# Patient Record
Sex: FEMALE | Race: OTHER | Employment: UNEMPLOYED | URBAN - METROPOLITAN AREA
[De-identification: names, ages, dates, MRNs, and addresses within clinical notes are randomized per-mention and may not be internally consistent; named-entity substitution may affect disease eponyms.]

---

## 2022-01-05 ENCOUNTER — APPOINTMENT (OUTPATIENT)
Dept: CT IMAGING | Age: 88
DRG: 683 | End: 2022-01-05
Payer: MEDICARE

## 2022-01-05 ENCOUNTER — APPOINTMENT (OUTPATIENT)
Dept: GENERAL RADIOLOGY | Age: 88
DRG: 683 | End: 2022-01-05
Payer: MEDICARE

## 2022-01-05 ENCOUNTER — HOSPITAL ENCOUNTER (INPATIENT)
Age: 88
LOS: 1 days | Discharge: HOME HEALTH | DRG: 683 | End: 2022-01-07
Attending: EMERGENCY MEDICINE | Admitting: INTERNAL MEDICINE
Payer: MEDICARE

## 2022-01-05 DIAGNOSIS — N39.0 URINARY TRACT INFECTION WITHOUT HEMATURIA, SITE UNSPECIFIED: ICD-10-CM

## 2022-01-05 DIAGNOSIS — S09.90XA INJURY OF HEAD, INITIAL ENCOUNTER: Primary | ICD-10-CM

## 2022-01-05 DIAGNOSIS — N17.9 AKI (ACUTE KIDNEY INJURY) (HCC): ICD-10-CM

## 2022-01-05 LAB
-: ABNORMAL
ABSOLUTE EOS #: 0.14 K/UL (ref 0–0.44)
ABSOLUTE IMMATURE GRANULOCYTE: <0.03 K/UL (ref 0–0.3)
ABSOLUTE LYMPH #: 1.22 K/UL (ref 1.1–3.7)
ABSOLUTE MONO #: 0.85 K/UL (ref 0.1–1.2)
ALBUMIN SERPL-MCNC: 3.6 G/DL (ref 3.5–5.2)
ALBUMIN/GLOBULIN RATIO: 1.5 (ref 1–2.5)
ALP BLD-CCNC: 88 U/L (ref 35–104)
ALT SERPL-CCNC: 14 U/L (ref 5–33)
AMORPHOUS: ABNORMAL
ANION GAP SERPL CALCULATED.3IONS-SCNC: 12 MMOL/L (ref 9–17)
AST SERPL-CCNC: 13 U/L
BACTERIA: ABNORMAL
BASOPHILS # BLD: 1 % (ref 0–2)
BASOPHILS ABSOLUTE: 0.05 K/UL (ref 0–0.2)
BILIRUB SERPL-MCNC: 0.55 MG/DL (ref 0.3–1.2)
BILIRUBIN URINE: NEGATIVE
BUN BLDV-MCNC: 29 MG/DL (ref 8–23)
BUN/CREAT BLD: ABNORMAL (ref 9–20)
CALCIUM SERPL-MCNC: 9.3 MG/DL (ref 8.6–10.4)
CASTS UA: ABNORMAL /LPF (ref 0–8)
CHLORIDE BLD-SCNC: 101 MMOL/L (ref 98–107)
CO2: 25 MMOL/L (ref 20–31)
COLOR: YELLOW
COMMENT UA: ABNORMAL
CREAT SERPL-MCNC: 1.36 MG/DL (ref 0.5–0.9)
CRYSTALS, UA: ABNORMAL /HPF
DIFFERENTIAL TYPE: ABNORMAL
EOSINOPHILS RELATIVE PERCENT: 2 % (ref 1–4)
EPITHELIAL CELLS UA: ABNORMAL /HPF (ref 0–5)
GFR AFRICAN AMERICAN: 44 ML/MIN
GFR NON-AFRICAN AMERICAN: 37 ML/MIN
GFR SERPL CREATININE-BSD FRML MDRD: ABNORMAL ML/MIN/{1.73_M2}
GFR SERPL CREATININE-BSD FRML MDRD: ABNORMAL ML/MIN/{1.73_M2}
GLUCOSE BLD-MCNC: 116 MG/DL (ref 70–99)
GLUCOSE URINE: NEGATIVE
HCT VFR BLD CALC: 37.9 % (ref 36.3–47.1)
HEMOGLOBIN: 12.5 G/DL (ref 11.9–15.1)
IMMATURE GRANULOCYTES: 0 %
KETONES, URINE: NEGATIVE
LEUKOCYTE ESTERASE, URINE: ABNORMAL
LYMPHOCYTES # BLD: 14 % (ref 24–43)
MCH RBC QN AUTO: 30.3 PG (ref 25.2–33.5)
MCHC RBC AUTO-ENTMCNC: 33 G/DL (ref 28.4–34.8)
MCV RBC AUTO: 92 FL (ref 82.6–102.9)
MONOCYTES # BLD: 10 % (ref 3–12)
MUCUS: ABNORMAL
NITRITE, URINE: POSITIVE
NRBC AUTOMATED: 0 PER 100 WBC
OTHER OBSERVATIONS UA: ABNORMAL
PDW BLD-RTO: 12.6 % (ref 11.8–14.4)
PH UA: 5.5 (ref 5–8)
PLATELET # BLD: 183 K/UL (ref 138–453)
PLATELET ESTIMATE: ABNORMAL
PMV BLD AUTO: 8.9 FL (ref 8.1–13.5)
POTASSIUM SERPL-SCNC: 3.9 MMOL/L (ref 3.7–5.3)
PROTEIN UA: NEGATIVE
RBC # BLD: 4.12 M/UL (ref 3.95–5.11)
RBC # BLD: ABNORMAL 10*6/UL
RBC UA: ABNORMAL /HPF (ref 0–4)
RENAL EPITHELIAL, UA: ABNORMAL /HPF
SEG NEUTROPHILS: 73 % (ref 36–65)
SEGMENTED NEUTROPHILS ABSOLUTE COUNT: 6.54 K/UL (ref 1.5–8.1)
SODIUM BLD-SCNC: 138 MMOL/L (ref 135–144)
SPECIFIC GRAVITY UA: 1.01 (ref 1–1.03)
TOTAL PROTEIN: 6 G/DL (ref 6.4–8.3)
TRICHOMONAS: ABNORMAL
TROPONIN INTERP: NORMAL
TROPONIN T: NORMAL NG/ML
TROPONIN, HIGH SENSITIVITY: 14 NG/L (ref 0–14)
TURBIDITY: CLEAR
URINE HGB: NEGATIVE
UROBILINOGEN, URINE: NORMAL
WBC # BLD: 8.8 K/UL (ref 3.5–11.3)
WBC # BLD: ABNORMAL 10*3/UL
WBC UA: ABNORMAL /HPF (ref 0–5)
YEAST: ABNORMAL

## 2022-01-05 PROCEDURE — 70450 CT HEAD/BRAIN W/O DYE: CPT

## 2022-01-05 PROCEDURE — 73502 X-RAY EXAM HIP UNI 2-3 VIEWS: CPT

## 2022-01-05 PROCEDURE — 99284 EMERGENCY DEPT VISIT MOD MDM: CPT

## 2022-01-05 PROCEDURE — 84484 ASSAY OF TROPONIN QUANT: CPT

## 2022-01-05 PROCEDURE — 93005 ELECTROCARDIOGRAM TRACING: CPT | Performed by: STUDENT IN AN ORGANIZED HEALTH CARE EDUCATION/TRAINING PROGRAM

## 2022-01-05 PROCEDURE — 73700 CT LOWER EXTREMITY W/O DYE: CPT

## 2022-01-05 PROCEDURE — 81001 URINALYSIS AUTO W/SCOPE: CPT

## 2022-01-05 PROCEDURE — 71045 X-RAY EXAM CHEST 1 VIEW: CPT

## 2022-01-05 PROCEDURE — 72125 CT NECK SPINE W/O DYE: CPT

## 2022-01-05 PROCEDURE — 85025 COMPLETE CBC W/AUTO DIFF WBC: CPT

## 2022-01-05 PROCEDURE — 2580000003 HC RX 258: Performed by: STUDENT IN AN ORGANIZED HEALTH CARE EDUCATION/TRAINING PROGRAM

## 2022-01-05 PROCEDURE — 87086 URINE CULTURE/COLONY COUNT: CPT

## 2022-01-05 PROCEDURE — 2580000003 HC RX 258: Performed by: EMERGENCY MEDICINE

## 2022-01-05 PROCEDURE — 80053 COMPREHEN METABOLIC PANEL: CPT

## 2022-01-05 PROCEDURE — 87186 SC STD MICRODIL/AGAR DIL: CPT

## 2022-01-05 PROCEDURE — 87077 CULTURE AEROBIC IDENTIFY: CPT

## 2022-01-05 RX ORDER — SODIUM CHLORIDE, SODIUM LACTATE, POTASSIUM CHLORIDE, AND CALCIUM CHLORIDE .6; .31; .03; .02 G/100ML; G/100ML; G/100ML; G/100ML
1000 INJECTION, SOLUTION INTRAVENOUS ONCE
Status: COMPLETED | OUTPATIENT
Start: 2022-01-05 | End: 2022-01-06

## 2022-01-05 RX ORDER — 0.9 % SODIUM CHLORIDE 0.9 %
500 INTRAVENOUS SOLUTION INTRAVENOUS ONCE
Status: COMPLETED | OUTPATIENT
Start: 2022-01-05 | End: 2022-01-06

## 2022-01-05 RX ADMIN — SODIUM CHLORIDE 500 ML: 9 INJECTION, SOLUTION INTRAVENOUS at 22:21

## 2022-01-05 RX ADMIN — SODIUM CHLORIDE, POTASSIUM CHLORIDE, SODIUM LACTATE AND CALCIUM CHLORIDE 1000 ML: 600; 310; 30; 20 INJECTION, SOLUTION INTRAVENOUS at 23:30

## 2022-01-05 ASSESSMENT — ENCOUNTER SYMPTOMS
VOMITING: 0
DIARRHEA: 0
NAUSEA: 0
BACK PAIN: 0
ABDOMINAL PAIN: 0
RHINORRHEA: 0
SHORTNESS OF BREATH: 0
COUGH: 0
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
## 2022-01-06 PROBLEM — N39.0 UTI (URINARY TRACT INFECTION): Status: ACTIVE | Noted: 2022-01-06

## 2022-01-06 PROBLEM — S01.91XA LACERATION OF HEAD: Status: ACTIVE | Noted: 2022-01-06

## 2022-01-06 PROBLEM — F03.90 DEMENTIA (HCC): Status: ACTIVE | Noted: 2022-01-06

## 2022-01-06 PROBLEM — I11.0 HYPERTENSIVE HEART DISEASE WITH CHRONIC COMBINED SYSTOLIC AND DIASTOLIC CONGESTIVE HEART FAILURE (HCC): Status: ACTIVE | Noted: 2022-01-06

## 2022-01-06 PROBLEM — N17.9 AKI (ACUTE KIDNEY INJURY) (HCC): Status: ACTIVE | Noted: 2022-01-06

## 2022-01-06 PROBLEM — I50.42 HYPERTENSIVE HEART DISEASE WITH CHRONIC COMBINED SYSTOLIC AND DIASTOLIC CONGESTIVE HEART FAILURE (HCC): Status: ACTIVE | Noted: 2022-01-06

## 2022-01-06 LAB
LITHIUM DATE LAST DOSE: ABNORMAL
LITHIUM DOSE AMOUNT: ABNORMAL
LITHIUM DOSE TIME: ABNORMAL
LITHIUM LEVEL: <0.1 MMOL/L (ref 0.6–1.2)
LV EF: 65 %
LVEF MODALITY: NORMAL

## 2022-01-06 PROCEDURE — 6360000002 HC RX W HCPCS

## 2022-01-06 PROCEDURE — 2580000003 HC RX 258

## 2022-01-06 PROCEDURE — 93356 MYOCRD STRAIN IMG SPCKL TRCK: CPT

## 2022-01-06 PROCEDURE — 99233 SBSQ HOSP IP/OBS HIGH 50: CPT | Performed by: INTERNAL MEDICINE

## 2022-01-06 PROCEDURE — 6370000000 HC RX 637 (ALT 250 FOR IP)

## 2022-01-06 PROCEDURE — 97162 PT EVAL MOD COMPLEX 30 MIN: CPT

## 2022-01-06 PROCEDURE — 97166 OT EVAL MOD COMPLEX 45 MIN: CPT

## 2022-01-06 PROCEDURE — 97535 SELF CARE MNGMENT TRAINING: CPT

## 2022-01-06 PROCEDURE — 93005 ELECTROCARDIOGRAM TRACING: CPT | Performed by: INTERNAL MEDICINE

## 2022-01-06 PROCEDURE — 80178 ASSAY OF LITHIUM: CPT

## 2022-01-06 PROCEDURE — 36415 COLL VENOUS BLD VENIPUNCTURE: CPT

## 2022-01-06 PROCEDURE — 93306 TTE W/DOPPLER COMPLETE: CPT

## 2022-01-06 PROCEDURE — 1200000000 HC SEMI PRIVATE

## 2022-01-06 PROCEDURE — 97116 GAIT TRAINING THERAPY: CPT

## 2022-01-06 PROCEDURE — 6370000000 HC RX 637 (ALT 250 FOR IP): Performed by: INTERNAL MEDICINE

## 2022-01-06 RX ORDER — ACETAMINOPHEN 650 MG/1
650 SUPPOSITORY RECTAL EVERY 6 HOURS PRN
Status: DISCONTINUED | OUTPATIENT
Start: 2022-01-06 | End: 2022-01-07 | Stop reason: HOSPADM

## 2022-01-06 RX ORDER — ONDANSETRON 2 MG/ML
4 INJECTION INTRAMUSCULAR; INTRAVENOUS EVERY 6 HOURS PRN
Status: DISCONTINUED | OUTPATIENT
Start: 2022-01-06 | End: 2022-01-07 | Stop reason: HOSPADM

## 2022-01-06 RX ORDER — NITROGLYCERIN 0.4 MG/1
0.4 TABLET SUBLINGUAL EVERY 5 MIN PRN
Status: DISCONTINUED | OUTPATIENT
Start: 2022-01-06 | End: 2022-01-07 | Stop reason: HOSPADM

## 2022-01-06 RX ORDER — OLANZAPINE AND FLUOXETINE 6; 25 MG/1; MG/1
1 CAPSULE ORAL NIGHTLY
Status: DISCONTINUED | OUTPATIENT
Start: 2022-01-06 | End: 2022-01-07 | Stop reason: HOSPADM

## 2022-01-06 RX ORDER — DONEPEZIL HYDROCHLORIDE 10 MG/1
10 TABLET, FILM COATED ORAL NIGHTLY
Status: DISCONTINUED | OUTPATIENT
Start: 2022-01-06 | End: 2022-01-07 | Stop reason: HOSPADM

## 2022-01-06 RX ORDER — POTASSIUM CHLORIDE 20 MEQ/1
40 TABLET, EXTENDED RELEASE ORAL PRN
Status: DISCONTINUED | OUTPATIENT
Start: 2022-01-06 | End: 2022-01-07 | Stop reason: HOSPADM

## 2022-01-06 RX ORDER — POLYETHYLENE GLYCOL 3350 17 G/17G
17 POWDER, FOR SOLUTION ORAL DAILY PRN
Status: DISCONTINUED | OUTPATIENT
Start: 2022-01-06 | End: 2022-01-07 | Stop reason: HOSPADM

## 2022-01-06 RX ORDER — SODIUM CHLORIDE 9 MG/ML
INJECTION, SOLUTION INTRAVENOUS CONTINUOUS
Status: DISCONTINUED | OUTPATIENT
Start: 2022-01-06 | End: 2022-01-07 | Stop reason: HOSPADM

## 2022-01-06 RX ORDER — LEVOTHYROXINE SODIUM 0.05 MG/1
50 TABLET ORAL DAILY
Status: DISCONTINUED | OUTPATIENT
Start: 2022-01-06 | End: 2022-01-07 | Stop reason: HOSPADM

## 2022-01-06 RX ORDER — SODIUM CHLORIDE 9 MG/ML
25 INJECTION, SOLUTION INTRAVENOUS PRN
Status: DISCONTINUED | OUTPATIENT
Start: 2022-01-06 | End: 2022-01-07 | Stop reason: HOSPADM

## 2022-01-06 RX ORDER — PREGABALIN 25 MG/1
25 CAPSULE ORAL 3 TIMES DAILY
Status: DISCONTINUED | OUTPATIENT
Start: 2022-01-06 | End: 2022-01-07 | Stop reason: HOSPADM

## 2022-01-06 RX ORDER — ACETAMINOPHEN 325 MG/1
650 TABLET ORAL EVERY 6 HOURS PRN
Status: DISCONTINUED | OUTPATIENT
Start: 2022-01-06 | End: 2022-01-07 | Stop reason: HOSPADM

## 2022-01-06 RX ORDER — FLUOXETINE HYDROCHLORIDE 20 MG/1
20 CAPSULE ORAL DAILY
Status: DISCONTINUED | OUTPATIENT
Start: 2022-01-06 | End: 2022-01-07 | Stop reason: HOSPADM

## 2022-01-06 RX ORDER — POTASSIUM CHLORIDE 7.45 MG/ML
10 INJECTION INTRAVENOUS PRN
Status: DISCONTINUED | OUTPATIENT
Start: 2022-01-06 | End: 2022-01-07 | Stop reason: HOSPADM

## 2022-01-06 RX ORDER — SODIUM CHLORIDE 0.9 % (FLUSH) 0.9 %
5-40 SYRINGE (ML) INJECTION EVERY 12 HOURS SCHEDULED
Status: DISCONTINUED | OUTPATIENT
Start: 2022-01-06 | End: 2022-01-07 | Stop reason: HOSPADM

## 2022-01-06 RX ORDER — ONDANSETRON 4 MG/1
4 TABLET, ORALLY DISINTEGRATING ORAL EVERY 8 HOURS PRN
Status: DISCONTINUED | OUTPATIENT
Start: 2022-01-06 | End: 2022-01-07 | Stop reason: HOSPADM

## 2022-01-06 RX ORDER — HEPARIN SODIUM 5000 [USP'U]/ML
5000 INJECTION, SOLUTION INTRAVENOUS; SUBCUTANEOUS EVERY 8 HOURS SCHEDULED
Status: DISCONTINUED | OUTPATIENT
Start: 2022-01-06 | End: 2022-01-07 | Stop reason: HOSPADM

## 2022-01-06 RX ORDER — GABAPENTIN 100 MG/1
100 CAPSULE ORAL 2 TIMES DAILY
Status: DISCONTINUED | OUTPATIENT
Start: 2022-01-06 | End: 2022-01-07 | Stop reason: HOSPADM

## 2022-01-06 RX ORDER — SODIUM CHLORIDE 0.9 % (FLUSH) 0.9 %
5-40 SYRINGE (ML) INJECTION PRN
Status: DISCONTINUED | OUTPATIENT
Start: 2022-01-06 | End: 2022-01-07 | Stop reason: HOSPADM

## 2022-01-06 RX ORDER — FAMOTIDINE 20 MG/1
20 TABLET, FILM COATED ORAL 2 TIMES DAILY
Status: DISCONTINUED | OUTPATIENT
Start: 2022-01-06 | End: 2022-01-07 | Stop reason: HOSPADM

## 2022-01-06 RX ORDER — MAGNESIUM SULFATE IN WATER 40 MG/ML
2000 INJECTION, SOLUTION INTRAVENOUS PRN
Status: DISCONTINUED | OUTPATIENT
Start: 2022-01-06 | End: 2022-01-07 | Stop reason: HOSPADM

## 2022-01-06 RX ADMIN — PREGABALIN 25 MG: 25 CAPSULE ORAL at 22:27

## 2022-01-06 RX ADMIN — HEPARIN SODIUM 5000 UNITS: 5000 INJECTION INTRAVENOUS; SUBCUTANEOUS at 07:34

## 2022-01-06 RX ADMIN — DONEPEZIL HYDROCHLORIDE 10 MG: 10 TABLET, FILM COATED ORAL at 22:24

## 2022-01-06 RX ADMIN — FLUOXETINE HYDROCHLORIDE 20 MG: 20 CAPSULE ORAL at 08:21

## 2022-01-06 RX ADMIN — GABAPENTIN 100 MG: 100 CAPSULE ORAL at 08:21

## 2022-01-06 RX ADMIN — PREGABALIN 25 MG: 25 CAPSULE ORAL at 08:22

## 2022-01-06 RX ADMIN — FAMOTIDINE 20 MG: 20 TABLET, FILM COATED ORAL at 22:25

## 2022-01-06 RX ADMIN — HEPARIN SODIUM 5000 UNITS: 5000 INJECTION INTRAVENOUS; SUBCUTANEOUS at 16:05

## 2022-01-06 RX ADMIN — GABAPENTIN 100 MG: 100 CAPSULE ORAL at 22:25

## 2022-01-06 RX ADMIN — FAMOTIDINE 20 MG: 20 TABLET, FILM COATED ORAL at 13:26

## 2022-01-06 RX ADMIN — CEFTRIAXONE SODIUM 1000 MG: 1 INJECTION, POWDER, FOR SOLUTION INTRAMUSCULAR; INTRAVENOUS at 02:42

## 2022-01-06 RX ADMIN — SODIUM CHLORIDE: 9 INJECTION, SOLUTION INTRAVENOUS at 02:53

## 2022-01-06 RX ADMIN — LEVOTHYROXINE SODIUM 50 MCG: 50 TABLET ORAL at 08:21

## 2022-01-06 ASSESSMENT — PAIN DESCRIPTION - DESCRIPTORS: DESCRIPTORS: ACHING

## 2022-01-06 ASSESSMENT — PAIN SCALES - GENERAL
PAINLEVEL_OUTOF10: 0
PAINLEVEL_OUTOF10: 5
PAINLEVEL_OUTOF10: 0
PAINLEVEL_OUTOF10: 5

## 2022-01-06 ASSESSMENT — PAIN DESCRIPTION - PAIN TYPE: TYPE: ACUTE PAIN

## 2022-01-06 ASSESSMENT — PAIN DESCRIPTION - FREQUENCY: FREQUENCY: CONTINUOUS

## 2022-01-06 ASSESSMENT — PAIN DESCRIPTION - LOCATION: LOCATION: ABDOMEN

## 2022-01-06 NOTE — H&P
[address]     Department of Internal Medicine - Staff Internal Medicine Teaching Service          ADMISSION NOTE/HISTORY AND PHYSICAL EXAMINATION   Date: 1/5/2022  Patient Name: Jules Jones  Date of admission: 1/5/2022  8:12 PM  YOB: 1930  PCP: Marshall Edmonds MD  History Obtained From: Son, electronic medical record    CHIEF COMPLAINT     Chief complaint: Fall    HISTORY OF PRESENTING ILLNESS     The patient is a pleasant 81 y.o. female with PMH of dementia, depression, anxiety, hypothyroidism presents with a chief complaint of a nonmechanical fall 3 days ago. History obtained from son. Patient does not understand [place]. She fell back, hit her head and had laceration of occipital scalp. Not evaluated at that time, unclear about loss of consciousness, no seizures like activity. Report intermittent headaches and pelvic pain. Family is concerned about facial droop, altered mental status, decreased responsiveness and unsteady gait. Dr. David Hendrix is /. denies any nausea, vomiting abdominal pain, chest, shortness of breath, urgency, diarrhea. On arrival to ED patient was AAO x4, GCS 15. EOMI. CN II to XII intact. No focal motor or sensory neurologic deficit. Examination of the scalp revealed healing laceration of the right occipital region. No crepitus or discharge noted. Tenderness to palpation over the hypogastric area. No distention on abdomin noted. On my evaluation patient is hemodynamically stable, no focal neurologic deficit. Unable to understand [place].  not present at bedside. Examination of the scalp shows about 2 cm linear healing laceration. No active chest sounds. Abdomen is soft, nontender. Bilateral pedal edema with tenderness noted. Pertinent labs : sodium 138, potassium 3.9, BUN 29, creatinine 1.36, , troponin XIV, WBC 8.8, Hb 12.5, platelet 872.   UA shows positive for nitrate and trace leukocyte esterase, WBC 10-20 with many bacteria. Urine culture sent. CXR mild pulmonary vascular congestion, no overt pulmonary edema. Review of Systems   Unable to obtain due to communication issues. PAST MEDICAL HISTORY     Past Medical History:   Diagnosis Date    Anxiety     Depression     Hypertensive emergency 11/3/2015    Thyroid disease        PAST SURGICAL HISTORY     No past surgical history on file. ALLERGIES     Valium [diazepam]    MEDICATIONS PRIOR TO ADMISSION     Prior to Admission medications    Medication Sig Start Date End Date Taking?  Authorizing Provider   fesoterodine 4 MG TB24 Take 1 tablet by mouth daily    Historical Provider, MD   Brexpiprazole 0.5 MG TABS Take 1 tablet by mouth daily    Historical Provider, MD   ranitidine (ZANTAC) 150 MG tablet Take 150 mg by mouth 2 times daily    Historical Provider, MD   fexofenadine (ALLEGRA) 180 MG tablet Take 180 mg by mouth daily    Historical Provider, MD   pregabalin (LYRICA) 25 MG capsule May increase weekly by 1 pill until comfortable or using 2 pills tid 1/21/16 3/21/16  Malick Calvo MD   donepezil (ARICEPT) 10 MG tablet Take 10 mg by mouth nightly    Historical Provider, MD   gabapentin (NEURONTIN) 100 MG capsule Take 100 mg by mouth 2 times daily    Historical Provider, MD   lithium (LITHOBID) 300 MG CR tablet 1/4 of a tablet daily    Historical Provider, MD   Cholecalciferol (VITAMIN D3) 1000 UNITS CAPS Take 2 capsules by mouth daily    Historical Provider, MD   traMADol (ULTRAM) 50 MG tablet Take 50 mg by mouth 3 times daily as needed for Pain    Historical Provider, MD   nitroGLYCERIN (NITROSTAT) 0.3 MG SL tablet Place 1 tablet under the tongue every 5 minutes as needed for Chest pain 11/4/15   Juan J Klein MD   FLUoxetine (PROZAC) 20 MG capsule Take 20 mg by mouth daily    Historical Provider, MD   levothyroxine (SYNTHROID) 50 MCG tablet Take 50 mcg by mouth Daily    Historical Provider, MD   ALPRAZolam Danney Checo) 0.5 MG tablet Take 0.5 mg by mouth nightly as needed for Sleep (0.5 to 1 mg at night for sleep prn)     Historical Provider, MD   ALPRAZolam (XANAX) 0.5 MG tablet Take 0.5 mg by mouth 2 times daily Take once in the morning and again in the afternoon    Historical Provider, MD   OLANZapine-FLUoxetine (SYMBYAX) 6-25 MG CAPS capsule Take 1 capsule by mouth nightly    Historical Provider, MD       SOCIAL HISTORY     Tobacco: former smoker, denies current smoking  Alcohol: denies alcohol abuse  Illicits: denies  Occupation:     FAMILY HISTORY     No family history on file. PHYSICAL EXAM     Vitals: /62   Pulse 74   Temp 97.5 °F (36.4 °C) (Oral)   Resp 20   SpO2 98%   Tmax: Temp (24hrs), Av.5 °F (36.4 °C), Min:97.5 °F (36.4 °C), Max:97.5 °F (36.4 °C)    Last Body weight:   Wt Readings from Last 3 Encounters:   16 147 lb 11.2 oz (67 kg)     Body Mass Index : There is no height or weight on file to calculate BMI. Physical Exam  Constitutional:       General: She is not in acute distress. Appearance: She is not ill-appearing, toxic-appearing or diaphoretic. Cardiovascular:      Heart sounds: No murmur heard. No friction rub. No gallop. Pulmonary:      Effort: No respiratory distress. Breath sounds: No stridor. No wheezing, rhonchi or rales. Chest:      Chest wall: No tenderness. Abdominal:      General: There is no distension. Palpations: There is no mass. Tenderness: There is no abdominal tenderness. There is no guarding or rebound. Hernia: No hernia is present. Musculoskeletal:      Right lower leg: Edema present. Left lower leg: Edema present. Skin:     Coloration: Skin is not jaundiced or pale. Findings: No bruising or erythema. Neurological:      Cranial Nerves: No cranial nerve deficit. Sensory: No sensory deficit. Motor: No weakness.       Coordination: Coordination normal.      Gait: Gait normal.       INVESTIGATIONS Laboratory Testing:     Recent Results (from the past 24 hour(s))   CBC Auto Differential    Collection Time: 01/05/22  9:15 PM   Result Value Ref Range    WBC 8.8 3.5 - 11.3 k/uL    RBC 4.12 3.95 - 5.11 m/uL    Hemoglobin 12.5 11.9 - 15.1 g/dL    Hematocrit 37.9 36.3 - 47.1 %    MCV 92.0 82.6 - 102.9 fL    MCH 30.3 25.2 - 33.5 pg    MCHC 33.0 28.4 - 34.8 g/dL    RDW 12.6 11.8 - 14.4 %    Platelets 137 949 - 085 k/uL    MPV 8.9 8.1 - 13.5 fL    NRBC Automated 0.0 0.0 per 100 WBC    Differential Type NOT REPORTED     Seg Neutrophils 73 (H) 36 - 65 %    Lymphocytes 14 (L) 24 - 43 %    Monocytes 10 3 - 12 %    Eosinophils % 2 1 - 4 %    Basophils 1 0 - 2 %    Immature Granulocytes 0 0 %    Segs Absolute 6.54 1.50 - 8.10 k/uL    Absolute Lymph # 1.22 1.10 - 3.70 k/uL    Absolute Mono # 0.85 0.10 - 1.20 k/uL    Absolute Eos # 0.14 0.00 - 0.44 k/uL    Basophils Absolute 0.05 0.00 - 0.20 k/uL    Absolute Immature Granulocyte <0.03 0.00 - 0.30 k/uL    WBC Morphology NOT REPORTED     RBC Morphology NOT REPORTED     Platelet Estimate NOT REPORTED    Comprehensive Metabolic Panel w/ Reflex to MG    Collection Time: 01/05/22  9:15 PM   Result Value Ref Range    Glucose 116 (H) 70 - 99 mg/dL    BUN 29 (H) 8 - 23 mg/dL    CREATININE 1.36 (H) 0.50 - 0.90 mg/dL    Bun/Cre Ratio NOT REPORTED 9 - 20    Calcium 9.3 8.6 - 10.4 mg/dL    Sodium 138 135 - 144 mmol/L    Potassium 3.9 3.7 - 5.3 mmol/L    Chloride 101 98 - 107 mmol/L    CO2 25 20 - 31 mmol/L    Anion Gap 12 9 - 17 mmol/L    Alkaline Phosphatase 88 35 - 104 U/L    ALT 14 5 - 33 U/L    AST 13 <32 U/L    Total Bilirubin 0.55 0.3 - 1.2 mg/dL    Total Protein 6.0 (L) 6.4 - 8.3 g/dL    Albumin 3.6 3.5 - 5.2 g/dL    Albumin/Globulin Ratio 1.5 1.0 - 2.5    GFR Non- 37 (L) >60 mL/min    GFR  44 (L) >60 mL/min    GFR Comment          GFR Staging NOT REPORTED    Troponin    Collection Time: 01/05/22  9:15 PM   Result Value Ref Range    Troponin, High Sensitivity 14 0 - 14 ng/L    Troponin T NOT REPORTED <0.03 ng/mL    Troponin Interp NOT REPORTED    Urinalysis Reflex to Culture    Collection Time: 01/05/22 11:03 PM    Specimen: Urine, clean catch   Result Value Ref Range    Color, UA Yellow Yellow    Turbidity UA Clear Clear    Glucose, Ur NEGATIVE NEGATIVE    Bilirubin Urine NEGATIVE NEGATIVE    Ketones, Urine NEGATIVE NEGATIVE    Specific Gravity, UA 1.014 1.005 - 1.030    Urine Hgb NEGATIVE NEGATIVE    pH, UA 5.5 5.0 - 8.0    Protein, UA NEGATIVE NEGATIVE    Urobilinogen, Urine Normal Normal    Nitrite, Urine POSITIVE (A) NEGATIVE    Leukocyte Esterase, Urine TRACE (A) NEGATIVE    Urinalysis Comments NOT REPORTED    Microscopic Urinalysis    Collection Time: 01/05/22 11:03 PM   Result Value Ref Range    -          WBC, UA 10 TO 20 0 - 5 /HPF    RBC, UA None 0 - 4 /HPF    Casts UA  0 - 8 /LPF     2 TO 5 HYALINE Reference range defined for non-centrifuged specimen. Crystals, UA NOT REPORTED None /HPF    Epithelial Cells UA 2 TO 5 0 - 5 /HPF    Renal Epithelial, UA NOT REPORTED 0 /HPF    Bacteria, UA MANY (A) None    Mucus, UA NOT REPORTED None    Trichomonas, UA NOT REPORTED None    Amorphous, UA NOT REPORTED None    Other Observations UA NOT REPORTED NOT REQ. Yeast, UA NOT REPORTED None       Imaging:   CT Head WO Contrast    Result Date: 1/5/2022  No acute intracranial abnormality. Chronic microvascular disease. RECOMMENDATIONS: Unavailable     CT Cervical Spine WO Contrast    Result Date: 1/5/2022  Multilevel degenerate change. No acute fracture traumatic malalignment. RECOMMENDATIONS: Unavailable     XR CHEST PORTABLE    Result Date: 1/5/2022  Mild pulmonary vascular congestion without overt pulmonary edema. Minimal bibasilar patchy/hazy airspace opacities felt most likely on the basis of atelectasis although pneumonia or aspiration pneumonitis could have a similar appearance in the appropriate clinical setting.  Increased rightward bowing of the trachea could relate to aortic pathology or lymphadenopathy/mass. Suggest further evaluation with CT chest with IV contrast versus CTA chest depending on clinical concern. Stable cardiomegaly. Question if there is a hiatal hernia present. CT HIP RIGHT WO CONTRAST    Result Date: 1/5/2022  No acute fractures are identified. The appearance of the right hip on the radiograph is secondary to a ring osteophyte. A subcapital femoral neck fracture is not present. Overall, no acute abnormality identified. Small bubble of gas within urinary bladder, which is likely secondary to instrumentation. A gas-forming infection does remain in the differential, and therefore correlate with any clinical evidence of urinary tract infection. Extensive diverticulosis of the visualized large bowel without CT evidence of diverticulitis. XR HIP 2-3 VW W PELVIS RIGHT    Result Date: 1/5/2022  No fracture or dislocation. ASSESSMENT & PLAN     ASSESSMENT / PLAN:     IMPRESSION  This is a 81 y.o. female who presented with with nonmechanical fall. Trauma work-up negative. Found to have UTI and EDUARDO. 1. Non mechanical fall causing laceration of occipital scalp. EKG shows NSR. History of dementia. Trauma work-up negative. 2. Urinary tract infection. Started on Rocephin. S/p RL 1 L, . Follow on culture and sensitivity testing. 3. Acute kidney injury likely due to dehydration. S/p RL 1 L, . Continue on maintenance fluids, NS 50/hr. follow BUN/creatinine  4. Hypothyroidism. Continue home dose of Synthroid 50 daily. 5. Anxiety/depression. Continue home dose of Prozac 20. Symbyax 6-25    DVT ppx: On heparin 5000 q8    PT/OT/SW: Following  Discharge Planning: CM to assist with    Apple Mendoza MD  Internal Medicine Resident, PGY-1  [address] [place], [place]  1/5/2022, 11:45 PM

## 2022-01-06 NOTE — ED TRIAGE NOTES
Pt presents to ED with family and caregivers who report she has been acting different since a fall on 1/1/22    NAD, resp even and non labored. speech clear and appropriate. Pt hx of dementia. pt ambulatory with slow unsteady gait. side rails up x 2 call light within reach. Laceration to right occipital area of back of head.   Dr Ayala Done at bedside to assess

## 2022-01-06 NOTE — CARE COORDINATION
SBIRT deferred due to pt's mental status and language barrier.           Alcohol Screening and Brief Intervention          Deferred [x]    Completed on: 1/6/2022   PREETI Matos

## 2022-01-06 NOTE — ED PROVIDER NOTES
Faculty Sign-Out Attestation  Handoff taken on the following patient from prior Attending Physician: Elysia Beatty    I was available and discussed any additional care issues that arose and coordinated the management plans with the resident(s) caring for the patient during my duty period. Any areas of disagreement with residents documentation of care or procedures are noted on the chart. I was personally present for the key portions of any/all procedures during my duty period. I have documented in the chart those procedures where I was not present during the key portions.     S/p fall, inter med admit,     Aniceto Redd,   Attending Physician     Aniceto Redd,   01/06/22 0032    Admitted per Zo [id number], DO  01/06/22 0581

## 2022-01-06 NOTE — CARE COORDINATION
Case Management Initial Discharge Plan  Donell Ko,             Met with:patient and son Benito Samano questions alsto sister Fe Holbrook whom pt lives with  to discuss discharge plans. Son relates he spoke to Dr Joe Bhakta pt will be here at least another day - Pt has walker but would like dme for wheelchair . ALso wants [id number] for [address] verified: address, contacts, phone number, , insurance Yes  Insurance Provider: medicare A and B    Emergency Contact/Next of Kin name & number: Ponce Petersen- [phone]  Who are involved in patient's support system? Pts daughter and son     PCP: Sammi Reece MD  Date of last visit:       Discharge Planning    Living Arrangements:    lives with daughter Fe Holbrook - pt has walker      Home has 1stories   0 stairs to climb to get into front door,  0stairs to climb to reach second floor  Location of bedroom/bathroom in home main    Patient able to perform ADL's:Assisted    Current Services (outpatient & in home) no  DME equipment: has walker doesn't usually use  woant wheelchair   DME provider:     Is patient receiving oral anticoagulation therapy? No    If indicated:   Physician managing anticoagulation treatment:   Where does patient obtain lab work for ATC treatment? Potential Assistance Needed:       Patient agreeable to home care: Yes- wants [id number] [facility] -referral sent   [facility] of choice provided:  no    Prior SNF/Rehab Placement and Facility: no  Agreeable to SNF/Rehab: No  [facility] of choice provided: no     Evaluation: no    Expected Discharge date:   a day or two     Patient expects to be discharged to: If home: is the family and/or caregiver wiling & able to provide support at home? yes  Who will be providing this support?  Ashlee*    Follow Up Appointment: Best Day/ Time:      Transportation provider: family  Transportation arrangements needed for discharge: No    Readmission Risk              Risk of Unplanned Readmission:  17             Does patient have a readmission risk score greater than 14?: No  If yes, follow-up appointment must be made within 7 days of discharge.      Goals of Care:       Educated pt on transitional options, provided freedom of choice and are agreeable with plan      Discharge Plan: home in a day or two would like a wheelchair and delivered to house referal to home care [id number] [facility] has pcp and ride           Electronically signed by Gayathri Easley RN on 1/6/22 at 12:33 PM EST

## 2022-01-06 NOTE — PROGRESS NOTES
[facility]  Internal Medicine Teaching Residency Program  Inpatient Daily Progress Note  ______________________________________________________________________________    Patient: Rosanne Almazan  YOB: 1930   XC    Acct: [de-identified]     Room: [facility]0350  Admit date: 2022  Today's date: 22  Number of days in the hospital: 0    SUBJECTIVE   Admitting Diagnosis: UTI (urinary tract infection)  CC: None mechanical fall, scalp laceration  Pt examined at bedside. Chart & results reviewed. No acute issues overnight  Vitals are stable  GCS 15  Labs: Sodium 138, potassium 3.9,   BUN 29, creatinine 1.36, ,   WBC 8.8, Hb 12.5, platelets 897  UA many bacteria, WBC 10-20    ROS:  Constitutional:  negative for chills, fevers, sweats  Respiratory:  negative for cough, dyspnea on exertion, hemoptysis, shortness of breath, wheezing  Cardiovascular:  negative for chest pain, chest pressure/discomfort, lower extremity edema, palpitations  Gastrointestinal:  negative for abdominal pain, constipation, diarrhea, nausea, vomiting  Neurological:  negative for dizziness, headache  BRIEF HISTORY     The patient is a pleasant 81 y.o. female with PMH of dementia, depression, anxiety, hypothyroidism presents with a chief complaint of a nonmechanical fall 3 days ago. History obtained from son. Patient does not understand [place]. She fell back, hit her head and had laceration of occipital scalp. Not evaluated at that time, unclear about loss of consciousness, no seizures like activity. Report intermittent headaches and pelvic pain. Family is concerned about facial droop, altered mental status, decreased responsiveness and unsteady gait. Dr. Deepika Sauceda is /. denies any nausea, vomiting abdominal pain, chest, shortness of breath, urgency, diarrhea.     On arrival to ED patient was AAO x4, GCS 15. EOMI. CN II to XII intact.   No focal motor or sensory neurologic deficit. Examination of the scalp revealed healing laceration of the right occipital region. No crepitus or discharge noted. Tenderness to palpation over the hypogastric area. No distention on abdomin noted. OBJECTIVE     Vital Signs:  BP (!) 155/63   Pulse 64   Temp 97 °F (36.1 °C) (Oral)   Resp 18   SpO2 97%     Temp (24hrs), Av.3 °F (36.3 °C), Min:97 °F (36.1 °C), Max:97.5 °F (36.4 °C)    No intake/output data recorded. Physical Exam:  Constitutional:alert, oriented, cooperative and in no apparent distress. Head:normocephalic and atraumatic. EENT:  PERRLA. No conjunctival injections. Septum was midline, mucosa was without erythema, exudates or cobblestoning. No thrush was noted. Neck: Supple without thyromegaly. No elevated JVP. Trachea was midline. Respiratory: Chest was symmetrical without dullness to percussion. Breath sounds bilaterally were clear to auscultation. There were no wheezes, rhonchi or rales. There is no intercostal retraction or use of accessory muscles. No egophony noted. Cardiovascular: Regular without murmur, clicks, gallops or rubs. Abdomen: Slightly rounded and soft without organomegaly. No rebound, rigidity or guarding was appreciated. .  Musculoskeletal: Normal curvature of the spine. No gross muscle weakness. Extremities: Bilateral lower extremity edema, no ulcerations, tenderness, varicosities or erythema. Muscle size, tone and strength are normal.  No involuntary movements are noted. Skin:  Warm and dry. Good color, turgor and pigmentation. No lesions or scars.   No cyanosis or clubbing  Neurological/Psychiatric: The patient's general behavior, level of consciousness, thought content and emotional status is normal.        Medications:  Scheduled Medications:    donepezil  10 mg Oral Nightly    pregabalin  25 mg Oral TID    OLANZapine-FLUoxetine  1 capsule Oral Nightly    levothyroxine  50 mcg Oral Daily    gabapentin  100 mg Oral BID    FLUoxetine  20 mg Oral Daily    sodium chloride flush  5-40 mL IntraVENous 2 times per day    heparin (porcine)  5,000 Units SubCUTAneous 3 times per day    cefTRIAXone (ROCEPHIN) IV  1,000 mg IntraVENous Q24H    famotidine  20 mg Oral BID     Continuous Infusions:    sodium chloride      sodium chloride 50 mL/hr at 01/06/22 0253     PRN MedicationsnitroGLYCERIN, 0.4 mg, Q5 Min PRN  sodium chloride flush, 5-40 mL, PRN  sodium chloride, 25 mL, PRN  ondansetron, 4 mg, Q8H PRN   Or  ondansetron, 4 mg, Q6H PRN  polyethylene glycol, 17 g, Daily PRN  acetaminophen, 650 mg, Q6H PRN   Or  acetaminophen, 650 mg, Q6H PRN  potassium chloride, 40 mEq, PRN   Or  potassium alternative oral replacement, 40 mEq, PRN   Or  potassium chloride, 10 mEq, PRN  magnesium sulfate, 2,000 mg, PRN        Diagnostic Labs:  CBC:   Recent Labs     01/05/22 2115   WBC 8.8   RBC 4.12   HGB 12.5   HCT 37.9   MCV 92.0   RDW 12.6        BMP:   Recent Labs     01/05/22 2115      K 3.9      CO2 25   BUN 29*   CREATININE 1.36*     BNP: No results for input(s): BNP in the last 72 hours. PT/INR: No results for input(s): PROTIME, INR in the last 72 hours. APTT: No results for input(s): APTT in the last 72 hours. CARDIAC ENZYMES: No results for input(s): CKMB, CKMBINDEX, TROPONINI in the last 72 hours. Invalid input(s): CKTOTAL;3  FASTING LIPID PANEL:  Lab Results   Component Value Date    CHOL 152 11/04/2015    HDL 57 11/04/2015    TRIG 69 11/04/2015     LIVER PROFILE:   Recent Labs     01/05/22 2115   AST 13   ALT 14   BILITOT 0.55   ALKPHOS 88      MICROBIOLOGY: No results found for: CULTURE    Imaging:    CT Head WO Contrast    Result Date: 1/5/2022  No acute intracranial abnormality. Chronic microvascular disease. RECOMMENDATIONS: Unavailable     CT Cervical Spine WO Contrast    Result Date: 1/5/2022  Multilevel degenerate change. No acute fracture traumatic malalignment. RECOMMENDATIONS: Unavailable     XR CHEST PORTABLE    Result Date: 1/5/2022  Mild pulmonary vascular congestion without overt pulmonary edema. Minimal bibasilar patchy/hazy airspace opacities felt most likely on the basis of atelectasis although pneumonia or aspiration pneumonitis could have a similar appearance in the appropriate clinical setting. Increased rightward bowing of the trachea could relate to aortic pathology or lymphadenopathy/mass. Suggest further evaluation with CT chest with IV contrast versus CTA chest depending on clinical concern. Stable cardiomegaly. Question if there is a hiatal hernia present. CT HIP RIGHT WO CONTRAST    Result Date: 1/5/2022  No acute fractures are identified. The appearance of the right hip on the radiograph is secondary to a ring osteophyte. A subcapital femoral neck fracture is not present. Overall, no acute abnormality identified. Small bubble of gas within urinary bladder, which is likely secondary to instrumentation. A gas-forming infection does remain in the differential, and therefore correlate with any clinical evidence of urinary tract infection. Extensive diverticulosis of the visualized large bowel without CT evidence of diverticulitis. XR HIP 2-3 VW W PELVIS RIGHT    Result Date: 1/5/2022  No fracture or dislocation. ASSESSMENT & PLAN     IMPRESSION  This is a 81 y.o. female who presented with with nonmechanical fall. Trauma work-up negative. Found to have UTI and EDUARDO.     1. Non mechanical fall causing laceration of occipital scalp. EKG shows NSR. History of dementia. Trauma work-up negative. 2. Urinary tract infection. Started on Rocephin. Follow on culture and sensitivity testing. 3. Acute kidney injury likely due to dehydration. S/p RL 1 L, . Continue on maintenance fluids, NS 50/hr. follow BUN/creatinine  4. Hypothyroidism. Continue home dose of Synthroid 50 daily. 5. Anxiety/depression. Continue home dose of Prozac 20. Symbyax 6-25     DVT ppx: On heparin 5000 q8     PT/OT/SW: Following  Discharge Planning: CM to assist with    Florence Cline MD  Internal Medicine Resident, PGY-1  [facility]; [place], [place]  1/6/2022, 1:06 PM  .    Attestation and add on       I have discussed the care of Oni Crawley , including pertinent history and exam findings,      1/6/22    with the resident. I have seen and examined the patient and the key elements of all parts of the encounter have been performed by me . I agree with the assessment, plan and orders as documented by the resident. Principal Problem:    UTI (urinary tract infection)  Active Problems:    Hypothyroidism    EDUARDO (acute kidney injury) ([facility] [address]    Fall    Laceration of head    Hypertensive heart disease with chronic combined systolic and diastolic congestive heart failure (HCC)    Dementia (HCC)  Resolved Problems:    * No resolved hospital problems. *         --Patient has EDUARDO  Baseline creatinine 0.66  Now  -She has yet acute cystitis  On Rocephin  Has mixed dementia  Recent fall  History of depression  Healing laceration on scalp  Hypothyroidism    We will continue with hydration  Patient does have significant cardiomegaly  Chronic diastolic heart failure    Vitals:    01/05/22 2011 01/06/22 0215 01/06/22 0813   BP: 134/62 (!) 138/52 (!) 155/63   Pulse: 74 55 64   Resp: 20 18 18   Temp: 97.5 °F (36.4 °C) 97.3 °F (36.3 °C) 97 °F (36.1 °C)   TempSrc: Oral Oral Oral   SpO2: 98% 96% 97%     - ;     Florence Cline MD      [address], [address] [place] [address].    Phone [phone]   Fax: [phone]  Answering Service: [phone]

## 2022-01-06 NOTE — ED PROVIDER NOTES
[facility] ED  Emergency Department Encounter  Emergency Medicine Resident     Pt Name: Ramana Sanchez  MRN: [id number]  [place] 9/21/1930  Date of evaluation: 1/5/22  PCP:  Kartik Mack MD    [address] [place] [address]       Chief Complaint   Patient presents with    Fall     fall on saturday, hit head, denies loc       HISTORY OFPRESENT ILLNESS  (Location/Symptom, Timing/Onset, Context/Setting, Quality, Duration, Modifying Ok Come.)      Ramana Sanchez is a 81 y.o. female who presents after a fall this past weekend, 3 to 4 days ago. Patient is present with family who is translating for her. She fell back and hit her head on a vanity and this resulted in a laceration. This has otherwise healed well and she was not evaluated at that time. No loss of consciousness. She has had an intermittent headache since then, but has also been complaining of pain in the right groin and perineal region. Family was concerned for possible facial droop and altered mental status over the past day, in addition to being slightly more unsteady on her feet. Patient has not been as talkative as usual.  She does have a history of dementia as well. No abdominal pain, dysuria, urinary frequency, constipation or diarrhea. PAST MEDICAL / SURGICAL / SOCIAL / FAMILY HISTORY      has a past medical history of EDUARDO (acute kidney injury) ([address] Anxiety, Depression, Hypertensive emergency, and Thyroid disease. has no past surgical history on file.      Social History     Socioeconomic History    Marital status: Single     Spouse name: Not on file    Number of children: Not on file    Years of education: Not on file    Highest education level: Not on file   Occupational History    Not on file   Tobacco Use    Smoking status: Former Smoker    Smokeless tobacco: Not on file   Substance and Sexual Activity    Alcohol use: No    Drug use: No    Sexual activity: Not on file   Other Topics Concern    Not on file   Social History Narrative    Not on file     Social Determinants of Health     Financial Resource Strain:     Difficulty of Paying Living Expenses: Not on file   Food Insecurity:     Worried About Running Out of Food in the Last Year: Not on file    Luis Miguel of Food in the Last Year: Not on file   Transportation Needs:     Lack of Transportation (Medical): Not on file    Lack of Transportation (Non-Medical): Not on file   Physical Activity:     Days of Exercise per Week: Not on file    Minutes of Exercise per Session: Not on file   Stress:     Feeling of Stress : Not on file   Social Connections:     Frequency of Communication with Friends and Family: Not on file    Frequency of Social Gatherings with Friends and Family: Not on file    Attends [employer] Services: Not on file    Active Member of [address] [place] [address] [employer] or Organizations: Not on file    Attends Club or Organization Meetings: Not on file    Marital Status: Not on file   Intimate Partner Violence:     Fear of Current or Ex-Partner: Not on file    Emotionally Abused: Not on file    Physically Abused: Not on file    Sexually Abused: Not on file   Housing Stability:     Unable to Pay for Housing in the Last Year: Not on file    Number of [place] in the Last Year: Not on file    Unstable Housing in the Last Year: Not on file       No family history on file. Allergies:  Valium [diazepam]    Home Medications:  Prior to Admission medications    Medication Sig Start Date End Date Taking?  Authorizing Provider   fesoterodine 4 MG TB24 Take 1 tablet by mouth daily    Historical Provider, MD   Brexpiprazole 0.5 MG TABS Take 1 tablet by mouth daily    Historical Provider, MD   ranitidine (ZANTAC) 150 MG tablet Take 150 mg by mouth 2 times daily    Historical Provider, MD   fexofenadine (ALLEGRA) 180 MG tablet Take 180 mg by mouth daily    Historical Provider, MD   pregabalin (LYRICA) 25 MG capsule May increase weekly by 1 pill until comfortable or using 2 pills tid 1/21/16 3/21/16  Javier Penny MD   donepezil (ARICEPT) 10 MG tablet Take 10 mg by mouth nightly    Historical Provider, MD   gabapentin (NEURONTIN) 100 MG capsule Take 100 mg by mouth 2 times daily    Historical Provider, MD   lithium (LITHOBID) 300 MG CR tablet 1/4 of a tablet daily    Historical Provider, MD   Cholecalciferol (VITAMIN D3) 1000 UNITS CAPS Take 2 capsules by mouth daily    Historical Provider, MD   traMADol (ULTRAM) 50 MG tablet Take 50 mg by mouth 3 times daily as needed for Pain    Historical Provider, MD   nitroGLYCERIN (NITROSTAT) 0.3 MG SL tablet Place 1 tablet under the tongue every 5 minutes as needed for Chest pain 11/4/15   Sydney Sierra MD   FLUoxetine (PROZAC) 20 MG capsule Take 20 mg by mouth daily    Historical Provider, MD   levothyroxine (SYNTHROID) 50 MCG tablet Take 50 mcg by mouth Daily    Historical Provider, MD   ALPRAZolam (XANAX) 0.5 MG tablet Take 0.5 mg by mouth nightly as needed for Sleep (0.5 to 1 mg at night for sleep prn)     Historical Provider, MD   ALPRAZolam (XANAX) 0.5 MG tablet Take 0.5 mg by mouth 2 times daily Take once in the morning and again in the afternoon    Historical Provider, MD   OLANZapine-FLUoxetine (SYMBYAX) 6-25 MG CAPS capsule Take 1 capsule by mouth nightly    Historical Provider, MD       REVIEW OFSYSTEMS    (2-9 systems for level 4, 10 or more for level 5)      Review of Systems   Constitutional: Negative for chills and fever. HENT: Negative for congestion and rhinorrhea. Eyes: Negative for visual disturbance. Respiratory: Negative for cough and shortness of breath. Cardiovascular: Negative for chest pain. Gastrointestinal: Negative for abdominal pain, diarrhea, nausea and vomiting. Genitourinary: Negative for dysuria. Musculoskeletal: Positive for arthralgias. Negative for back pain and neck pain. Skin: Positive for wound. Negative for rash. Neurological: Positive for headaches.  Negative for weakness and numbness. PHYSICAL EXAM   (up to 7 for level 4, 8 or more forlevel 5)      INITIAL VITALS:   ED Triage Vitals [01/05/22 2011]   BP Temp Temp Source Pulse Resp SpO2 Height Weight   134/62 97.5 °F (36.4 °C) Oral 74 20 98 % -- --       Physical Exam  Constitutional:       General: She is not in acute distress. Appearance: Normal appearance. She is normal weight. She is not ill-appearing, toxic-appearing or diaphoretic. HENT:      Head: Normocephalic and atraumatic. Nose: Nose normal.      Mouth/Throat:      Mouth: Mucous membranes are moist.      Pharynx: Oropharynx is clear. No oropharyngeal exudate or posterior oropharyngeal erythema. Eyes:      Extraocular Movements: Extraocular movements intact. Pupils: Pupils are equal, round, and reactive to light. Cardiovascular:      Rate and Rhythm: Normal rate and regular rhythm. Heart sounds: Normal heart sounds. No murmur heard. Pulmonary:      Effort: Pulmonary effort is normal. No respiratory distress. Breath sounds: Normal breath sounds. No wheezing, rhonchi or rales. Abdominal:      General: There is no distension. Palpations: Abdomen is soft. Tenderness: There is no abdominal tenderness. There is no guarding or rebound. Musculoskeletal:         General: Normal range of motion. Cervical back: Normal range of motion and neck supple. Right lower leg: No edema. Left lower leg: No edema. Comments: Tenderness palpation along the left groin region. Skin:     General: Skin is warm and dry. Comments: 4 cm healing superficial laceration on the right occiput. No swelling in that area. Neurological:      General: No focal deficit present. Mental Status: She is alert and oriented to person, place, and time. Comments: Patient has generalized weakness in all extremities but no focal motor deficits. Sensation is intact throughout.   Cranial nerves II through XII grossly intact without any obvious facial droop. He does have slight dysmetria with left finger-to-nose. Psychiatric:         Mood and Affect: Mood normal.         DIFFERENTIAL  DIAGNOSIS     PLAN (LABS / IMAGING / EKG):  Orders Placed This Encounter   Procedures    Culture, Urine    CT Head WO Contrast    CT Cervical Spine WO Contrast    XR CHEST PORTABLE    XR HIP 2-3 VW W PELVIS RIGHT    CT HIP RIGHT WO CONTRAST    CBC Auto Differential    Comprehensive Metabolic Panel w/ Reflex to MG    Troponin    Urinalysis Reflex to Culture    Microscopic Urinalysis    Basic Metabolic Panel w/ Reflex to MG    CBC auto differential    ADULT DIET;  Regular    Vital signs per unit routine    Notify physician    Up with assistance    Intake and output    Elevate Head of Bed     Monitor for signs/symptoms of urinary retention    Place intermittent pneumatic compression device    Full Code    Inpatient consult to Internal Medicine    Inpatient consult to Case Management    OT eval and treat    PT evaluation and treat    Initiate Oxygen Therapy Protocol    EKG 12 Lead    PATIENT STATUS (FROM ED OR OR/PROCEDURAL) Inpatient       MEDICATIONS ORDERED:  Orders Placed This Encounter   Medications    0.9 % sodium chloride bolus    lactated ringers bolus    donepezil (ARICEPT) tablet 10 mg    pregabalin (LYRICA) capsule 25 mg    OLANZapine-FLUoxetine (SYMBYAX) 6-25 MG capsule 1 capsule    nitroGLYCERIN (NITROSTAT) SL tablet 0.4 mg    levothyroxine (SYNTHROID) tablet 50 mcg    gabapentin (NEURONTIN) capsule 100 mg    FLUoxetine (PROZAC) capsule 20 mg    sodium chloride flush 0.9 % injection 5-40 mL    sodium chloride flush 0.9 % injection 5-40 mL    0.9 % sodium chloride infusion    OR Linked Order Group     ondansetron (ZOFRAN-ODT) disintegrating tablet 4 mg     ondansetron (ZOFRAN) injection 4 mg    polyethylene glycol (GLYCOLAX) packet 17 g    OR Linked Order Group     acetaminophen (TYLENOL) tablet 650 mg     acetaminophen (TYLENOL) suppository 650 mg    OR Linked Order Group     potassium chloride (KLOR-CON M) extended release tablet 40 mEq     potassium bicarb-citric acid (EFFER-K) effervescent tablet 40 mEq     potassium chloride 10 mEq/100 mL IVPB (Peripheral Line)    magnesium sulfate 2000 mg in 50 mL IVPB premix    heparin (porcine) injection 5,000 Units    cefTRIAXone (ROCEPHIN) 1000 mg IVPB in 50 mL D5W minibag     Order Specific Question:   Antimicrobial Indications     Answer:   Urinary Tract Infection    0.9 % sodium chloride infusion     Initial MDM/Plan/ED COURSE:    81 y.o. female who presents with altered mental status and recent fall. On exam, patient is lying comfortably in bed, in no acute distress. Vitals are all stable and within normal limits. Pulse is slightly slow at 55. Cranial nerves II through XII intact. No focal motor or sensory deficits. She has very mild dysmetria with the left upper extremity. Heart and lung exam are unremarkable. Abdomen is soft nontender. She does have some tenderness along the right groin region. 4cm laceration right posterior occiput. No other focal findings on exam.    With patient's recent fall, concern for intracranial bleed or other abnormality. She appears neurologically intact at this time. Will obtain CT of the head and CT of the neck. With her altered mental status, will rule out electrolyte abnormality, urinary tract infection, cardiac issue as the cause of this. Labs were ordered, and hest x-ray and hip x-ray ordered. CT of the hip was also added on for further evaluation of the right groin pain with recent fall. Patient is otherwise lying comfortably.       ED Course as of 01/06/22 0540   Wed Jan 05, 2022   2203 XR CHEST PORTABLE  IMPRESSION:  Mild pulmonary vascular congestion without overt pulmonary edema.     Minimal bibasilar patchy/hazy airspace opacities felt most likely on the  basis of atelectasis although pneumonia or aspiration pneumonitis could have  a similar appearance in the appropriate clinical setting.     Increased rightward bowing of the trachea could relate to aortic pathology or  lymphadenopathy/mass. Suggest further evaluation with CT chest with IV  contrast versus CTA chest depending on clinical concern.     Stable cardiomegaly.     Question if there is a hiatal hernia present.    [JS]   2203 XR HIP 2-3 VW W PELVIS RIGHT  IMPRESSION:  No fracture or dislocation. [JS]   2214 CT Head WO Contrast  IMPRESSION:  No acute intracranial abnormality.     Chronic microvascular disease. [JS]      ED Course User Index  [JS] Ahsan Bradshaw, DO      Patient's work-up notable for EDUARDO with creatinine 1.36, increased from recent creatinine of 0.66. Urine also shows evidence of infection. Patient admitted to medicine for treatment of EDUARDO and further treatment of UTI. This was discussed with the admitting team and they said they would place orders for antibiotics.     DIAGNOSTIC RESULTS / EMERGENCYDEPARTMENT COURSE / MDM     LABS:  Labs Reviewed   CBC WITH AUTO DIFFERENTIAL - Abnormal; Notable for the following components:       Result Value    Seg Neutrophils 73 (*)     Lymphocytes 14 (*)     All other components within normal limits   COMPREHENSIVE METABOLIC PANEL W/ REFLEX TO MG FOR LOW K - Abnormal; Notable for the following components:    Glucose 116 (*)     BUN 29 (*)     CREATININE 1.36 (*)     Total Protein 6.0 (*)     GFR Non- 37 (*)     GFR  44 (*)     All other components within normal limits   URINE RT REFLEX TO CULTURE - Abnormal; Notable for the following components:    Nitrite, Urine POSITIVE (*)     Leukocyte Esterase, Urine TRACE (*)     All other components within normal limits   MICROSCOPIC URINALYSIS - Abnormal; Notable for the following components:    Bacteria, UA MANY (*)     All other components within normal limits   CULTURE, URINE   TROPONIN           CT Head WO medical condition->Emergency Medical Condition (MA) Reason for Exam: fall, head pain, ams FINDINGS: BONES/ALIGNMENT: There is no acute fracture or traumatic malalignment. DEGENERATIVE CHANGES: Multilevel degenerate changes the cervical spine identified most advanced C4 through C7. Slight anterolisthesis C3 and C4-C4 C5 identified 1-2 mm. Otherwise moderate multilevel facet arthropathy identified greatest on the left. SOFT TISSUES: There is no prevertebral soft tissue swelling. Left mastoid effusion. No coalescence. Air-fluid level identified within the upper thoracic esophagus. Multilevel degenerate change. No acute fracture traumatic malalignment. RECOMMENDATIONS: Unavailable     XR CHEST PORTABLE    Result Date: 1/5/2022  EXAMINATION: ONE XRAY VIEW OF THE CHEST 1/5/2022 9:17 pm COMPARISON: 11/03/2015. HISTORY: ORDERING SYSTEM PROVIDED HISTORY: fall, ams TECHNOLOGIST PROVIDED HISTORY: fall, ams FINDINGS: Overlying items external to the patient somewhat limit evaluation. Low lung volumes likely on the basis of patient inspiratory effort. This results in some crowding of the pulmonary markings. Suspect that there is superimposed mild pulmonary vascular congestion without overt pulmonary edema. Minimal bibasilar patchy/hazy airspace opacities. No definite pleural effusions. No pneumothoraces. Rightward bowing of the trachea present on prior exam but somewhat accentuated on current exam.  Stable cardiomegaly. Question if there is a hiatal hernia present. Mediastinal silhouette is within normal limits. No acute bony abnormality. Mild pulmonary vascular congestion without overt pulmonary edema. Minimal bibasilar patchy/hazy airspace opacities felt most likely on the basis of atelectasis although pneumonia or aspiration pneumonitis could have a similar appearance in the appropriate clinical setting. Increased rightward bowing of the trachea could relate to aortic pathology or lymphadenopathy/mass. Suggest further evaluation with CT chest with IV contrast versus CTA chest depending on clinical concern. Stable cardiomegaly. Question if there is a hiatal hernia present. CT HIP RIGHT WO CONTRAST    Result Date: 1/5/2022  EXAMINATION: CT OF THE RIGHT HIP WITHOUT CONTRAST 1/5/2022 6:42 pm TECHNIQUE: CT of the right hip was performed without the administration of intravenous contrast.  Multiplanar reformatted images are provided for review. Dose modulation, iterative reconstruction, and/or weight based adjustment of the mA/kV was utilized to reduce the radiation dose to as low as reasonably achievable. COMPARISON: Hip radiograph performed earlier today. HISTORY ORDERING SYSTEM PROVIDED HISTORY: fall, difficulty ambulating-?subcapital fx right femur TECHNOLOGIST PROVIDED HISTORY: fall, difficulty ambulating-?subcapital fx right femur Decision Support Exception - unselect if not a suspected or confirmed emergency medical condition->Emergency Medical Condition (MA) Reason for Exam: fall, difficulty ambulating-?subcapital fx right femur FINDINGS: The pelvic and obturator rings are intact. Pubic symphysis and sacroiliac joints are congruent. Evaluation of the right hip shows no stress, insufficiency, or traumatic fracture. The appearance on the radiograph is secondary to ring osteophyte formation. Overall, there are moderate degenerative changes. Chondrocalcinosis is noted. No joint effusion is found. No stress, insufficiency, or traumatic fractures are detected within the left hip. No joint effusion is seen. Regional muscles are symmetric bilaterally, with maintenance of intramuscular fat planes. Uterus and adnexa regions unremarkable. Bubble of gas is noted within urinary bladder. Urinary bladder otherwise unremarkable. No free pelvic fluid is found. Extensive diverticulosis is seen within the visualized large bowel, especially in the sigmoid region. No CT evidence of diverticulitis.      No acute fractures are identified. The appearance of the right hip on the radiograph is secondary to a ring osteophyte. A subcapital femoral neck fracture is not present. Overall, no acute abnormality identified. Small bubble of gas within urinary bladder, which is likely secondary to instrumentation. A gas-forming infection does remain in the differential, and therefore correlate with any clinical evidence of urinary tract infection. Extensive diverticulosis of the visualized large bowel without CT evidence of diverticulitis. XR HIP 2-3 VW W PELVIS RIGHT    Result Date: 1/5/2022  EXAMINATION: ONE XRAY VIEW OF THE PELVIS AND TWO XRAY VIEWS RIGHT HIP 1/5/2022 9:17 pm COMPARISON: None. HISTORY: ORDERING SYSTEM PROVIDED HISTORY: fall, pain in perineum TECHNOLOGIST PROVIDED HISTORY: fall, pain in perineum FINDINGS: Mild degenerate changes of the right. No displaced fracture, dislocation, or focal osseous lesion is noted. There is mild cortical irregularity involving the right inferior pubic ramus which could be chronic. Overlying skin folds challenge evaluation. No significant soft tissue abnormality seen. No fracture or dislocation. EKG  EKG Interpretation    Interpreted by me    Rhythm: normal sinus   Rate: normal  Axis: normal  Ectopy: none  Conduction: normal, poor R wave progression  ST Segments: no acute change  T Waves: no acute change  Q Waves: none    Clinical Impression: no acute changes and normal EKG    All EKG's are interpreted by the Emergency Department Physicianwho either signs or Co-signs this chart in the absence of a cardiologist.      PROCEDURES:  None    CONSULTS:  IP CONSULT TO INTERNAL MEDICINE  IP CONSULT TO CASE MANAGEMENT    CRITICAL CARE:  Please see attending note    FINAL IMPRESSION      1. Injury of head, initial encounter    2. EDUARDO (acute kidney injury) ([facility] [address]    3.  Urinary tract infection without hematuria, site unspecified          DISPOSITION / PLAN     DISPOSITION Admitted 01/06/2022 12:36:35 AM      PATIENT REFERRED TO:  No follow-up provider specified.     DISCHARGE MEDICATIONS:  Current Discharge Medication List          Rosalba Trinh DO  Emergency Medicine Resident    (Please note that portions of this note were completed with a voice recognition program.Efforts were made to edit the dictations but occasionally words are mis-transcribed.)       Rosalba Trinh DO  Resident  01/06/22 0658

## 2022-01-06 NOTE — PROGRESS NOTES
Occupational Therapy   Occupational Therapy Initial Assessment  Date: 2022   Patient Name: Vernell Lozoya  MRN: [id number]     : 1930    Date of Service: 2022    Chief Complaint   Patient presents with    Fall     fall on saturday, hit head, denies loc     Discharge Recommendations:  Patient would benefit from continued therapy after discharge Pt is currently unsafe to return to their prior living arrangements without / assist/supervision to safely engage in all aspects of ADLs, IADLs and functional mobility tasks. OT Equipment Recommendations  Equipment Needed: Yes  Mobility Devices: ADL Assistive Devices; Tamara [place]: Rolling  ADL Assistive Devices: Shower Chair with back;Grab Bars - shower; Toileting - Drop Arm Commode, Heavy Duty Drop Arm Commode    Assessment   Performance deficits / Impairments: Decreased functional mobility ; Decreased ADL status; Decreased endurance;Decreased high-level IADLs;Decreased safe awareness;Decreased cognition;Decreased strength  Assessment: Pt supine in bed upon arrival and completed supine to sit transfer with Min hand held assist. Completed functional sit<>stand transfers with Min A and functional mobility with Min A. Assist for walker navigation. Max VCs w/ fair to poor carryover. Pt required Min A for toileting and UB dressing. Limited by fatigue and cognition. Pt is expected to require skilled OT services during their acute hospitalization stay to address the above noted deficits through skilled occupational therapy intervention for promotion of increased independence throughout ADLs, IADLs and functional mobility tasks.    Prognosis: Good  Decision Making: Medium Complexity  Patient Education: Pt educated on OT role, OT POC, activity promotion, energy conservation, walker management-fair return  Barriers to Learning: Cognition and language barrier  REQUIRES OT FOLLOW UP: Yes  Activity Tolerance  Activity Tolerance: Patient limited by fatigue;Treatment assistance  Homemaking Responsibilities: No  Ambulation Assistance: Independent  Transfer Assistance: Independent  Active : No  Additional Comments:  was used at beginning of social functional questions, but lost connection. Pt not responding well to writer or inerpreter, continously asking for family and stating \"I am tired\". Objective   Vision: Impaired  Vision Exceptions: Wears glasses for reading  Hearing: Within functional limits          Balance  Sitting Balance: Stand by assistance (Seated EOB unsupported for UB dressing, ROM/MMT and toileting ~15 minutes)  Standing Balance: Contact guard assistance  Standing Balance  Time: 5 min  Activity: static standing EOB, standing for toileting  Comment: Unsteady, forward flexed posture, no LOB    Functional Mobility  Functional - Mobility Device: Rolling Walker  Activity: To/from bathroom  Assist Level: Minimal assistance  Functional Mobility Comments: Required assist for walker navigation to progress and navigate. Difficulty with turns. Max verbal cues with fair to poor return    Toilet Transfers  Toilet - Technique: Ambulating  Equipment Used: Standard toilet  Toilet Transfer: Minimal assistance  Toilet Transfers Comments: Use of B rails, cues for hand placement on walker with good return, poor carryover    ADL  Feeding: Independent  Grooming: Stand by assistance;Verbal cueing;Setup; Increased time to complete  UE Bathing: Minimal assistance;Verbal cueing;Setup; Increased time to complete  LE Bathing: Minimal assistance;Verbal cueing; Increased time to complete  UE Dressing: Minimal assistance;Verbal cueing;Setup; Increased time to complete (Min to assist with threading d/t cognition, pt having difficulty understanding direction)  LE Dressing: Minimal assistance;Verbal cueing;Setup; Increased time to complete  Toileting: Minimal assistance;Setup; Increased time to complete (Min A to button undergarments standing at toilet.  Pt dynamically stood to complete this but fatigued. Completed pericare seated at toilet with CGA.)     Tone RUE  RUE Tone: Normotonic  Tone LUE  LUE Tone: Normotonic  Coordination  Movements Are Fluid And Coordinated: Yes     Bed mobility  Sit to Supine: Minimal assistance (Hand held assist for trunk)  Scooting: Minimal assistance (LE progression)     Transfers  Sit to stand: Minimal assistance  Stand to sit: Minimal assistance  Transfer Comments: Use of RW,cues for hand placement on walker with good return, poor carryover     Cognition  Overall Cognitive Status: Exceptions  Arousal/Alertness: Delayed responses to stimuli  Following Commands:  Follows one step commands with repetition  Attention Span: Difficulty attending to directions  Memory: Decreased short term memory  Problem Solving: Assistance required to identify errors made;Assistance required to generate solutions  Insights: Decreased awareness of deficits  Initiation: Requires cues for all  Sequencing: Requires cues for some        Sensation  Overall Sensation Status: WFL (Denies any numbness/tingling)        LUE AROM (degrees)  LUE AROM : WFL  Left Hand AROM (degrees)  Left Hand AROM: WFL  RUE AROM (degrees)  RUE AROM : WFL  Right Hand AROM (degrees)  Right Hand AROM: WFL  LUE Strength  Gross LUE Strength: Exceptions to [facility]  L Shoulder Flex: 4-/5  L Shoulder Ext: 4-/5  L Elbow Flex: 4-/5  L Elbow Ext: 4-/5  L Hand General: 4-/5  RUE Strength  Gross RUE Strength: Exceptions to [facility]  R Shoulder Flex: 4-/5  R Shoulder Ext: 4-/5  R Elbow Flex: 4-/5  R Elbow Ext: 4-/5  R Hand General: 4-/5                   Plan   Plan  Times per week: 3-4x/wk  Current Treatment Recommendations: Safety Education & Training,Balance Training,Patient/Caregiver Education & Training,Self-Care / ADL,Functional Mobility Training,Equipment Evaluation, Education, & procurement,Home Management Training,Endurance Training,Cognitive Reorientation,Cognitive/Perceptual Training,Strengthening    AM-PAC Score AM-PAC Inpatient Daily Activity Raw Score: 19 (01/06/22 1714)  AM-PAC Inpatient ADL T-Scale Score : 40.22 (01/06/22 1714)  ADL Inpatient CMS 0-100% Score: 42.8 (01/06/22 1714)  ADL Inpatient CMS G-Code Modifier : CK (01/06/22 1714)    Goals  Short term goals  Time Frame for Short term goals: By discharge, pt will:  Short term goal 1: Demo bed mobility with SBA and 1 VC  Short term goal 2: Demo functional sit<>stand transfers with SBA and LRD  Short term goal 3: Demo functional mobility with CGA using LRD and <3 VCs  Short term goal 4: Demo +8 minutes of dynamic standing tolerance with SBA to promote increased engagement in ADLs  Short term goal 5: Demo UB ADLs with SBA  Short term goal 6: Demo LB ADLs with CGA nad DME PRN  Short term goal 7:  Follow 80% of 2 step commands throughout all functional tasks to promote increased engagement in ADLs       Therapy Time   Individual Concurrent Group Co-treatment   Time In 1035         Time Out 1109         Minutes 34         Timed Code Treatment Minutes: [address] [facility], OTR/L

## 2022-01-06 NOTE — ED PROVIDER NOTES
I performed a history and physical examination of the patient and discussed management with the resident. I reviewed the residents note and agree with the documented findings and plan of care. Any areas of disagreement are noted on the chart. I was personally present for the key portions of any procedures. I have documented in the chart those procedures where I was not present during the key portions. I have reviewed the emergency nurses triage note. I agree with the chief complaint, past medical history, past surgical history, allergies, medications, social and family history as documented unless otherwise noted below. Documentation of the HPI, Physical Exam and Medical Decision Making performed by medical students or scribes is based on my personal performance of the HPI, PE and MDM. For Phys Assistant/ Nurse Practitioner cases/documentation I have personally evaluated this patient and have completed at least one if not all key elements of the E/M (history, physical exam, and MDM). I find the patient's history and physical exam are consistent with the NP/PA documentation. I agree with the care provided, treatment rendered, disposition and followup plan. Additional findings are as noted. Alma Trent. Joshua Buerger, MD  Attending Emergency  Physician      This patient presents to the emergency department accompanied by her family including Latanya Maya, her son. He reports that the patient was staying with a daughter in [place] over the weekend when she apparently lost her balance while standing in a vanity and fell backwards striking her head and sustaining a laceration to her occipital scalp. It is unclear whether she sustained a loss of consciousness but does not appear to be the case per Dr. Alina Dickerson. She is presenting now with increasing confusion and unsteady gait. There is been no subsequent reinjury. Past medical history is significant for dementia and hypertension. Is been no vomiting.   No obvious numbness or tingling. No difficulty breathing, speaking, swallowing. No apparent chest pain or difficulty breathing. Note that the patient son, Dr. Mati Barrientos is acting as /. Awake, alert. cooperative, responsive, oriented x4. GCS-15. Perrl, EOMI, fundi-unable to be visualized apparently due to cataract. . CN's II-XII intact. Neck supple, nontender. Gait not tested. Speech fluent, normal comprehension. No focal motor or sensory deficits. Examination of scalp reveals a healing laceration of the right occipital scalp. There is no crepitus or deformity palpable. Lungs are clear to auscultation bilaterally. Cardiac exam demonstrates an S1-S2, regular rate and rhythm. Abdomen is soft, nondistended, nontender. Normal bowel sounds noted. Examination of the lower extremities reveals no tenderness with passive range of motion of the thighs on the hips bilaterally. There is minimal discomfort with internal rotation of either thigh with the hips in flexion. Distal pulses and capillary refill are normal.  Palpation of the pelvis demonstrates some tenderness with compression over the pubic symphysis. No swelling or crepitus are noted. Plain radiographs have been obtained and reviewed. Radiologist interpretation is pending. No obvious acute fracture is noted. EKG Interpretation    Interpreted by me    Rhythm: normal sinus   Rate: normal  Axis: normal  Ectopy: none  Conduction: normal  ST Segments: no acute change  T Waves: no acute change  Q Waves: none  Poor R wave progression. Clinical Impression: no acute changes and no significant morphologic changes noted when compared with prior tracing dated 11/3/15. Radiographs and radiologist interpretations of radiology studies have been reviewed. No obvious acute fractures or other significant abnormalities of been identified. Lab results have been reviewed. Note is made of the elevated BUN and creatinine. Urinalysis is pending.   I discussed disposition with the patient's family and given her lack of mobility at this point time we will arrange admission to the internal medicine service, provide IV fluids, and await the urinalysis result                Milan Alejandra MD  01/05/22 0106

## 2022-01-06 NOTE — PROGRESS NOTES
Physical Therapy    Facility/Department: [address] MED SURG  Initial Assessment    NAME: Ayanna Patel  : 1930  MRN: [id number]  Chief Complaint   Patient presents with    Fall     fall on saturday, hit head, denies loc     Date of Service: 2022    Discharge Recommendations:  Continue to assess pending progress,Patient would benefit from continued therapy after discharge   PT Equipment Recommendations  Equipment Needed: Yes  Mobility Devices: Wheelchair  Wheelchair: Standard    Assessment   Assessment: Pt admitted secondary to falling and hitting her head. Currently patient states she does not want to get oob and mobilize secondary to having a significant HA. Pt was willing to participate in a bedside evaluation. will assess mobility next session as patient tolerates. Requires use of . Prognosis: Good  Decision Making: Medium Complexity  PT Education: PT Role  REQUIRES PT FOLLOW UP: Yes  Activity Tolerance  Activity Tolerance: Pt declined out of bed secondary to increased headache pain. Therapist was later able to assess:  Pt with increased SOB upon exertion. All activity is slow with increased labor. Patient Diagnosis(es): The primary encounter diagnosis was Injury of head, initial encounter. Diagnoses of EDUARDO (acute kidney injury) ([address] and Urinary tract infection without hematuria, site unspecified were also pertinent to this visit. has a past medical history of EDUARDO (acute kidney injury) ([address] Anxiety, Depression, Hypertensive emergency, Hypertensive heart disease with chronic combined systolic and diastolic congestive heart failure ([address] and Thyroid disease. has no past surgical history on file.     Restrictions  Restrictions/Precautions  Restrictions/Precautions: Fall Risk,Up as Tolerated (up with assistance)  Required Braces or Orthoses?: No  Vision/Hearing  Vision: Impaired  Vision Exceptions: Wears glasses for reading (per patient's report, she wears glasses for reading.)  Hearing: Within functional limits     Subjective  General  Response To Previous Treatment: Not applicable  Family / Caregiver Present: Yes (Pt sitter present during evaluation)  Follows Commands: Impaired  Other (Comment): pt appears slow to process commands, most likely secondary to English being her second language. as well as having dementia. Subjective  Subjective: Nursing and patient agreeable to PT evaluation, however, patient reports she has headache and does not want to get OOB. Pain Screening  Patient Currently in Pain: Yes  Pain Assessment  Pain Level: 5  Vital Signs  Patient Currently in Pain: Yes       Orientation  Orientation  Overall Orientation Status: Impaired  Orientation Level: Oriented to situation (Pt  states she cannot remember alot of her daily activities and things she does throughout the day.)  Social/Functional History  Social/Functional History  Lives With: Daughter (Pt lives with daughter named Miguel Hargrove)  Type of Home: House  Home Layout: One level  Home Access: Level entry  Bathroom Shower/Tub: Tub/Shower unit  Bathroom Equipment:  (Pt has no DME per her report)  Home Equipment: Standard walker  Receives Help From: Family  ADL Assistance: Needs assistance  Homemaking Assistance: Needs assistance  Homemaking Responsibilities: No  Ambulation Assistance: Independent (Per patient, she walks independently.)  Transfer Assistance: Independent (Pt patient, she transfers independently.)  Active : No  Cognition   Cognition  Overall Cognitive Status: Exceptions  Arousal/Alertness: Delayed responses to stimuli  Following Commands:  Follows one step commands consistently  Attention Span: Appears intact  Memory: Decreased short term memory  Initiation: Requires cues for all    Objective          AROM RLE (degrees)  RLE AROM: WFL  AROM LLE (degrees)  LLE AROM : WFL  Strength RLE  Strength RLE: WFL  Strength LLE  Strength LLE: WFL  Tone RLE  RLE Tone: Normotonic  Tone LLE  LLE Tone: Normotonic  Sensation  Overall Sensation Status: WFL (pt denies N/T bilateral LEs/UEs)  Bed mobility  Comment: pt declined mobility secondary to HA. Per patient sitter, patient has been able to get oob with only HHA. This therapist was later able to assess:  Min assist x 1 for left rolling. HOB elevated  Transfers  Comment: Pt declined mobility portion of evaluation secondary to HA. Per patient sitter, patient has gone from bed to bathroom with HHA. This therapist was later able to assess:  EOB transfer min assist x 1, scooting EOB min assist x 1. Sit <> stand transfer min assist x 1. Use of RW for all transfers/  Ambulation  Ambulation?: Yes. Pt amb 30 feet x 1 with RW and min assist x 1. Wide base of supports, decrease step length and step height. Slow jeremiah. Stairs/Curb   Stairs?: No     Balance:  Good static sitting balance, fair dynamic sitting balance, fair standing balance with RW, fair min- dynamic standing balance with RW. Comments: Use of RW to assess balance. Plan   Plan  Times per week: 5-6x/week  Times per day: Daily  Current Treatment Recommendations: Strengthening,Functional Mobility Training,Transfer Training,Gait Training,Endurance Training  Safety Devices  Type of devices: Left in bed,Nurse notified  Restraints  Initially in place: No      AM-PAC Score             Goals  Short term goals  Time Frame for Short term goals: 14 days  Short term goal 1: Pt will sit EOB with modified independence prior to discharge. Short term goal 2: Pt will participate in bedside ther exs for at least 15 -20 minutes to improve endurance for out of bed activities. Short term goal 3: Pt will demonstrate bed mobility with modified independence prior to discharge. Short term goal 4: Pt will ambulate  at least 25 feet x 1 with least restrictive device CGA x 1  Short term goal 5: patient will be SBA/CGA x 1 for all functional transfers with use of least restrictive device.   Patient Goals   Patient goals : to have decreased HA pain       Therapy Time   Individual Concurrent Group Co-treatment   Time In 0102         Time Out 0120         Minutes [address], PT

## 2022-01-07 VITALS
SYSTOLIC BLOOD PRESSURE: 182 MMHG | OXYGEN SATURATION: 97 % | TEMPERATURE: 97.5 F | RESPIRATION RATE: 16 BRPM | DIASTOLIC BLOOD PRESSURE: 89 MMHG | HEART RATE: 79 BPM

## 2022-01-07 PROBLEM — I11.0 HYPERTENSIVE HEART DISEASE WITH DIASTOLIC HEART FAILURE (HCC): Status: ACTIVE | Noted: 2022-01-07

## 2022-01-07 PROBLEM — I50.30 HYPERTENSIVE HEART DISEASE WITH DIASTOLIC HEART FAILURE (HCC): Status: ACTIVE | Noted: 2022-01-07

## 2022-01-07 LAB
ABSOLUTE EOS #: 0.18 K/UL (ref 0–0.44)
ABSOLUTE IMMATURE GRANULOCYTE: 0.03 K/UL (ref 0–0.3)
ABSOLUTE LYMPH #: 2.24 K/UL (ref 1.1–3.7)
ABSOLUTE MONO #: 0.67 K/UL (ref 0.1–1.2)
ANION GAP SERPL CALCULATED.3IONS-SCNC: 9 MMOL/L (ref 9–17)
BASOPHILS # BLD: 1 % (ref 0–2)
BASOPHILS ABSOLUTE: 0.07 K/UL (ref 0–0.2)
BNP INTERPRETATION: NORMAL
BUN BLDV-MCNC: 17 MG/DL (ref 8–23)
BUN/CREAT BLD: ABNORMAL (ref 9–20)
CALCIUM SERPL-MCNC: 8.5 MG/DL (ref 8.6–10.4)
CHLORIDE BLD-SCNC: 106 MMOL/L (ref 98–107)
CO2: 24 MMOL/L (ref 20–31)
CREAT SERPL-MCNC: 1 MG/DL (ref 0.5–0.9)
DIFFERENTIAL TYPE: NORMAL
EKG ATRIAL RATE: 64 BPM
EKG ATRIAL RATE: 70 BPM
EKG P AXIS: 63 DEGREES
EKG P AXIS: 64 DEGREES
EKG P-R INTERVAL: 172 MS
EKG P-R INTERVAL: 178 MS
EKG Q-T INTERVAL: 436 MS
EKG Q-T INTERVAL: 444 MS
EKG QRS DURATION: 80 MS
EKG QRS DURATION: 84 MS
EKG QTC CALCULATION (BAZETT): 458 MS
EKG QTC CALCULATION (BAZETT): 470 MS
EKG R AXIS: 58 DEGREES
EKG R AXIS: 65 DEGREES
EKG T AXIS: 53 DEGREES
EKG T AXIS: 62 DEGREES
EKG VENTRICULAR RATE: 64 BPM
EKG VENTRICULAR RATE: 70 BPM
EOSINOPHILS RELATIVE PERCENT: 2 % (ref 1–4)
GFR AFRICAN AMERICAN: >60 ML/MIN
GFR NON-AFRICAN AMERICAN: 52 ML/MIN
GFR SERPL CREATININE-BSD FRML MDRD: ABNORMAL ML/MIN/{1.73_M2}
GFR SERPL CREATININE-BSD FRML MDRD: ABNORMAL ML/MIN/{1.73_M2}
GLUCOSE BLD-MCNC: 95 MG/DL (ref 70–99)
HCT VFR BLD CALC: 38.4 % (ref 36.3–47.1)
HEMOGLOBIN: 12.4 G/DL (ref 11.9–15.1)
IMMATURE GRANULOCYTES: 0 %
LYMPHOCYTES # BLD: 26 % (ref 24–43)
MCH RBC QN AUTO: 29.7 PG (ref 25.2–33.5)
MCHC RBC AUTO-ENTMCNC: 32.3 G/DL (ref 28.4–34.8)
MCV RBC AUTO: 92.1 FL (ref 82.6–102.9)
MONOCYTES # BLD: 8 % (ref 3–12)
NRBC AUTOMATED: 0 PER 100 WBC
PDW BLD-RTO: 12.6 % (ref 11.8–14.4)
PLATELET # BLD: 200 K/UL (ref 138–453)
PLATELET ESTIMATE: NORMAL
PMV BLD AUTO: 8.9 FL (ref 8.1–13.5)
POTASSIUM SERPL-SCNC: 4.5 MMOL/L (ref 3.7–5.3)
PRO-BNP: 246 PG/ML
RBC # BLD: 4.17 M/UL (ref 3.95–5.11)
RBC # BLD: NORMAL 10*6/UL
SEG NEUTROPHILS: 63 % (ref 36–65)
SEGMENTED NEUTROPHILS ABSOLUTE COUNT: 5.36 K/UL (ref 1.5–8.1)
SODIUM BLD-SCNC: 139 MMOL/L (ref 135–144)
TSH SERPL DL<=0.05 MIU/L-ACNC: 1.91 MIU/L (ref 0.3–5)
WBC # BLD: 8.6 K/UL (ref 3.5–11.3)
WBC # BLD: NORMAL 10*3/UL

## 2022-01-07 PROCEDURE — 2580000003 HC RX 258

## 2022-01-07 PROCEDURE — 80048 BASIC METABOLIC PNL TOTAL CA: CPT

## 2022-01-07 PROCEDURE — 6370000000 HC RX 637 (ALT 250 FOR IP): Performed by: INTERNAL MEDICINE

## 2022-01-07 PROCEDURE — 84443 ASSAY THYROID STIM HORMONE: CPT

## 2022-01-07 PROCEDURE — 6360000002 HC RX W HCPCS

## 2022-01-07 PROCEDURE — 99233 SBSQ HOSP IP/OBS HIGH 50: CPT | Performed by: INTERNAL MEDICINE

## 2022-01-07 PROCEDURE — 6370000000 HC RX 637 (ALT 250 FOR IP)

## 2022-01-07 PROCEDURE — 36415 COLL VENOUS BLD VENIPUNCTURE: CPT

## 2022-01-07 PROCEDURE — 83880 ASSAY OF NATRIURETIC PEPTIDE: CPT

## 2022-01-07 PROCEDURE — 85025 COMPLETE CBC W/AUTO DIFF WBC: CPT

## 2022-01-07 RX ORDER — AMOXICILLIN AND CLAVULANATE POTASSIUM 875; 125 MG/1; MG/1
1 TABLET, FILM COATED ORAL EVERY 12 HOURS SCHEDULED
Status: CANCELLED | OUTPATIENT
Start: 2022-01-07 | End: 2022-01-15

## 2022-01-07 RX ORDER — AMOXICILLIN AND CLAVULANATE POTASSIUM 875; 125 MG/1; MG/1
1 TABLET, FILM COATED ORAL 2 TIMES DAILY
Qty: 16 TABLET | Refills: 0 | Status: SHIPPED | OUTPATIENT
Start: 2022-01-07 | End: 2022-01-15

## 2022-01-07 RX ADMIN — LEVOTHYROXINE SODIUM 50 MCG: 50 TABLET ORAL at 09:25

## 2022-01-07 RX ADMIN — FAMOTIDINE 20 MG: 20 TABLET, FILM COATED ORAL at 09:06

## 2022-01-07 RX ADMIN — GABAPENTIN 100 MG: 100 CAPSULE ORAL at 09:06

## 2022-01-07 RX ADMIN — SODIUM CHLORIDE: 9 INJECTION, SOLUTION INTRAVENOUS at 02:16

## 2022-01-07 RX ADMIN — FLUOXETINE HYDROCHLORIDE 20 MG: 20 CAPSULE ORAL at 09:06

## 2022-01-07 RX ADMIN — CEFTRIAXONE SODIUM 1000 MG: 1 INJECTION, POWDER, FOR SOLUTION INTRAMUSCULAR; INTRAVENOUS at 02:05

## 2022-01-07 RX ADMIN — PREGABALIN 25 MG: 25 CAPSULE ORAL at 09:24

## 2022-01-07 NOTE — PROGRESS NOTES
[facility]  Internal Medicine Teaching Residency Program  Inpatient Daily Progress Note  ______________________________________________________________________________    Patient: Ramana Sanchez  YOB: 1930   ZLZ:[id number]    Acct: [de-identified]     Room: [facility]0350-01  Admit date: 1/5/2022  Today's date: 01/07/22  Number of days in the hospital: 1    SUBJECTIVE   Admitting Diagnosis: UTI (urinary tract infection)  CC: None mechanical fall, scalp laceration  Pt examined at bedside. Chart & results reviewed. No acute events overnight  Vitals are as following:  /61     pulse 75     temp max 97.5        Intake output record not maintained  Sodium is 139, potassium 4.5, creatinine trended down to 1 from 1.36 while normal is around 0.6. proBNP is 246  Echocardiogram shows EF 65% with grade 1 diastolic dysfunction, mitral stenosis and aortic valve sclerosis  TSH is 1.91 and lithium level is less than 0.1.      ROS:  Constitutional:  negative for chills, fevers, sweats  Respiratory:  negative for cough, dyspnea on exertion, hemoptysis, shortness of breath, wheezing  Cardiovascular:  negative for chest pain, chest pressure/discomfort, lower extremity edema, palpitations  Gastrointestinal:  negative for abdominal pain, constipation, diarrhea, nausea, vomiting  Neurological:  negative for dizziness, headache  BRIEF HISTORY     The patient is a pleasant 81 y.o. female with PMH of dementia, depression, anxiety, hypothyroidism presents with a chief complaint of a nonmechanical fall 3 days ago. History obtained from son. Patient does not understand [place]. She fell back, hit her head and had laceration of occipital scalp. Not evaluated at that time, unclear about loss of consciousness, no seizures like activity. Report intermittent headaches and pelvic pain.   Family is concerned about facial droop, altered mental status, decreased responsiveness and unsteady gait. Dr. Deepika Sauceda is /. denies any nausea, vomiting abdominal pain, chest, shortness of breath, urgency, diarrhea.     On arrival to ED patient was AAO x4, GCS 15. EOMI. CN II to XII intact. No focal motor or sensory neurologic deficit. Examination of the scalp revealed healing laceration of the right occipital region. No crepitus or discharge noted. Tenderness to palpation over the hypogastric area. No distention on abdomin noted. OBJECTIVE     Vital Signs:  /61   Pulse 75   Temp 97.5 °F (36.4 °C) (Oral)   Resp 19   SpO2 95%     Temp (24hrs), Av.3 °F (36.3 °C), Min:97 °F (36.1 °C), Max:97.5 °F (36.4 °C)    No intake/output data recorded. Physical Exam:  Constitutional:alert, oriented, cooperative and in no apparent distress. Head:normocephalic and atraumatic. EENT:  PERRLA. No conjunctival injections. Septum was midline, mucosa was without erythema, exudates or cobblestoning. No thrush was noted. Neck: Supple without thyromegaly. No elevated JVP. Trachea was midline. Respiratory: Chest was symmetrical without dullness to percussion. Breath sounds bilaterally were clear to auscultation. There were no wheezes, rhonchi or rales. There is no intercostal retraction or use of accessory muscles. No egophony noted. Cardiovascular: Regular without murmur, clicks, gallops or rubs. Abdomen: Slightly rounded and soft without organomegaly. No rebound, rigidity or guarding was appreciated. .  Musculoskeletal: Normal curvature of the spine. No gross muscle weakness. Extremities: Bilateral lower extremity edema, no ulcerations, tenderness, varicosities or erythema. Muscle size, tone and strength are normal.  No involuntary movements are noted. Skin:  Warm and dry. Good color, turgor and pigmentation. No lesions or scars.   No cyanosis or clubbing  Neurological/Psychiatric: The patient's general behavior, level of consciousness, thought content and emotional status is normal.        Medications:  Scheduled Medications:    donepezil  10 mg Oral Nightly    pregabalin  25 mg Oral TID    OLANZapine-FLUoxetine  1 capsule Oral Nightly    levothyroxine  50 mcg Oral Daily    gabapentin  100 mg Oral BID    FLUoxetine  20 mg Oral Daily    sodium chloride flush  5-40 mL IntraVENous 2 times per day    heparin (porcine)  5,000 Units SubCUTAneous 3 times per day    cefTRIAXone (ROCEPHIN) IV  1,000 mg IntraVENous Q24H    famotidine  20 mg Oral BID     Continuous Infusions:    sodium chloride      sodium chloride 50 mL/hr at 01/07/22 0216     PRN MedicationsnitroGLYCERIN, 0.4 mg, Q5 Min PRN  sodium chloride flush, 5-40 mL, PRN  sodium chloride, 25 mL, PRN  ondansetron, 4 mg, Q8H PRN   Or  ondansetron, 4 mg, Q6H PRN  polyethylene glycol, 17 g, Daily PRN  acetaminophen, 650 mg, Q6H PRN   Or  acetaminophen, 650 mg, Q6H PRN  potassium chloride, 40 mEq, PRN   Or  potassium alternative oral replacement, 40 mEq, PRN   Or  potassium chloride, 10 mEq, PRN  magnesium sulfate, 2,000 mg, PRN        Diagnostic Labs:  CBC:   Recent Labs     01/05/22 2115 01/07/22  0615   WBC 8.8 8.6   RBC 4.12 4.17   HGB 12.5 12.4   HCT 37.9 38.4   MCV 92.0 92.1   RDW 12.6 12.6    200     BMP:   Recent Labs     01/05/22 2115 01/07/22  0615    139   K 3.9 4.5    106   CO2 25 24   BUN 29* 17   CREATININE 1.36* 1.00*     BNP: No results for input(s): BNP in the last 72 hours. PT/INR: No results for input(s): PROTIME, INR in the last 72 hours. APTT: No results for input(s): APTT in the last 72 hours. CARDIAC ENZYMES: No results for input(s): CKMB, CKMBINDEX, TROPONINI in the last 72 hours.     Invalid input(s): CKTOTAL;3  FASTING LIPID PANEL:  Lab Results   Component Value Date    CHOL 152 11/04/2015    HDL 57 11/04/2015    TRIG 69 11/04/2015     LIVER PROFILE:   Recent Labs     01/05/22 2115   AST 13   ALT 14   BILITOT 0.55   ALKPHOS 88      MICROBIOLOGY:   Lab Results Component Value Date/Time    CULTURE CULTURE IN PROGRESS 01/05/2022 11:03 PM       Imaging:    CT Head WO Contrast    Result Date: 1/5/2022  No acute intracranial abnormality. Chronic microvascular disease. RECOMMENDATIONS: Unavailable     CT Cervical Spine WO Contrast    Result Date: 1/5/2022  Multilevel degenerate change. No acute fracture traumatic malalignment. RECOMMENDATIONS: Unavailable     XR CHEST PORTABLE    Result Date: 1/5/2022  Mild pulmonary vascular congestion without overt pulmonary edema. Minimal bibasilar patchy/hazy airspace opacities felt most likely on the basis of atelectasis although pneumonia or aspiration pneumonitis could have a similar appearance in the appropriate clinical setting. Increased rightward bowing of the trachea could relate to aortic pathology or lymphadenopathy/mass. Suggest further evaluation with CT chest with IV contrast versus CTA chest depending on clinical concern. Stable cardiomegaly. Question if there is a hiatal hernia present. CT HIP RIGHT WO CONTRAST    Result Date: 1/5/2022  No acute fractures are identified. The appearance of the right hip on the radiograph is secondary to a ring osteophyte. A subcapital femoral neck fracture is not present. Overall, no acute abnormality identified. Small bubble of gas within urinary bladder, which is likely secondary to instrumentation. A gas-forming infection does remain in the differential, and therefore correlate with any clinical evidence of urinary tract infection. Extensive diverticulosis of the visualized large bowel without CT evidence of diverticulitis. XR HIP 2-3 VW W PELVIS RIGHT    Result Date: 1/5/2022  No fracture or dislocation. ASSESSMENT & PLAN     IMPRESSION  This is a 81 y.o. female who presented with with nonmechanical fall. Trauma work-up negative. Found to have UTI and EDUARDO.     1. Non mechanical fall-more likely due to postural drop, EKG NSR, Trops 14, Pro-, Echo EF 65%.   Check orthostatics. PT/OT working with the patient. 2. Urinary tract infection-Continue Rocephin. C/S pending. Discharged home on Augmentin for 8 days. 3. Acute kidney injury-probably due to dehydration. Lithium level normal and creatinine is improving  4. Hypothyroidism-continue levothyroxine 50 mcg daily ,TSH 1.91  5. Bipolar disorder: Continue lithium, lithium level less than 0.1  6. Anxiety/depression. Continue home dose of Prozac 20. Symbyax 6-25     DVT ppx: On heparin 5000 q8     PT/OT/SW: Following  Discharge Planning: Discharge home today    Summer Rogel MD  Internal Medicine Resident, PGY-2  [facility]; [address] [place] [address]  1/7/2022, 7:50 AM      Attestation and add on       I have discussed the care of Jules Jones , including pertinent history and exam findings,      1/7/22    with the resident. I have seen and examined the patient and the key elements of all parts of the encounter have been performed by me . I agree with the assessment, plan and orders as documented by the resident. Principal Problem:    UTI (urinary tract infection)  Active Problems:    Hypothyroidism    EDUARDO (acute kidney injury) ([address]    Fall    Laceration of head    Dementia ([address]    Hypertensive heart disease with diastolic heart failure ([address]  Resolved Problems:    * No resolved hospital problems. *         ---- ;     MD MARVIN Adamson [address], [address] [place] [address].    Phone [phone]   Fax: [phone]  Answering Service: [phone]

## 2022-01-07 NOTE — DISCHARGE INSTR - COC
Continuity of Care Form    Patient Name: Ayanna Patel   :  1930  MRN:  [id number]    Admit date:  2022  Discharge date:  22    Code Status Order: Full Code   Advance Directives:      Admitting Physician:  Justin Alatorre MD  PCP: Tony Joy MD    Discharging Nurse: [facility] Unit/Room#: 4872/0468-68  Discharging Unit Phone Number: [phone]    Emergency Contact:   Extended Emergency Contact Information  Primary Emergency Contact: John Paul Mccarty  Home Phone: [phone]  Relation: Child    Past Surgical History:  No past surgical history on file. Immunization History: There is no immunization history on file for this patient. Active Problems:  Patient Active Problem List   Diagnosis Code    Hypertensive emergency I16.1    Chest pain R07.9    Constipation K59.00    Hypothyroidism E03.9    Hypertensive urgency I16.0    Hypokalemia E87.6    Post herpetic neuralgia B02.29    EDUARDO (acute kidney injury) ([facility] N17.9    UTI (urinary tract infection) N39.0    Fall W19. XXXA    Laceration of head S01. 91XA    Dementia ([facility] F03.90    Hypertensive heart disease with diastolic heart failure (HCC) I11.0, I50.30       Isolation/Infection:   Isolation            No Isolation          Patient Infection Status       None to display            Nurse Assessment:  Last Vital Signs: /61   Pulse 75   Temp 97.5 °F (36.4 °C) (Oral)   Resp 19   SpO2 95%     Last documented pain score (0-10 scale): Pain Level: 0  Last Weight:   Wt Readings from Last 1 Encounters:   16 147 lb 11.2 oz (67 kg)     Mental Status:  disoriented    IV Access:  - None    Nursing Mobility/ADLs:  Walking   Assisted  Transfer  Assisted  Bathing  Assisted  Dressing  Assisted  Toileting  Independent  Feeding  [address]  Independent  Med Delivery   whole    Wound Care Documentation and Therapy:        Elimination:  Continence:    Bowel: Yes  Bladder: Yes  Urinary Catheter: None Colostomy/Ileostomy/Ileal Conduit: No       Date of Last BM: ***  No intake or output data in the 24 hours ending 01/07/22 0842  No intake/output data recorded. Safety Concerns: At Risk for Falls    Impairments/Disabilities:      None    Nutrition Therapy:  Current Nutrition Therapy:   - Oral Diet:  General    Routes of Feeding: Oral  Liquids: No Restrictions  Daily Fluid Restriction: no  Last Modified Barium Swallow with Video (Video Swallowing Test): not done    Treatments at the Time of Hospital Discharge:   Respiratory Treatments: ***  Oxygen Therapy:  is not on home oxygen therapy. Ventilator:    - No ventilator support    Rehab Therapies: Physical Therapy and Occupational Therapy  Weight Bearing Status/Restrictions: No weight bearing restirctions  Other Medical Equipment (for information only, NOT a DME order):  {EQUIPMENT:[id number]}  Other Treatments: ***    Patient's personal belongings (please select all that are sent with patient):  {[facility] DME Belongings:[id number]}    RN SIGNATURE:  Electronically signed by Nuha Sidhu RN on 1/7/22 at 1:53 PM EST    CASE MANAGEMENT/SOCIAL WORK SECTION    Inpatient Status Date: 1/7/22    Readmission Risk Assessment Score:  Readmission Risk              Risk of Unplanned Readmission:  19           Discharging to Facility/ . [address] [address] Details  FAX            [address] [address]       Phone: [phone]       Fax: [phone]          Dialysis Facility (if applicable)   Name:  Address:  Dialysis Schedule:  Phone:  Fax:    / signature: Electronically signed by Aime Eason RN on 1/7/22 at 1:28 PM EST    PHYSICIAN SECTION    Prognosis: Fair    Condition at Discharge: Stable    Rehab Potential (if transferring to Rehab):  Fair    Recommended Labs or Other Treatments After Discharge:     Physician Certification: I certify the above information and transfer of Leisa Mckenna  is necessary for the continuing treatment of the diagnosis listed and that she requires Home Care for greater 30 days.      Update Admission H&P: No change in H&P    PHYSICIAN SIGNATURE:  Electronically signed by Tanvi Cee MD on 1/7/22 at 12:23 PM EST

## 2022-01-07 NOTE — CARE COORDINATION
Spoke with Irwin Hernandez at [facility]  Pt is accepted and aware she will be discharged home today. Home care order faxed to [phone]. completed and signed Spoke with Filomena Borges at [facility] dme order for wheel chair, face to face and face sheet faxed to [phone].  Pt is to call [facility] [phone] family aware

## 2022-01-07 NOTE — PLAN OF CARE
Nayeli Tovar was evaluated today and a DME order was entered for a wheeled walker because she requires this to successfully complete daily living tasks of eating, bathing, toileting and personal cares. A wheeled walker is necessary due to the patient's unsteady gait, upper body weakness, and inability to  an ambulation device; and she can ambulate only by pushing a walker instead of a lesser assistive device such as a cane, crutch, or standard walker. The need for this equipment was discussed with the patient and she understands and is in agreement.       Electronically signed by Julieta Castañeda MD on 1/7/2022 at 9:45 AM

## 2022-01-07 NOTE — DISCHARGE SUMMARY
[address]     Department of Internal Medicine - Staff Internal Medicine Teaching Service    INPATIENT DISCHARGE SUMMARY      Patient Identification: Vernell Lozoya is a 81 y.o. female. :  1930  MRN: [id number]     Acct: [de-identified]   PCP: Johnathon Lynne MD  Admit Date:  2022  Discharge date and time: No discharge date for patient encounter. Attending Provider: Raheel Ambriz MD                                     [address] Problem Lists:  Principal Problem:    UTI (urinary tract infection)  Active Problems:    Hypothyroidism    EDUARDO (acute kidney injury) ([address]    Fall    Laceration of head    Dementia ([address]    Hypertensive heart disease with diastolic heart failure ([address]  Resolved Problems:    * No resolved hospital problems. *      HOSPITAL STAY     Brief Inpatient course: The patient is a pleasant 89 y. o. female with PMH of dementia, depression, anxiety, hypothyroidism presents with a chief complaint of a nonmechanical fall 3 days ago.  History obtained from son.  Patient does not understand English.  She fell back, hit her head and had laceration of occipital scalp.  Not evaluated at that time, unclear about loss of consciousness, no seizures like activity.  Report intermittent headaches and pelvic pain.  Family is concerned about facial droop, altered mental status, decreased responsiveness and unsteady gait. Dr. Michelle Fuentes /. denies any nausea, vomiting abdominal pain, chest, shortness of breath, urgency, diarrhea. Patient was ultimately diagnosed with UTI and she was treated with Ceftriaxone. Patient was ultimately managed for the following problems:  1. Non mechanical fall-more likely due to postural drop, EKG NSR, Trops 14, Pro-, Echo EF 65%. Check orthostatics. PT/OT working with the patient. 2. Urinary tract infection-Continue Rocephin. C/S pending. Discharged home on Augmentin for 8 days.  3. Acute kidney injury-probably due to dehydration. Lithium level normal and creatinine improved. Outpatient BMP   4. Hypothyroidism-continue levothyroxine 50 mcg daily ,TSH 1.91  5. Bipolar disorder: Continue lithium, lithium level less than 0.1  6. Anxiety/depression.  Continue home dose of Prozac 20. Symbyax 6-2      Procedures/ Significant Interventions:    CT BRAIN:  BRAIN/VENTRICLES: There is no acute intracranial hemorrhage, mass effect or   midline shift.  No abnormal extra-axial fluid collection.  The gray-white   differentiation is maintained without evidence of an acute infarct.  There is   no evidence of hydrocephalus. Involutional changes chronic microvascular   disease.  Vascular calcifications.       ORBITS: Cataract surgery.       SINUSES: Mild paranasal sinus mucosal thickening.  Degenerate changes TMJ   greatest on right.       SOFT TISSUES/SKULL:  No acute abnormality of the visualized skull or soft   tissues. CT Neck:  FINDINGS:   BONES/ALIGNMENT: There is no acute fracture or traumatic malalignment.       DEGENERATIVE CHANGES: Multilevel degenerate changes the cervical spine   identified most advanced C4 through C7.  Slight anterolisthesis C3 and C4-C4   C5 identified 1-2 mm.  Otherwise moderate multilevel facet arthropathy   identified greatest on the left.       SOFT TISSUES: There is no prevertebral soft tissue swelling.  Left mastoid   effusion.  No coalescence.  Air-fluid level identified within the upper   thoracic esophagus.       CT HIP    The pelvic and obturator rings are intact.  Pubic symphysis and sacroiliac   joints are congruent.       Evaluation of the right hip shows no stress, insufficiency, or traumatic   fracture.  The appearance on the radiograph is secondary to ring osteophyte   formation.  Overall, there are moderate degenerative changes.    Chondrocalcinosis is noted.  No joint effusion is found.       No stress, insufficiency, or traumatic fractures are detected within the left   hip.  No joint effusion is seen.       Regional muscles are symmetric bilaterally, with maintenance of intramuscular   fat planes.       Uterus and adnexa regions unremarkable.  Bubble of gas is noted within   urinary bladder.  Urinary bladder otherwise unremarkable.  No free pelvic   fluid is found.  Extensive diverticulosis is seen within the visualized large   bowel, especially in the sigmoid region.  No CT evidence of diverticulitis. ECHO:  Left ventricle is normal in size, global left ventricular systolic function  is hyperdynamic, estimated ejection fraction is > 65%. Near complete collapse of LV during systole with minimal mid LV gradients. Borderline increased left ventricular wall thickness. Normal Calculated global L. strain of -17.1 %. Evidence of mild (grade I) diastolic dysfunction. Aortic valve is sclerotic but opens well. Mitral valve sclerosis with stenosis. Mild mitral stenosis. Mean gradient is 4mmHg . Mild mitral regurgitation.     Consults:     Consults:     Final Specialist Recommendations/Findings:   IP CONSULT TO INTERNAL MEDICINE  IP CONSULT TO CASE MANAGEMENT  IP CONSULT TO HOME CARE NEEDS      Any Hospital Acquired Infections: none    Discharge Functional Status:  stable    DISCHARGE PLAN     Disposition: home    Patient Instructions:   Current Discharge Medication List      START taking these medications    Details   amoxicillin-clavulanate (AUGMENTIN) 875-125 MG per tablet Take 1 tablet by mouth 2 times daily for 8 days  Qty: 16 tablet, Refills: 0         CONTINUE these medications which have NOT CHANGED    Details   fesoterodine 4 MG TB24 Take 1 tablet by mouth daily      Brexpiprazole 0.5 MG TABS Take 1 tablet by mouth daily      ranitidine (ZANTAC) 150 MG tablet Take 150 mg by mouth 2 times daily      fexofenadine (ALLEGRA) 180 MG tablet Take 180 mg by mouth daily      pregabalin (LYRICA) 25 MG capsule May increase weekly by 1 pill until comfortable or using 2 pills tid  Qty: 60 capsule, Refills: 1    Associated Diagnoses: Post herpetic neuralgia      donepezil (ARICEPT) 10 MG tablet Take 10 mg by mouth nightly      gabapentin (NEURONTIN) 100 MG capsule Take 100 mg by mouth 2 times daily      lithium (LITHOBID) 300 MG CR tablet 1/4 of a tablet daily      Cholecalciferol (VITAMIN D3) 1000 UNITS CAPS Take 2 capsules by mouth daily      traMADol (ULTRAM) 50 MG tablet Take 50 mg by mouth 3 times daily as needed for Pain      nitroGLYCERIN (NITROSTAT) 0.3 MG SL tablet Place 1 tablet under the tongue every 5 minutes as needed for Chest pain  Qty: 30 tablet, Refills: 3      FLUoxetine (PROZAC) 20 MG capsule Take 20 mg by mouth daily      levothyroxine (SYNTHROID) 50 MCG tablet Take 50 mcg by mouth Daily      !! ALPRAZolam (XANAX) 0.5 MG tablet Take 0.5 mg by mouth nightly as needed for Sleep (0.5 to 1 mg at night for sleep prn)       !! ALPRAZolam (XANAX) 0.5 MG tablet Take 0.5 mg by mouth 2 times daily Take once in the morning and again in the afternoon      OLANZapine-FLUoxetine (SYMBYAX) 6-25 MG CAPS capsule Take 1 capsule by mouth nightly       !! - Potential duplicate medications found. Please discuss with provider. Activity: activity as tolerated    Diet: regular diet    Follow-up:    [facility]  [phone]    Schedule an appointment as soon as possible for a visit in 1 week        Patient Instructions: 1. Please take the augment 875-125 mg one tablet two times daily for 8 days  2. Please follow up with the PCP in 1 week time  3. Please do repeat BMP tests in 4 days and 1 week to check on your kidney functions  4. Please return to us/call back if there is any concern.   Follow up labs: BMP  Follow up imaging:     Note that over 30 minutes was spent in preparing discharge papers, discussing discharge with patient, medication review, etc.      Rea Granger MD, MD  Internal Medicine Resident, PGY-2  [address] [facility] [address];  [place], [place]  1/7/2022, 2:50 PM

## 2022-01-07 NOTE — DISCHARGE INSTR - DIET

## 2022-01-08 LAB
CULTURE: ABNORMAL
CULTURE: ABNORMAL
Lab: ABNORMAL
SPECIMEN DESCRIPTION: ABNORMAL

## 2022-01-08 NOTE — PROGRESS NOTES
CLINICAL PHARMACY NOTE: MEDS TO BEDS    Total # of Prescriptions Filled: 1   The following medications were delivered to the patient:  · augmentin [phone]    Additional Documentation:    Paid with card.
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
## 2022-02-05 PROBLEM — N39.0 UTI (URINARY TRACT INFECTION): Status: RESOLVED | Noted: 2022-01-06 | Resolved: 2022-02-05

## 2023-11-28 ENCOUNTER — OFFICE VISIT (OUTPATIENT)
Age: 89
End: 2023-11-28
Payer: MEDICARE

## 2023-11-28 VITALS
HEART RATE: 75 BPM | WEIGHT: 143 LBS | SYSTOLIC BLOOD PRESSURE: 126 MMHG | DIASTOLIC BLOOD PRESSURE: 62 MMHG | OXYGEN SATURATION: 98 % | HEIGHT: 59 IN | TEMPERATURE: 97.7 F | BODY MASS INDEX: 28.83 KG/M2

## 2023-11-28 DIAGNOSIS — N39.46 MIXED STRESS AND URGE URINARY INCONTINENCE: ICD-10-CM

## 2023-11-28 DIAGNOSIS — I11.0 HYPERTENSIVE HEART DISEASE WITH HEART FAILURE (HCC): ICD-10-CM

## 2023-11-28 DIAGNOSIS — I10 ESSENTIAL HYPERTENSION: Primary | ICD-10-CM

## 2023-11-28 DIAGNOSIS — F03.B0 MODERATE DEMENTIA WITHOUT BEHAVIORAL DISTURBANCE, PSYCHOTIC DISTURBANCE, MOOD DISTURBANCE, OR ANXIETY, UNSPECIFIED DEMENTIA TYPE (HCC): ICD-10-CM

## 2023-11-28 DIAGNOSIS — F31.9 BIPOLAR 1 DISORDER (HCC): ICD-10-CM

## 2023-11-28 DIAGNOSIS — F41.9 ANXIETY: ICD-10-CM

## 2023-11-28 DIAGNOSIS — R60.0 LOCALIZED EDEMA: ICD-10-CM

## 2023-11-28 DIAGNOSIS — G30.1 ALZHEIMER'S DISEASE WITH LATE ONSET (CODE) (HCC): ICD-10-CM

## 2023-11-28 DIAGNOSIS — Z91.81 AT HIGH RISK FOR FALLS: ICD-10-CM

## 2023-11-28 DIAGNOSIS — E03.9 ACQUIRED HYPOTHYROIDISM: ICD-10-CM

## 2023-11-28 DIAGNOSIS — M51.36 DISC DEGENERATION, LUMBAR: ICD-10-CM

## 2023-11-28 DIAGNOSIS — I50.42 CHRONIC COMBINED SYSTOLIC AND DIASTOLIC CONGESTIVE HEART FAILURE (HCC): ICD-10-CM

## 2023-11-28 DIAGNOSIS — Z23 IMMUNIZATION DUE: ICD-10-CM

## 2023-11-28 LAB
BILIRUBIN, POC: NORMAL
BLOOD URINE, POC: NORMAL
CLARITY, POC: CLEAR
COLOR, POC: YELLOW
GLUCOSE URINE, POC: NORMAL
KETONES, POC: NORMAL
LEUKOCYTE EST, POC: NORMAL
NITRITE, POC: NORMAL
PH, POC: 5
PROTEIN, POC: NORMAL
SPECIFIC GRAVITY, POC: 1.01
UROBILINOGEN, POC: NORMAL

## 2023-11-28 PROCEDURE — 1123F ACP DISCUSS/DSCN MKR DOCD: CPT | Performed by: INTERNAL MEDICINE

## 2023-11-28 PROCEDURE — 1036F TOBACCO NON-USER: CPT | Performed by: INTERNAL MEDICINE

## 2023-11-28 PROCEDURE — 99214 OFFICE O/P EST MOD 30 MIN: CPT | Performed by: INTERNAL MEDICINE

## 2023-11-28 PROCEDURE — 0509F URINE INCON PLAN DOCD: CPT | Performed by: INTERNAL MEDICINE

## 2023-11-28 PROCEDURE — G8484 FLU IMMUNIZE NO ADMIN: HCPCS | Performed by: INTERNAL MEDICINE

## 2023-11-28 PROCEDURE — G8427 DOCREV CUR MEDS BY ELIG CLIN: HCPCS | Performed by: INTERNAL MEDICINE

## 2023-11-28 PROCEDURE — G8419 CALC BMI OUT NRM PARAM NOF/U: HCPCS | Performed by: INTERNAL MEDICINE

## 2023-11-28 PROCEDURE — 1090F PRES/ABSN URINE INCON ASSESS: CPT | Performed by: INTERNAL MEDICINE

## 2023-11-28 PROCEDURE — 81002 URINALYSIS NONAUTO W/O SCOPE: CPT | Performed by: INTERNAL MEDICINE

## 2023-11-28 RX ORDER — FUROSEMIDE 40 MG/1
1 TABLET ORAL DAILY
COMMUNITY
Start: 2023-09-03

## 2023-11-28 RX ORDER — LISINOPRIL 5 MG/1
5 TABLET ORAL DAILY
COMMUNITY

## 2023-11-28 SDOH — ECONOMIC STABILITY: FOOD INSECURITY: WITHIN THE PAST 12 MONTHS, YOU WORRIED THAT YOUR FOOD WOULD RUN OUT BEFORE YOU GOT MONEY TO BUY MORE.: NEVER TRUE

## 2023-11-28 SDOH — ECONOMIC STABILITY: INCOME INSECURITY: HOW HARD IS IT FOR YOU TO PAY FOR THE VERY BASICS LIKE FOOD, HOUSING, MEDICAL CARE, AND HEATING?: NOT VERY HARD

## 2023-11-28 SDOH — ECONOMIC STABILITY: HOUSING INSECURITY
IN THE LAST 12 MONTHS, WAS THERE A TIME WHEN YOU DID NOT HAVE A STEADY PLACE TO SLEEP OR SLEPT IN A SHELTER (INCLUDING NOW)?: NO

## 2023-11-28 SDOH — ECONOMIC STABILITY: FOOD INSECURITY: WITHIN THE PAST 12 MONTHS, THE FOOD YOU BOUGHT JUST DIDN'T LAST AND YOU DIDN'T HAVE MONEY TO GET MORE.: NEVER TRUE

## 2023-11-28 ASSESSMENT — PATIENT HEALTH QUESTIONNAIRE - PHQ9
1. LITTLE INTEREST OR PLEASURE IN DOING THINGS: 0
SUM OF ALL RESPONSES TO PHQ QUESTIONS 1-9: 1
2. FEELING DOWN, DEPRESSED OR HOPELESS: 1
SUM OF ALL RESPONSES TO PHQ QUESTIONS 1-9: 1
SUM OF ALL RESPONSES TO PHQ9 QUESTIONS 1 & 2: 1
SUM OF ALL RESPONSES TO PHQ QUESTIONS 1-9: 1
SUM OF ALL RESPONSES TO PHQ QUESTIONS 1-9: 1

## 2023-11-28 ASSESSMENT — ENCOUNTER SYMPTOMS
SINUS PAIN: 0
ABDOMINAL PAIN: 0
VOMITING: 0
SINUS PRESSURE: 0
EYE REDNESS: 0
SORE THROAT: 0
RHINORRHEA: 0
DIARRHEA: 0
SHORTNESS OF BREATH: 0
EYE PAIN: 0
NAUSEA: 0
BACK PAIN: 0
BLOOD IN STOOL: 0
CONSTIPATION: 0
WHEEZING: 0
COUGH: 0

## 2023-11-28 NOTE — PROGRESS NOTES
[facility]     Date of Visit:  2023  Patient Name: Lyndon Solis   Patient :  [id number]     CHIEF COMPLAINT:     Lyndon Solis is a 81 y.o. female who presents today for an general visit to be evaluated for the following condition(s):  Chief Complaint   Patient presents with    3 Month Follow-Up       HISTORY OF PRESENT ILLNESS       Xanax decreased from 0.5 to 0.25 mg tid. More alert. Some depression but no manic episode. REVIEW OF SYSTEMS     Review of Systems   Constitutional:  Negative for chills, fever and unexpected weight change. HENT:  Negative for congestion, ear pain, hearing loss, rhinorrhea, sinus pressure, sinus pain, sneezing and sore throat. Eyes:  Negative for pain, redness and visual disturbance. Respiratory:  Negative for cough, shortness of breath and wheezing. Cardiovascular:  Negative for chest pain, palpitations and leg swelling. Gastrointestinal:  Negative for abdominal pain, blood in stool, constipation, diarrhea, nausea and vomiting. Endocrine: Negative for cold intolerance and heat intolerance. Genitourinary:  Negative for difficulty urinating, dysuria, frequency, hematuria and urgency. Musculoskeletal:  Negative for arthralgias, back pain, gait problem, joint swelling, myalgias and neck pain. Skin:  Negative for rash and wound. Allergic/Immunologic: Negative for immunocompromised state. Neurological:  Negative for dizziness, tremors, seizures, syncope, speech difficulty, weakness, light-headedness, numbness and headaches. Psychiatric/Behavioral:  Negative for confusion, hallucinations and sleep disturbance. The patient is not nervous/anxious.          REVIEWED INFORMATION      Current Outpatient Medications   Medication Sig Dispense Refill    furosemide (LASIX) 40 MG tablet Take 1 tablet by mouth daily      lisinopril (PRINIVIL;ZESTRIL) 5 MG tablet Take 1 tablet by mouth daily      fesoterodine 4 MG TB24 Take 1 tablet by mouth daily

## 2024-01-02 RX ORDER — SOLIFENACIN SUCCINATE 5 MG/1
TABLET, FILM COATED ORAL
Qty: 90 TABLET | Refills: 0 | Status: SHIPPED | OUTPATIENT
Start: 2024-01-02

## 2024-01-02 NOTE — TELEPHONE ENCOUNTER
Anahi Mccarty is calling to request a refill on the following medication(s):    Last Visit Date (If Applicable):  11/28/2023    Next Visit Date:    6/4/2024    Medication Request:  Requested Prescriptions     Pending Prescriptions Disp Refills    solifenacin (VESICARE) 5 MG tablet [Pharmacy Med Name: SOLIFENACIN 5 MG TABLET] 90 tablet 0     Sig: TAKE ONE TABLET BY MOUTH DAILY FOR 90 DAYS.

## 2024-01-03 RX ORDER — LISINOPRIL 5 MG/1
5 TABLET ORAL DAILY
Qty: 90 TABLET | Refills: 0 | Status: SHIPPED | OUTPATIENT
Start: 2024-01-03

## 2024-01-03 NOTE — TELEPHONE ENCOUNTER
Anahi Mccarty is calling to request a refill on the following medication(s):    Last Visit Date (If Applicable):  11/28/2023    Next Visit Date:    6/4/2024    Medication Request:  Requested Prescriptions     Pending Prescriptions Disp Refills    lisinopril (PRINIVIL;ZESTRIL) 5 MG tablet [Pharmacy Med Name: LISINOPRIL 5 MG TABLET] 90 tablet 0     Sig: TAKE ONE TABLET BY MOUTH DAILY

## 2024-04-01 ENCOUNTER — TELEPHONE (OUTPATIENT)
Age: 89
End: 2024-04-01

## 2024-04-01 NOTE — TELEPHONE ENCOUNTER
Patient needs a refill of the following two medications:  Solifenacin 5 MG   Takes once a day   #90  Lisinopril    MG       Takes once a day   #90    Please send to [facility].

## 2024-04-01 NOTE — TELEPHONE ENCOUNTER
Anahi cMcarty is calling to request a refill on the following medication(s):    Last Visit Date (If Applicable):  11/28/2023    Next Visit Date:    6/4/2024    Medication Request:  Requested Prescriptions     Pending Prescriptions Disp Refills    solifenacin (VESICARE) 5 MG tablet 90 tablet 0    lisinopril (PRINIVIL;ZESTRIL) 5 MG tablet 90 tablet 3     Sig: Take 1 tablet by mouth daily

## 2024-04-05 RX ORDER — LISINOPRIL 5 MG/1
5 TABLET ORAL DAILY
Qty: 90 TABLET | Refills: 3 | Status: SHIPPED | OUTPATIENT
Start: 2024-04-05

## 2024-04-05 RX ORDER — SOLIFENACIN SUCCINATE 5 MG/1
5 TABLET, FILM COATED ORAL DAILY
Qty: 90 TABLET | Refills: 3 | Status: SHIPPED | OUTPATIENT
Start: 2024-04-05

## 2024-05-06 DIAGNOSIS — E03.4 HYPOTHYROIDISM DUE TO ACQUIRED ATROPHY OF THYROID: Primary | ICD-10-CM

## 2024-05-06 NOTE — TELEPHONE ENCOUNTER
Anahi Mccarty is calling to request a refill on the following medication(s):    Last Visit Date (If Applicable):  11/28/2023    Next Visit Date:    6/4/2024    Medication Request:  Requested Prescriptions     Pending Prescriptions Disp Refills    levothyroxine (SYNTHROID) 50 MCG tablet [Pharmacy Med Name: LEVOTHYROXINE 50 MCG TABLET] 90 tablet      Sig: TAKE ONE TABLET BY MOUTH DAILY

## 2024-05-09 RX ORDER — LEVOTHYROXINE SODIUM 0.05 MG/1
50 TABLET ORAL DAILY
Qty: 90 TABLET | Refills: 3 | Status: SHIPPED | OUTPATIENT
Start: 2024-05-09

## 2024-06-04 ENCOUNTER — OFFICE VISIT (OUTPATIENT)
Age: 89
End: 2024-06-04
Payer: MEDICARE

## 2024-06-04 VITALS
HEIGHT: 59 IN | HEART RATE: 62 BPM | WEIGHT: 144 LBS | BODY MASS INDEX: 29.03 KG/M2 | TEMPERATURE: 96.9 F | SYSTOLIC BLOOD PRESSURE: 120 MMHG | DIASTOLIC BLOOD PRESSURE: 62 MMHG | OXYGEN SATURATION: 99 %

## 2024-06-04 DIAGNOSIS — I10 ESSENTIAL HYPERTENSION: Primary | ICD-10-CM

## 2024-06-04 DIAGNOSIS — M51.36 DISC DEGENERATION, LUMBAR: ICD-10-CM

## 2024-06-04 DIAGNOSIS — N39.46 MIXED STRESS AND URGE URINARY INCONTINENCE: ICD-10-CM

## 2024-06-04 DIAGNOSIS — Z13.220 LIPID SCREENING: ICD-10-CM

## 2024-06-04 DIAGNOSIS — F03.B0 MODERATE DEMENTIA WITHOUT BEHAVIORAL DISTURBANCE, PSYCHOTIC DISTURBANCE, MOOD DISTURBANCE, OR ANXIETY, UNSPECIFIED DEMENTIA TYPE (HCC): ICD-10-CM

## 2024-06-04 DIAGNOSIS — B02.29 POSTHERPETIC NEURALGIA: ICD-10-CM

## 2024-06-04 DIAGNOSIS — E03.9 ACQUIRED HYPOTHYROIDISM: ICD-10-CM

## 2024-06-04 DIAGNOSIS — G30.1 ALZHEIMER'S DISEASE WITH LATE ONSET (CODE) (HCC): ICD-10-CM

## 2024-06-04 DIAGNOSIS — F41.9 ANXIETY: ICD-10-CM

## 2024-06-04 DIAGNOSIS — Z91.81 AT HIGH RISK FOR FALLS: ICD-10-CM

## 2024-06-04 DIAGNOSIS — I50.42 CHRONIC COMBINED SYSTOLIC AND DIASTOLIC CONGESTIVE HEART FAILURE (HCC): ICD-10-CM

## 2024-06-04 DIAGNOSIS — I11.0 HYPERTENSIVE HEART DISEASE WITH HEART FAILURE (HCC): ICD-10-CM

## 2024-06-04 DIAGNOSIS — R60.0 LOCALIZED EDEMA: ICD-10-CM

## 2024-06-04 DIAGNOSIS — F31.9 BIPOLAR 1 DISORDER (HCC): ICD-10-CM

## 2024-06-04 PROBLEM — M51.369 DISC DEGENERATION, LUMBAR: Status: ACTIVE | Noted: 2024-06-04

## 2024-06-04 PROCEDURE — 1090F PRES/ABSN URINE INCON ASSESS: CPT | Performed by: INTERNAL MEDICINE

## 2024-06-04 PROCEDURE — 0509F URINE INCON PLAN DOCD: CPT | Performed by: INTERNAL MEDICINE

## 2024-06-04 PROCEDURE — G8427 DOCREV CUR MEDS BY ELIG CLIN: HCPCS | Performed by: INTERNAL MEDICINE

## 2024-06-04 PROCEDURE — 99214 OFFICE O/P EST MOD 30 MIN: CPT | Performed by: INTERNAL MEDICINE

## 2024-06-04 PROCEDURE — 1123F ACP DISCUSS/DSCN MKR DOCD: CPT | Performed by: INTERNAL MEDICINE

## 2024-06-04 PROCEDURE — G8419 CALC BMI OUT NRM PARAM NOF/U: HCPCS | Performed by: INTERNAL MEDICINE

## 2024-06-04 PROCEDURE — 1036F TOBACCO NON-USER: CPT | Performed by: INTERNAL MEDICINE

## 2024-06-04 ASSESSMENT — PATIENT HEALTH QUESTIONNAIRE - PHQ9
4. FEELING TIRED OR HAVING LITTLE ENERGY: SEVERAL DAYS
2. FEELING DOWN, DEPRESSED OR HOPELESS: MORE THAN HALF THE DAYS
SUM OF ALL RESPONSES TO PHQ QUESTIONS 1-9: 8
9. THOUGHTS THAT YOU WOULD BE BETTER OFF DEAD, OR OF HURTING YOURSELF: NOT AT ALL
SUM OF ALL RESPONSES TO PHQ QUESTIONS 1-9: 8
6. FEELING BAD ABOUT YOURSELF - OR THAT YOU ARE A FAILURE OR HAVE LET YOURSELF OR YOUR FAMILY DOWN: NOT AT ALL
3. TROUBLE FALLING OR STAYING ASLEEP: SEVERAL DAYS
SUM OF ALL RESPONSES TO PHQ QUESTIONS 1-9: 8
10. IF YOU CHECKED OFF ANY PROBLEMS, HOW DIFFICULT HAVE THESE PROBLEMS MADE IT FOR YOU TO DO YOUR WORK, TAKE CARE OF THINGS AT HOME, OR GET ALONG WITH OTHER PEOPLE: SOMEWHAT DIFFICULT
5. POOR APPETITE OR OVEREATING: SEVERAL DAYS
SUM OF ALL RESPONSES TO PHQ QUESTIONS 1-9: 8
7. TROUBLE CONCENTRATING ON THINGS, SUCH AS READING THE NEWSPAPER OR WATCHING TELEVISION: SEVERAL DAYS
8. MOVING OR SPEAKING SO SLOWLY THAT OTHER PEOPLE COULD HAVE NOTICED. OR THE OPPOSITE, BEING SO FIGETY OR RESTLESS THAT YOU HAVE BEEN MOVING AROUND A LOT MORE THAN USUAL: MORE THAN HALF THE DAYS

## 2024-06-04 NOTE — PROGRESS NOTES
Essential hypertension  Comments:  Blood pressure is stable.  Lisinopril 5 mg daily and Lasix 40 mg daily.  Check CMP, CBC, TSH.  2. At high risk for falls  Comments:  Using a rolling walker.  Quite active per daughter.  Staying at the memory care center.  Daughter brings her out 3 times per wk  3. Alzheimer's disease with late onset (CODE) (Formerly [facility])  Comments:  Overall stable on donepezil 10 mg daily.  4. Hypertensive heart disease with heart failure (Formerly [facility])  Comments:  BP stable.  No chest pain.  Lasix 40 mg daily, lisinopril 5 mg daily.  CMP CBC  5. Acquired hypothyroidism  Comments:  TSH.  Levothyroxine 50 mcg p.o. daily  6. Bipolar 1 disorder (Formerly [facility])  Comments:  Lithium carbonate  mg 1/4 tablet daily and olanzapine/fluoxetine 6/25 at that time.  Prozac 20 mg daily.  Follows up with Dr. Higgins.  Lithium level  7. Moderate dementia without behavioral disturbance, psychotic disturbance, mood disturbance, or anxiety, unspecified dementia type (Formerly [facility])  Comments:  Donepezil 10 mg daily, Prozac 20 mg daily, olanzapine/fluoxetine 6/25 at bedtime  8. Anxiety  Comments:  Xanax at 0.25 mg twice daily.  Fluoxetine 20 mg daily  9. Chronic combined systolic and diastolic congestive heart failure (Formerly [facility])  Comments:  Lasix 40 mg daily with lisinopril 5 mg daily.  Blood pressure stable.  10. Disc degeneration, lumbar  Comments:  No recent falls.  Using rolling walker.  Tylenol 2 tablets in the morning and 2 at bedtime to help with residual shingles pain left arm  11. Mixed stress and urge urinary incontinence  Comments:  Using depends as well as generic Vesicare 4 mg daily.  Increase water intake  12. Localized edema  Comments:  Lasix 40 mg daily.  CMP  13. Postherpetic neuralgia  Comments:  Cont discomfort L arm. shingles 2020.  Intolerant to gabapentin it made her too dizzy.  Not better with creams or CBD oil. Ok to try CBD or Cannabis edibles.  14. Lipid screening       No follow-ups on file.    COMMUNICATION:       Electronically

## 2024-08-30 DIAGNOSIS — I50.42 CHRONIC COMBINED SYSTOLIC AND DIASTOLIC CONGESTIVE HEART FAILURE (HCC): Primary | ICD-10-CM

## 2024-08-30 NOTE — TELEPHONE ENCOUNTER
[facility] is requesting a refill for furosemide 40mg daily. Last prescriber is historical.   Last visit 6/4/2024  Next visit 11/13/2024

## 2024-08-31 RX ORDER — FUROSEMIDE 40 MG
40 TABLET ORAL DAILY
Qty: 90 TABLET | Refills: 3 | Status: SHIPPED | OUTPATIENT
Start: 2024-08-31

## 2024-09-09 ENCOUNTER — TELEPHONE (OUTPATIENT)
Age: 89
End: 2024-09-09

## 2024-12-18 ENCOUNTER — APPOINTMENT (OUTPATIENT)
Dept: GENERAL RADIOLOGY | Age: 89
DRG: 683 | End: 2024-12-18
Payer: MEDICARE

## 2024-12-18 ENCOUNTER — APPOINTMENT (OUTPATIENT)
Dept: ULTRASOUND IMAGING | Age: 89
DRG: 683 | End: 2024-12-18
Payer: MEDICARE

## 2024-12-18 ENCOUNTER — HOSPITAL ENCOUNTER (INPATIENT)
Age: 89
LOS: 2 days | Discharge: HOME HEALTH | DRG: 683 | End: 2024-12-20
Attending: EMERGENCY MEDICINE | Admitting: PSYCHIATRY & NEUROLOGY
Payer: MEDICARE

## 2024-12-18 ENCOUNTER — APPOINTMENT (OUTPATIENT)
Dept: CT IMAGING | Age: 89
DRG: 683 | End: 2024-12-18
Payer: MEDICARE
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
## 2024-12-18 DIAGNOSIS — E86.0 DEHYDRATION: ICD-10-CM

## 2024-12-18 PROBLEM — S09.90XA HEAD TRAUMA: Status: ACTIVE | Noted: 2024-12-18

## 2024-12-18 PROBLEM — R79.89 ELEVATED SERUM CREATININE: Status: ACTIVE | Noted: 2024-12-18

## 2024-12-18 PROBLEM — R74.8 ELEVATED CREATINE KINASE: Status: ACTIVE | Noted: 2024-12-18

## 2024-12-18 PROBLEM — D64.9 ANEMIA: Status: ACTIVE | Noted: 2024-12-18

## 2024-12-18 PROBLEM — R29.6 MULTIPLE FALLS: Status: ACTIVE | Noted: 2024-12-18

## 2024-12-18 LAB
ANION GAP SERPL CALCULATED.3IONS-SCNC: 11 MMOL/L (ref 9–16)
BASOPHILS # BLD: 0.04 K/UL (ref 0–0.2)
BASOPHILS NFR BLD: 1 % (ref 0–2)
BILIRUB UR QL STRIP: NEGATIVE
BNP SERPL-MCNC: 371 PG/ML (ref 0–450)
BUN SERPL-MCNC: 44 MG/DL (ref 8–23)
CALCIUM SERPL-MCNC: 8.5 MG/DL (ref 8.6–10.4)
CHLORIDE SERPL-SCNC: 104 MMOL/L (ref 98–107)
CLARITY UR: CLEAR
CO2 SERPL-SCNC: 25 MMOL/L (ref 20–31)
COLOR UR: YELLOW
COMMENT: NORMAL
CREAT SERPL-MCNC: 2.2 MG/DL (ref 0.6–0.9)
EOSINOPHIL # BLD: 0.12 K/UL (ref 0–0.44)
EOSINOPHILS RELATIVE PERCENT: 2 % (ref 1–4)
ERYTHROCYTE [DISTWIDTH] IN BLOOD BY AUTOMATED COUNT: 14.5 % (ref 11.8–14.4)
GFR, ESTIMATED: 20 ML/MIN/1.73M2
GLUCOSE SERPL-MCNC: 91 MG/DL (ref 74–99)
GLUCOSE UR STRIP-MCNC: NEGATIVE MG/DL
HCT VFR BLD AUTO: 27.6 % (ref 36.3–47.1)
HGB BLD-MCNC: 8.3 G/DL (ref 11.9–15.1)
HGB UR QL STRIP.AUTO: NEGATIVE
IMM GRANULOCYTES # BLD AUTO: <0.03 K/UL (ref 0–0.3)
IMM GRANULOCYTES NFR BLD: 0 %
KETONES UR STRIP-MCNC: NEGATIVE MG/DL
LEUKOCYTE ESTERASE UR QL STRIP: NEGATIVE
LITHIUM LEVEL: 0.4 MMOL/L (ref 0.6–1.2)
LYMPHOCYTES NFR BLD: 1.23 K/UL (ref 1.1–3.7)
LYMPHOCYTES RELATIVE PERCENT: 15 % (ref 24–43)
MCH RBC QN AUTO: 24.7 PG (ref 25.2–33.5)
MCHC RBC AUTO-ENTMCNC: 30.1 G/DL (ref 28.4–34.8)
MCV RBC AUTO: 82.1 FL (ref 82.6–102.9)
MONOCYTES NFR BLD: 0.57 K/UL (ref 0.1–1.2)
MONOCYTES NFR BLD: 7 % (ref 3–12)
NEUTROPHILS NFR BLD: 75 % (ref 36–65)
NEUTS SEG NFR BLD: 6.12 K/UL (ref 1.5–8.1)
NITRITE UR QL STRIP: NEGATIVE
NRBC BLD-RTO: 0 PER 100 WBC
PH UR STRIP: 7.5 [PH] (ref 5–8)
PLATELET # BLD AUTO: 251 K/UL (ref 138–453)
PMV BLD AUTO: 9.7 FL (ref 8.1–13.5)
POTASSIUM SERPL-SCNC: 4.7 MMOL/L (ref 3.7–5.3)
PROT UR STRIP-MCNC: NEGATIVE MG/DL
RBC # BLD AUTO: 3.36 M/UL (ref 3.95–5.11)
RBC # BLD: ABNORMAL 10*6/UL
SODIUM SERPL-SCNC: 140 MMOL/L (ref 136–145)
SP GR UR STRIP: 1.02 (ref 1–1.03)
T4 FREE SERPL-MCNC: 1.4 NG/DL (ref 0.9–1.7)
TROPONIN I SERPL HS-MCNC: 24 NG/L (ref 0–14)
TROPONIN I SERPL HS-MCNC: 28 NG/L (ref 0–14)
TROPONIN I SERPL HS-MCNC: 29 NG/L (ref 0–14)
TSH SERPL DL<=0.05 MIU/L-ACNC: 4.86 UIU/ML (ref 0.27–4.2)
UROBILINOGEN UR STRIP-ACNC: NORMAL EU/DL (ref 0–1)
WBC OTHER # BLD: 8.1 K/UL (ref 3.5–11.3)

## 2024-12-18 PROCEDURE — 84443 ASSAY THYROID STIM HORMONE: CPT

## 2024-12-18 PROCEDURE — 93005 ELECTROCARDIOGRAM TRACING: CPT

## 2024-12-18 PROCEDURE — 99285 EMERGENCY DEPT VISIT HI MDM: CPT

## 2024-12-18 PROCEDURE — 82570 ASSAY OF URINE CREATININE: CPT

## 2024-12-18 PROCEDURE — 81003 URINALYSIS AUTO W/O SCOPE: CPT

## 2024-12-18 PROCEDURE — 99222 1ST HOSP IP/OBS MODERATE 55: CPT | Performed by: PSYCHIATRY & NEUROLOGY

## 2024-12-18 PROCEDURE — 80048 BASIC METABOLIC PNL TOTAL CA: CPT

## 2024-12-18 PROCEDURE — 36415 COLL VENOUS BLD VENIPUNCTURE: CPT

## 2024-12-18 PROCEDURE — 12001 RPR S/N/AX/GEN/TRNK 2.5CM/<: CPT

## 2024-12-18 PROCEDURE — 85025 COMPLETE CBC W/AUTO DIFF WBC: CPT

## 2024-12-18 PROCEDURE — 84300 ASSAY OF URINE SODIUM: CPT

## 2024-12-18 PROCEDURE — 84439 ASSAY OF FREE THYROXINE: CPT

## 2024-12-18 PROCEDURE — 76775 US EXAM ABDO BACK WALL LIM: CPT

## 2024-12-18 PROCEDURE — 71046 X-RAY EXAM CHEST 2 VIEWS: CPT

## 2024-12-18 PROCEDURE — 2060000000 HC ICU INTERMEDIATE R&B

## 2024-12-18 PROCEDURE — 80178 ASSAY OF LITHIUM: CPT

## 2024-12-18 PROCEDURE — 84484 ASSAY OF TROPONIN QUANT: CPT

## 2024-12-18 PROCEDURE — 4A10X4Z MONITORING OF CENTRAL NERVOUS ELECTRICAL ACTIVITY, EXTERNAL APPROACH: ICD-10-PCS | Performed by: PSYCHIATRY & NEUROLOGY

## 2024-12-18 PROCEDURE — 83880 ASSAY OF NATRIURETIC PEPTIDE: CPT

## 2024-12-18 PROCEDURE — 2580000003 HC RX 258

## 2024-12-18 PROCEDURE — 70498 CT ANGIOGRAPHY NECK: CPT

## 2024-12-18 PROCEDURE — 72125 CT NECK SPINE W/O DYE: CPT

## 2024-12-18 PROCEDURE — 70450 CT HEAD/BRAIN W/O DYE: CPT

## 2024-12-18 PROCEDURE — 84540 ASSAY OF URINE/UREA-N: CPT

## 2024-12-18 PROCEDURE — 6360000004 HC RX CONTRAST MEDICATION

## 2024-12-18 RX ORDER — HEPARIN SODIUM 5000 [USP'U]/ML
5000 INJECTION, SOLUTION INTRAVENOUS; SUBCUTANEOUS EVERY 8 HOURS SCHEDULED
Status: DISCONTINUED | OUTPATIENT
Start: 2024-12-18 | End: 2024-12-20 | Stop reason: HOSPADM

## 2024-12-18 RX ORDER — FUROSEMIDE 40 MG/1
40 TABLET ORAL DAILY
Status: DISCONTINUED | OUTPATIENT
Start: 2024-12-19 | End: 2024-12-20 | Stop reason: HOSPADM

## 2024-12-18 RX ORDER — SODIUM CHLORIDE 9 MG/ML
INJECTION, SOLUTION INTRAVENOUS CONTINUOUS
Status: DISCONTINUED | OUTPATIENT
Start: 2024-12-18 | End: 2024-12-20 | Stop reason: HOSPADM

## 2024-12-18 RX ORDER — DONEPEZIL HYDROCHLORIDE 10 MG/1
10 TABLET, FILM COATED ORAL NIGHTLY
Status: DISCONTINUED | OUTPATIENT
Start: 2024-12-18 | End: 2024-12-20 | Stop reason: HOSPADM

## 2024-12-18 RX ORDER — LEVOTHYROXINE SODIUM 50 UG/1
50 TABLET ORAL DAILY
Status: DISCONTINUED | OUTPATIENT
Start: 2024-12-19 | End: 2024-12-20 | Stop reason: HOSPADM

## 2024-12-18 RX ORDER — SODIUM CHLORIDE 9 MG/ML
INJECTION, SOLUTION INTRAVENOUS PRN
Status: DISCONTINUED | OUTPATIENT
Start: 2024-12-18 | End: 2024-12-20 | Stop reason: HOSPADM

## 2024-12-18 RX ORDER — POLYETHYLENE GLYCOL 3350 17 G/17G
17 POWDER, FOR SOLUTION ORAL DAILY PRN
Status: DISCONTINUED | OUTPATIENT
Start: 2024-12-18 | End: 2024-12-20 | Stop reason: HOSPADM

## 2024-12-18 RX ORDER — IOPAMIDOL 755 MG/ML
90 INJECTION, SOLUTION INTRAVASCULAR
Status: COMPLETED | OUTPATIENT
Start: 2024-12-18 | End: 2024-12-18

## 2024-12-18 RX ORDER — LABETALOL HYDROCHLORIDE 5 MG/ML
10 INJECTION, SOLUTION INTRAVENOUS EVERY 4 HOURS PRN
Status: DISCONTINUED | OUTPATIENT
Start: 2024-12-18 | End: 2024-12-19

## 2024-12-18 RX ORDER — OLANZAPINE AND FLUOXETINE 6; 25 MG/1; MG/1
1 CAPSULE ORAL NIGHTLY
Status: DISCONTINUED | OUTPATIENT
Start: 2024-12-18 | End: 2024-12-20 | Stop reason: HOSPADM

## 2024-12-18 RX ORDER — LISINOPRIL 5 MG/1
5 TABLET ORAL DAILY
Status: DISCONTINUED | OUTPATIENT
Start: 2024-12-19 | End: 2024-12-20 | Stop reason: HOSPADM

## 2024-12-18 RX ORDER — SODIUM CHLORIDE 0.9 % (FLUSH) 0.9 %
5-40 SYRINGE (ML) INJECTION EVERY 12 HOURS SCHEDULED
Status: DISCONTINUED | OUTPATIENT
Start: 2024-12-18 | End: 2024-12-20 | Stop reason: HOSPADM

## 2024-12-18 RX ORDER — ACETAMINOPHEN 650 MG/1
650 SUPPOSITORY RECTAL EVERY 6 HOURS PRN
Status: DISCONTINUED | OUTPATIENT
Start: 2024-12-18 | End: 2024-12-20 | Stop reason: HOSPADM

## 2024-12-18 RX ORDER — ACETAMINOPHEN 325 MG/1
650 TABLET ORAL EVERY 6 HOURS PRN
Status: DISCONTINUED | OUTPATIENT
Start: 2024-12-18 | End: 2024-12-20 | Stop reason: HOSPADM

## 2024-12-18 RX ORDER — SODIUM CHLORIDE 0.9 % (FLUSH) 0.9 %
5-40 SYRINGE (ML) INJECTION PRN
Status: DISCONTINUED | OUTPATIENT
Start: 2024-12-18 | End: 2024-12-20 | Stop reason: HOSPADM

## 2024-12-18 RX ORDER — ONDANSETRON 2 MG/ML
4 INJECTION INTRAMUSCULAR; INTRAVENOUS EVERY 6 HOURS PRN
Status: DISCONTINUED | OUTPATIENT
Start: 2024-12-18 | End: 2024-12-20 | Stop reason: HOSPADM

## 2024-12-18 RX ORDER — ONDANSETRON 4 MG/1
4 TABLET, ORALLY DISINTEGRATING ORAL EVERY 8 HOURS PRN
Status: DISCONTINUED | OUTPATIENT
Start: 2024-12-18 | End: 2024-12-20 | Stop reason: HOSPADM

## 2024-12-18 RX ADMIN — SODIUM CHLORIDE: 9 INJECTION, SOLUTION INTRAVENOUS at 18:53

## 2024-12-18 RX ADMIN — IOPAMIDOL 90 ML: 755 INJECTION, SOLUTION INTRAVENOUS at 18:16

## 2024-12-18 ASSESSMENT — PAIN - FUNCTIONAL ASSESSMENT: PAIN_FUNCTIONAL_ASSESSMENT: NONE - DENIES PAIN

## 2024-12-18 ASSESSMENT — ENCOUNTER SYMPTOMS
CONSTIPATION: 0
CHOKING: 0
PHOTOPHOBIA: 0
VOMITING: 0
RHINORRHEA: 0
COUGH: 0
WHEEZING: 0
ABDOMINAL DISTENTION: 0
SORE THROAT: 0
ABDOMINAL PAIN: 0
BACK PAIN: 0
DIARRHEA: 0
COLOR CHANGE: 0
NAUSEA: 0
SHORTNESS OF BREATH: 0

## 2024-12-18 NOTE — H&P
g/dL    RDW 14.5 (H) 11.8 - 14.4 %    Platelets 251 138 - 453 k/uL    MPV 9.7 8.1 - 13.5 fL    NRBC Automated 0.0 0.0 per 100 WBC    Neutrophils % 75 (H) 36 - 65 %    Lymphocytes % 15 (L) 24 - 43 %    Monocytes % 7 3 - 12 %    Eosinophils % 2 1 - 4 %    Basophils % 1 0 - 2 %    Immature Granulocytes % 0 0 %    Neutrophils Absolute 6.12 1.50 - 8.10 k/uL    Lymphocytes Absolute 1.23 1.10 - 3.70 k/uL    Monocytes Absolute 0.57 0.10 - 1.20 k/uL    Eosinophils Absolute 0.12 0.00 - 0.44 k/uL    Basophils Absolute 0.04 0.00 - 0.20 k/uL    Immature Granulocytes Absolute <0.03 0.00 - 0.30 k/uL    RBC Morphology ANISOCYTOSIS PRESENT MICROCYTOSIS PRESENT    Troponin    Collection Time: 12/18/24  4:47 PM   Result Value Ref Range    Troponin, High Sensitivity 29 (H) 0 - 14 ng/L   Lithium Level    Collection Time: 12/18/24  4:47 PM   Result Value Ref Range    Lithium Lvl 0.4 (L) 0.6 - 1.2 mmol/L   Brain Natriuretic Peptide    Collection Time: 12/18/24  6:46 PM   Result Value Ref Range    NT Pro- 0 - 450 pg/mL         Assessment :      Primary Problem  Multiple falls    Active Hospital Problems    Diagnosis Date Noted    Multiple falls [R29.6] 12/18/2024     The patient is a 89 y.o.   /  female with past medical history of HTN, mood disorder, dementia, who presented to ED with fall today, unwitnessed fall last week. Is having SOB and fatigue last two weeks. Poor oral intake. Is on lithium for mood disorder and levels were 0.4. CT head and neck were unremarkable for traumatic concern. CTA head and neck was negative for LVO or critical stenosis.     On presentation she her blood pressure was 160s, otherwise vitally stable, laying comfortable, drowsy and pale looking. Hgb is 8.3. Cr 2.2, BUN 44. CT head was negative for acute abnormalities. CTA H&N was negative for LVO or critical stenosis. CT neck showed multilevel degenerative and degenerative disc changes.  Little change from   prior exam. She does have mild  elevation in Troponin 29. She does take lithium for mood disorder and is levels were 0.4    Impression: dehydration, EDUARDO, deconditioning, dementia, possible seizure    Plan:       Recurrent falls - EEG routine, Consider MRI in am, PT OT eval and treat     Anemia/likely iron deficiency - start iron supplements      EDUARDO/ likely prerenal azotemia - Hold lasix, start fluids 100 ml/h NS     Troponemia/ likely demand ischemia - Trend troponin    HTN - Lisinopril 5 mg daily    Dementia - Donepezil 10 mg nightly     Mood Disorder - on Lithium, and Symbyax     Hypothyroidism - on synthroid 50 mcg, check TSH     Diet -   Advance as tolerated     DVT ppx - SubQ Lovenox     Dispo - Facility once medically stable       Consultations:   IP CONSULT TO INTERNAL MEDICINE      Follow-up further recommendations after discussing the case with attending  The plan was discussed with the patient, patient's family and the medical staff.     Patient is admitted as inpatient status because of co-morbidities listed above, severity of signs and symptoms as outlined, requirement for current medical therapies and most importantly because of direct risk to patient if care not provided in a hospital setting.    Salome So MD  Neurology Resident PGY-4  12/18/2024  8:11 PM    Copy sent to Dr. Silverio, Kannan CARTER MD

## 2024-12-18 NOTE — ED PROVIDER NOTES
[facility]  Emergency Department Encounter  Emergency Medicine Resident     Pt Name:Anahi Mccarty  MRN: [id number]  Birthdate 9/21/1930  Date of evaluation: 12/18/24  PCP:  Kannan Silverio MD  Note Started: 4:29 PM EST      CHIEF COMPLAINT       Chief Complaint   Patient presents with    Head Laceration       HISTORY OF PRESENT ILLNESS  (Location/Symptom, Timing/Onset, Context/Setting, Quality, Duration, Modifying Factors, Severity.)      Anahi Mccarty is a 89 y.o. female who presents with history of EDUARDO, hypertension, who presents from nursing facility with head laceration following a fall.  Patient found at nursing facility with bleeding to posterior scalp.  History obtained from daughter who is at bedside as well as patient.  Per daughter patient was found at nursing facility on the toilet with blood covering her back and chest as well as the posterior aspect of her head.  Additionally reported that blood was scattered all over the floor.  Found to have small posterior scalp laceration.  Unknown circumstances of cause.  There was no witnessed fall.  Patient does not recall what happened.  Patient complaining of pain in the back of her head.  Daughter reports she has had shortness of breath for the past week that has progressively worsened.  Patient not complaining of any shortness of breath at this time.  Patient denies any vision changes, numbness tingling or weakness, chest pain, shortness of breath, abdominal pain, nausea, vomiting, urinary symptoms.  However limited due to patient's baseline dementia.    PAST MEDICAL / SURGICAL / SOCIAL / FAMILY HISTORY      has a past medical history of EDUARDO (acute kidney injury) (HCC), Anxiety, Depression, Hypertensive emergency, Hypertensive heart disease with chronic combined systolic and diastolic congestive heart failure (HCC), and Thyroid disease.       has no past surgical history on file.      Social History     Socioeconomic History    Marital

## 2024-12-18 NOTE — ED PROVIDER NOTES
[facility] EMERGENCY DEPARTMENT     Emergency Department     Faculty Attestation    I performed a history and physical examination of the patient and discussed management with the resident. I reviewed the resident’s note and agree with the documented findings and plan of care. Any areas of disagreement are noted on the chart. I was personally present for the key portions of any procedures. I have documented in the chart those procedures where I was not present during the key portions. I have reviewed the emergency nurses triage note. I agree with the chief complaint, past medical history, past surgical history, allergies, medications, social and family history as documented unless otherwise noted below. For Physician Assistant/ Nurse Practitioner cases/documentation I have personally evaluated this patient and have completed at least one if not all key elements of the E/M (history, physical exam, and MDM). Additional findings are as noted.    Note Started: 4:33 PM EST    Patient arrives with family daughter who provides independent history after unwitnessed fall at her nursing home.  History of dementia.  Patient was found in her room on the toilet but there was significant blood around the area.  Patient does not know what happened.  On arrival well-appearing nontoxic talking with family.  Less verbose than normal per family but no dysarthria or slurring.  Equal pupils bilaterally.  Small posterior scalp laceration bleeding controlled.  No midline neck tenderness spine tenderness.  Chest abdomen nontender.  Equal for extremity pulses throughout.  Full range of motion all joints along with tenderness deformities pelvis is stable.  No neurodeficits.  Will proceed with syncope and fall workup anticipate admission    EKG interpretation: Sinus rhythm 66.  Normal intervals normal axis there is 1 PAC.  No acute ST or T changes.      Critical Care     none    Yogesh Gamez MD, FACEP, FAAEM  Attending Emergency

## 2024-12-18 NOTE — ED NOTES
Pt presented to ED via triage for the complaint of falling resulting in lack to left post head. Pt has history of dementia and lives in ecf. Pt denies chest pain. Unsure if pt had loc. Bleeding controlled.

## 2024-12-19 ENCOUNTER — APPOINTMENT (OUTPATIENT)
Dept: MRI IMAGING | Age: 89
DRG: 683 | End: 2024-12-19
Payer: MEDICARE

## 2024-12-19 PROBLEM — E53.8 B12 DEFICIENCY: Status: ACTIVE | Noted: 2024-12-19

## 2024-12-19 LAB
ANION GAP SERPL CALCULATED.3IONS-SCNC: 10 MMOL/L (ref 9–16)
BASOPHILS # BLD: 0.04 K/UL (ref 0–0.2)
BASOPHILS NFR BLD: 1 % (ref 0–2)
BUN SERPL-MCNC: 37 MG/DL (ref 8–23)
CALCIUM SERPL-MCNC: 8.1 MG/DL (ref 8.6–10.4)
CHLORIDE SERPL-SCNC: 105 MMOL/L (ref 98–107)
CK SERPL-CCNC: 86 U/L (ref 26–192)
CO2 SERPL-SCNC: 23 MMOL/L (ref 20–31)
CREAT SERPL-MCNC: 1.9 MG/DL (ref 0.6–0.9)
CREAT UR-MCNC: 48.3 MG/DL (ref 28–217)
EOSINOPHIL # BLD: 0.21 K/UL (ref 0–0.44)
EOSINOPHILS RELATIVE PERCENT: 3 % (ref 1–4)
ERYTHROCYTE [DISTWIDTH] IN BLOOD BY AUTOMATED COUNT: 14.1 % (ref 11.8–14.4)
FERRITIN SERPL-MCNC: 16 NG/ML
FOLATE SERPL-MCNC: 5.9 NG/ML (ref 4.8–24.2)
GFR, ESTIMATED: 24 ML/MIN/1.73M2
GLUCOSE SERPL-MCNC: 104 MG/DL (ref 74–99)
HCT VFR BLD AUTO: 26 % (ref 36.3–47.1)
HGB BLD-MCNC: 7.6 G/DL (ref 11.9–15.1)
IMM GRANULOCYTES # BLD AUTO: <0.03 K/UL (ref 0–0.3)
IMM GRANULOCYTES NFR BLD: 0 %
IMM RETICS NFR: 16.9 % (ref 2.7–18.3)
IRON SATN MFR SERPL: 7 % (ref 20–55)
IRON SERPL-MCNC: 20 UG/DL (ref 37–145)
LYMPHOCYTES NFR BLD: 1.53 K/UL (ref 1.1–3.7)
LYMPHOCYTES RELATIVE PERCENT: 20 % (ref 24–43)
MCH RBC QN AUTO: 24.8 PG (ref 25.2–33.5)
MCHC RBC AUTO-ENTMCNC: 29.2 G/DL (ref 28.4–34.8)
MCV RBC AUTO: 85 FL (ref 82.6–102.9)
MONOCYTES NFR BLD: 0.44 K/UL (ref 0.1–1.2)
MONOCYTES NFR BLD: 6 % (ref 3–12)
NEUTROPHILS NFR BLD: 70 % (ref 36–65)
NEUTS SEG NFR BLD: 5.28 K/UL (ref 1.5–8.1)
NRBC BLD-RTO: 0 PER 100 WBC
PLATELET # BLD AUTO: 208 K/UL (ref 138–453)
PMV BLD AUTO: 9.5 FL (ref 8.1–13.5)
POTASSIUM SERPL-SCNC: 4.5 MMOL/L (ref 3.7–5.3)
RBC # BLD AUTO: 3.06 M/UL (ref 3.95–5.11)
RETIC HEMOGLOBIN: 23.2 PG (ref 28.2–35.7)
RETICS # AUTO: 0.04 M/UL (ref 0.03–0.08)
RETICS/RBC NFR AUTO: 1.3 % (ref 0.5–1.9)
SODIUM SERPL-SCNC: 138 MMOL/L (ref 136–145)
SODIUM UR-SCNC: 80 MMOL/L
TIBC SERPL-MCNC: 276 UG/DL (ref 250–450)
TRANSFERRIN SERPL-MCNC: 229 MG/DL (ref 200–360)
TROPONIN I SERPL HS-MCNC: 23 NG/L (ref 0–14)
UNSATURATED IRON BINDING CAPACITY: 256 UG/DL (ref 112–347)
UUN UR-MCNC: 371 MG/DL
VIT B12 SERPL-MCNC: 186 PG/ML (ref 232–1245)
WBC OTHER # BLD: 7.5 K/UL (ref 3.5–11.3)

## 2024-12-19 PROCEDURE — 95816 EEG AWAKE AND DROWSY: CPT

## 2024-12-19 PROCEDURE — 6360000002 HC RX W HCPCS

## 2024-12-19 PROCEDURE — 84484 ASSAY OF TROPONIN QUANT: CPT

## 2024-12-19 PROCEDURE — 6370000000 HC RX 637 (ALT 250 FOR IP)

## 2024-12-19 PROCEDURE — 82728 ASSAY OF FERRITIN: CPT

## 2024-12-19 PROCEDURE — 99222 1ST HOSP IP/OBS MODERATE 55: CPT | Performed by: INTERNAL MEDICINE

## 2024-12-19 PROCEDURE — 2500000003 HC RX 250 WO HCPCS

## 2024-12-19 PROCEDURE — 70551 MRI BRAIN STEM W/O DYE: CPT

## 2024-12-19 PROCEDURE — 95819 EEG AWAKE AND ASLEEP: CPT | Performed by: PSYCHIATRY & NEUROLOGY

## 2024-12-19 PROCEDURE — 84466 ASSAY OF TRANSFERRIN: CPT

## 2024-12-19 PROCEDURE — 97530 THERAPEUTIC ACTIVITIES: CPT

## 2024-12-19 PROCEDURE — 82746 ASSAY OF FOLIC ACID SERUM: CPT

## 2024-12-19 PROCEDURE — 83540 ASSAY OF IRON: CPT

## 2024-12-19 PROCEDURE — 82550 ASSAY OF CK (CPK): CPT

## 2024-12-19 PROCEDURE — 83550 IRON BINDING TEST: CPT

## 2024-12-19 PROCEDURE — 2060000000 HC ICU INTERMEDIATE R&B

## 2024-12-19 PROCEDURE — 6360000002 HC RX W HCPCS: Performed by: PSYCHIATRY & NEUROLOGY

## 2024-12-19 PROCEDURE — 36415 COLL VENOUS BLD VENIPUNCTURE: CPT

## 2024-12-19 PROCEDURE — 85025 COMPLETE CBC W/AUTO DIFF WBC: CPT

## 2024-12-19 PROCEDURE — 6360000002 HC RX W HCPCS: Performed by: INTERNAL MEDICINE

## 2024-12-19 PROCEDURE — 80048 BASIC METABOLIC PNL TOTAL CA: CPT

## 2024-12-19 PROCEDURE — 2580000003 HC RX 258

## 2024-12-19 PROCEDURE — 99232 SBSQ HOSP IP/OBS MODERATE 35: CPT | Performed by: PSYCHIATRY & NEUROLOGY

## 2024-12-19 PROCEDURE — 2580000003 HC RX 258: Performed by: INTERNAL MEDICINE

## 2024-12-19 PROCEDURE — 97162 PT EVAL MOD COMPLEX 30 MIN: CPT

## 2024-12-19 PROCEDURE — 97166 OT EVAL MOD COMPLEX 45 MIN: CPT

## 2024-12-19 PROCEDURE — 82607 VITAMIN B-12: CPT

## 2024-12-19 PROCEDURE — 85045 AUTOMATED RETICULOCYTE COUNT: CPT

## 2024-12-19 RX ORDER — CYANOCOBALAMIN 1000 UG/ML
1000 INJECTION, SOLUTION INTRAMUSCULAR; SUBCUTANEOUS DAILY
Status: DISCONTINUED | OUTPATIENT
Start: 2024-12-19 | End: 2024-12-20 | Stop reason: HOSPADM

## 2024-12-19 RX ORDER — FOLIC ACID 1 MG/1
1 TABLET ORAL DAILY
Status: DISCONTINUED | OUTPATIENT
Start: 2024-12-19 | End: 2024-12-20 | Stop reason: HOSPADM

## 2024-12-19 RX ORDER — AMLODIPINE BESYLATE 10 MG/1
5 TABLET ORAL DAILY
Status: DISCONTINUED | OUTPATIENT
Start: 2024-12-19 | End: 2024-12-20

## 2024-12-19 RX ORDER — HYDRALAZINE HYDROCHLORIDE 20 MG/ML
10 INJECTION INTRAMUSCULAR; INTRAVENOUS EVERY 6 HOURS PRN
Status: DISCONTINUED | OUTPATIENT
Start: 2024-12-19 | End: 2024-12-19

## 2024-12-19 RX ORDER — HYDRALAZINE HYDROCHLORIDE 20 MG/ML
INJECTION INTRAMUSCULAR; INTRAVENOUS
Status: COMPLETED
Start: 2024-12-19 | End: 2024-12-19

## 2024-12-19 RX ORDER — CETIRIZINE HYDROCHLORIDE 10 MG/1
10 TABLET ORAL DAILY
Status: DISCONTINUED | OUTPATIENT
Start: 2024-12-19 | End: 2024-12-20 | Stop reason: HOSPADM

## 2024-12-19 RX ORDER — LITHIUM CARBONATE 300 MG
150 TABLET ORAL DAILY
Status: DISCONTINUED | OUTPATIENT
Start: 2024-12-19 | End: 2024-12-20 | Stop reason: HOSPADM

## 2024-12-19 RX ORDER — OXYCODONE AND ACETAMINOPHEN 5; 325 MG/1; MG/1
1 TABLET ORAL ONCE
Status: COMPLETED | OUTPATIENT
Start: 2024-12-19 | End: 2024-12-19

## 2024-12-19 RX ORDER — TROSPIUM CHLORIDE 20 MG/1
20 TABLET, FILM COATED ORAL
Status: DISCONTINUED | OUTPATIENT
Start: 2024-12-19 | End: 2024-12-20 | Stop reason: HOSPADM

## 2024-12-19 RX ORDER — LABETALOL HYDROCHLORIDE 5 MG/ML
10 INJECTION, SOLUTION INTRAVENOUS
Status: DISCONTINUED | OUTPATIENT
Start: 2024-12-19 | End: 2024-12-20

## 2024-12-19 RX ORDER — HYDRALAZINE HYDROCHLORIDE 20 MG/ML
5 INJECTION INTRAMUSCULAR; INTRAVENOUS EVERY 6 HOURS PRN
Status: DISCONTINUED | OUTPATIENT
Start: 2024-12-19 | End: 2024-12-19

## 2024-12-19 RX ORDER — FERROUS SULFATE 325(65) MG
325 TABLET, DELAYED RELEASE (ENTERIC COATED) ORAL
Status: DISCONTINUED | OUTPATIENT
Start: 2024-12-19 | End: 2024-12-20 | Stop reason: HOSPADM

## 2024-12-19 RX ORDER — BUTALBITAL, ACETAMINOPHEN AND CAFFEINE 50; 325; 40 MG/1; MG/1; MG/1
1 TABLET ORAL EVERY 6 HOURS PRN
Status: DISCONTINUED | OUTPATIENT
Start: 2024-12-19 | End: 2024-12-20 | Stop reason: HOSPADM

## 2024-12-19 RX ORDER — ALPRAZOLAM 0.25 MG/1
0.25 TABLET ORAL 3 TIMES DAILY PRN
Status: DISCONTINUED | OUTPATIENT
Start: 2024-12-19 | End: 2024-12-20 | Stop reason: HOSPADM

## 2024-12-19 RX ADMIN — SODIUM CHLORIDE, PRESERVATIVE FREE 10 ML: 5 INJECTION INTRAVENOUS at 00:13

## 2024-12-19 RX ADMIN — CETIRIZINE HYDROCHLORIDE 10 MG: 10 TABLET, FILM COATED ORAL at 13:46

## 2024-12-19 RX ADMIN — SODIUM CHLORIDE 200 MG: 9 INJECTION, SOLUTION INTRAVENOUS at 15:46

## 2024-12-19 RX ADMIN — ACETAMINOPHEN 650 MG: 325 TABLET ORAL at 00:11

## 2024-12-19 RX ADMIN — HEPARIN SODIUM 5000 UNITS: 5000 INJECTION INTRAVENOUS; SUBCUTANEOUS at 00:12

## 2024-12-19 RX ADMIN — Medication 10 MG: at 17:06

## 2024-12-19 RX ADMIN — Medication 10 MG: at 09:10

## 2024-12-19 RX ADMIN — AMLODIPINE BESYLATE 5 MG: 10 TABLET ORAL at 21:12

## 2024-12-19 RX ADMIN — CYANOCOBALAMIN 1000 MCG: 1000 INJECTION, SOLUTION INTRAMUSCULAR; SUBCUTANEOUS at 12:54

## 2024-12-19 RX ADMIN — ACETAMINOPHEN 650 MG: 325 TABLET ORAL at 09:10

## 2024-12-19 RX ADMIN — HEPARIN SODIUM 5000 UNITS: 5000 INJECTION INTRAVENOUS; SUBCUTANEOUS at 12:54

## 2024-12-19 RX ADMIN — SODIUM CHLORIDE: 9 INJECTION, SOLUTION INTRAVENOUS at 08:27

## 2024-12-19 RX ADMIN — DONEPEZIL HYDROCHLORIDE 10 MG: 10 TABLET, FILM COATED ORAL at 19:51

## 2024-12-19 RX ADMIN — LITHIUM CARBONATE 150 MG: 300 TABLET ORAL at 13:45

## 2024-12-19 RX ADMIN — SODIUM CHLORIDE, PRESERVATIVE FREE 10 ML: 5 INJECTION INTRAVENOUS at 19:51

## 2024-12-19 RX ADMIN — FERROUS SULFATE TAB EC 325 MG (65 MG FE EQUIVALENT) 325 MG: 325 (65 FE) TABLET DELAYED RESPONSE at 08:25

## 2024-12-19 RX ADMIN — HEPARIN SODIUM 5000 UNITS: 5000 INJECTION INTRAVENOUS; SUBCUTANEOUS at 07:40

## 2024-12-19 RX ADMIN — FOLIC ACID 1 MG: 1 TABLET ORAL at 12:57

## 2024-12-19 RX ADMIN — HYDRALAZINE HYDROCHLORIDE 10 MG: 20 INJECTION INTRAMUSCULAR; INTRAVENOUS at 08:29

## 2024-12-19 RX ADMIN — LEVOTHYROXINE SODIUM 50 MCG: 0.05 TABLET ORAL at 07:40

## 2024-12-19 RX ADMIN — DONEPEZIL HYDROCHLORIDE 10 MG: 10 TABLET, FILM COATED ORAL at 00:12

## 2024-12-19 RX ADMIN — LABETALOL HYDROCHLORIDE 10 MG: 5 INJECTION, SOLUTION INTRAVENOUS at 02:52

## 2024-12-19 RX ADMIN — ALPRAZOLAM 0.25 MG: 0.25 TABLET ORAL at 12:54

## 2024-12-19 RX ADMIN — OXYCODONE HYDROCHLORIDE AND ACETAMINOPHEN 1 TABLET: 5; 325 TABLET ORAL at 03:18

## 2024-12-19 RX ADMIN — HEPARIN SODIUM 5000 UNITS: 5000 INJECTION INTRAVENOUS; SUBCUTANEOUS at 21:14

## 2024-12-19 ASSESSMENT — ENCOUNTER SYMPTOMS
RHINORRHEA: 0
NAUSEA: 0
COUGH: 0
SHORTNESS OF BREATH: 0
WHEEZING: 0
BACK PAIN: 0
VOMITING: 0
CONSTIPATION: 0
COLOR CHANGE: 0
ABDOMINAL DISTENTION: 0
DIARRHEA: 0
SORE THROAT: 0
CHOKING: 0
PHOTOPHOBIA: 0
ABDOMINAL PAIN: 0

## 2024-12-19 ASSESSMENT — PAIN SCALES - GENERAL
PAINLEVEL_OUTOF10: 5
PAINLEVEL_OUTOF10: 4
PAINLEVEL_OUTOF10: 7

## 2024-12-19 ASSESSMENT — PAIN DESCRIPTION - DESCRIPTORS
DESCRIPTORS: ACHING
DESCRIPTORS: ACHING

## 2024-12-19 ASSESSMENT — PAIN DESCRIPTION - LOCATION
LOCATION: GENERALIZED
LOCATION: GENERALIZED

## 2024-12-19 NOTE — PROGRESS NOTES
in place  Restraints  Restraints Initially in Place: No    Restrictions  Restrictions/Precautions  Restrictions/Precautions: General Precautions, Fall Risk  Activity Level: Up as Tolerated  Required Braces or Orthoses?: No     Subjective  General  Patient assessed for rehabilitation services?: Yes  Response To Previous Treatment: Not applicable  Family/Caregiver Present: No  Follows Commands: Within Functional Limits  General  General Comments: RN and pt agreeable to PT. Pt alert in bed upon arrival. OT co-eval  Subjective  Subjective: Pt reports head hurts, not rated, no c/o any numbness or tingling. Pt provided increased mobility/ ambulation, distraction and emotional support to address pain.         Social/Functional History  Social/Functional History  Lives With: Other (Comment) (facility)  Type of Home: Facility  Home Equipment: Walker - Rolling  Prior Level of Assist for ADLs: Needs assistance  Toileting: Needs assistance  Prior Level of Assist for Homemaking: Needs assistance  Homemaking Responsibilities: No  Prior Level of Assist for Transfers: Needs assistance  Active : No  Mode of Transportation: Family  Additional Comments: Pt reports living at home, alone. I all tasks.  Vision/Hearing  Vision  Vision: Impaired  Vision Exceptions: Wears glasses for reading  Hearing  Hearing: Exceptions to WFL  Hearing Exceptions: Hard of hearing/hearing concerns    Cognition   Orientation  Orientation Level: Oriented to place;Oriented to person;Disoriented to situation;Disoriented to time  Cognition  Overall Cognitive Status: Exceptions  Arousal/Alertness: Appropriate responses to stimuli  Following Commands: Follows one step commands with increased time;Follows one step commands with repetition  Attention Span: Attends with cues to redirect  Safety Judgement: Decreased awareness of need for assistance  Problem Solving: Assistance required to identify errors made;Assistance required to correct errors made  Insights:  Decreased awareness of deficits  Initiation: Requires cues for some  Sequencing: Requires cues for some    Objective    Blood Pressure Lyin/37 (84)  Blood Pressure Sittin/38 (91)  Blood Pressure Standin/56 (94)  Pulse Lyin PER MINUTE  Pulse Sittin PER MINUTE  Pulse Standin PER MINUTE             AROM RLE (degrees)  RLE AROM: WFL  RLE General AROM: except DF to neutral  AROM LLE (degrees)  LLE AROM : WFL  LLE General AROM: except DF to neutral  AROM RUE (degrees)  RUE General AROM: see OT  AROM LUE (degrees)  LUE General AROM: see OT  Strength RLE  Strength RLE: WFL  Comment: grossly 4-/5  Strength LLE  Strength LLE: WFL  Comment: grossly 4-/5  Strength RUE  Comment: antigravity observed, see OT  Strength LUE  Comment: antigravity observed, see OT           Bed mobility  Supine to Sit: 2 Person assistance;Maximum assistance  Sit to Supine: 2 Person assistance;Maximum assistance  Scooting: Maximal assistance  Bed Mobility Comments: performed 2x  Transfers  Sit to Stand: Minimal Assistance  Stand to Sit: Minimal Assistance  Ambulation  Comments: not attempted, pt c/o dizziness limiting and cold despite blankets. Pt did stand long enough for BP to finish for orthostatics.     Balance  Posture: Fair  Sitting - Static: Fair;+  Sitting - Dynamic: Fair  Standing - Static: Fair  Comments: RW used while assessing standing balance  Exercise Treatment: EOB 2 times. first time pt c/o dizziness limiting. pt returned flat supine. Pt with increased time for all mobility.     AM-PAC - Mobility    AM-PAC Basic Mobility - Inpatient   How much help is needed turning from your back to your side while in a flat bed without using bedrails?: A Lot  How much help is needed moving from lying on your back to sitting on the side of a flat bed without using bedrails?: Total  How much help is needed moving to and from a bed to a chair?: A Lot  How much help is needed standing up from a chair using your arms?: A

## 2024-12-19 NOTE — PROGRESS NOTES
[facility] Neurology   IN-PATIENT SERVICE   [facility]    Progress Note             Date:   12/19/2024  Patient name:  Anahi Mccarty  Date of admission:  12/18/2024  4:27 PM  MRN:   [id number]  Account:  [id number]  YOB: 1930  PCP:    Kannan Silverio MD  Room:   [address] [place] [address]  Code Status:    Full Code    Chief Complaint:     Chief Complaint   Patient presents with    Head Laceration       Interval hx:     The patient was seen and examined at bedside.     Alert and oriented x 2 at her baseline . No acute events overnight.    The patient stated that is not feeling good and is having headaches. Has been drinking. IV fluids was started after she came to floor.    Has been having high BP readings overnight after holding lasix and lisinopril - hydralazine 10 mg PRN for Sbp > 160 was added   Was given tylenol with limited effect       CT head was unremarkable     Patient was able to ambulate to bathroom today with RN assistance    Pending PT OT jannet       Brief History of Present Illness:     The patient is a 89 y.o.   /  female with past medical history of HTN, hypothyroid, mood disorder, dementia, who presented to ED with fall on 12/18/24      Patient was in her usual state of health living in facility, using a rolling walker to ambulate until staff checked on her this morning and found a pool of blood in the room and apparently patient moved herself to the bathroom and was found to have blood covering her clothes with a laceration to the left side of her scalp, patient at that time did not seem to have any confusion, no clear weakness.  Daughter stated that she also noticed a bruise on her back last week, and seems like patient had an unwitnessed fall at that time.  She was having shortness of breath for the last 2 weeks mostly on excerption. No chest pain, palpitation, lightheadedness, or visual disturbance, no fever, symptoms of infections. She also reports that  Resp 13   Wt 65.8 kg (145 lb)   SpO2 99%   BMI 29.29 kg/m²   Temp (24hrs), Av.9 °F (36.6 °C), Min:97.6 °F (36.4 °C), Max:98.3 °F (36.8 °C)    No results for input(s): \"POCGLU\" in the last 72 hours.  No intake or output data in the 24 hours ending 24 1104      Neurologic Exam     GENERAL  Appears comfortable and in no distress   HEENT  NC/ AT   HEART  S1 and S2 heard; palpation of pulses: radial pulse    NECK  Supple and no bruits heard   MENTAL STATUS:  Alert, oriented x 2, intact memory, no confusion, normal speech, normal language, no hallucination or delusion   CRANIAL NERVES: II     -      Visual fields intact to confrontation  III,IV,VI -  PERR, EOMs full, no ptosis  V     -     Normal facial sensation   VII    -     Normal facial symmetry  VIII   -     Intact hearing   IX,X -     Symmetrical palate  XI    -     Symmetrical shoulder shrug  XII   -     Midline tongue, no atrophy    MOTOR FUNCTION: RUE: Significant for good strength of grade 4+/5 in proximal and distal muscle groups   LUE: Significant for good strength of grade 4+/5 in proximal and distal muscle groups   RLE: Significant for good strength of grade 4+/5 in proximal and distal muscle groups   LLE: Significant for good strength of grade 4+/5 in proximal and distal muscle groups       Normal bulk, normal tone and no involuntary movements, no tremor   SENSORY FUNCTION:  Normal touch, normal pinprick, normal vibration, normal proprioception   CEREBELLAR FUNCTION:  Intact fine motor control over upper limbs and lower limbs   REFLEX FUNCTION:  Symmetric in upper and lower extremities, no Babinski sign   STATION and GAIT  Deferred to PT        Investigations:      Laboratory Testing:  Recent Results (from the past 24 hour(s))   BMP    Collection Time: 24  4:47 PM   Result Value Ref Range    Sodium 140 136 - 145 mmol/L    Potassium 4.7 3.7 - 5.3 mmol/L    Chloride 104 98 - 107 mmol/L    CO2 25 20 - 31 mmol/L    Anion Gap 11 9 - 16 mmol/L

## 2024-12-19 NOTE — PROGRESS NOTES
Pt arrived to floor via stretcher from ED and transfered to bed. Telemetry activated. Patient oriented to room and use of call light. Call light and personal items within reach. Admission and assessment initiated. Patient is poor historian, unable to complete admission history at this time. POC and education initiated. Erick #[id number] initiated for a brief time. Denied further needs or questions at this time. Will continue to monitor.

## 2024-12-19 NOTE — CARE COORDINATION
Case Management Assessment  Initial Evaluation    Date/Time of Evaluation: 12/19/2024 10:26 AM  Assessment Completed by: ROSEMARY ELIZABETH RN    If patient is discharged prior to next notation, then this note serves as note for discharge by case management.    Patient Name: Anahi Mccarty                   YOB: 1930  Diagnosis: Multiple falls [R29.6]                   Date / Time: 12/18/2024  4:27 PM    Patient Admission Status: Inpatient   Readmission Risk (Low < 19, Mod (19-27), High > 27): Readmission Risk Score: 16.2    Current PCP: Kannan Silverio MD  PCP verified by CM? Yes    Chart Reviewed: Yes      History Provided by: Child/Family (son John Paul & daughter Tommy)  Patient Orientation: Other (see comment) (dementia, language barrier)    Patient Cognition: Dementia / Early Alzheimer's (dementia, language barrier)    Hospitalization in the last 30 days (Readmission):  No    If yes, Readmission Assessment in  Navigator will be completed.    Advance Directives:      Code Status: Full Code   Patient's Primary Decision Maker is:      Primary Decision Maker: Tommy Giordano - Child - [phone]    Secondary Decision Maker: John Paul Mccarty - Child - [phone]    Discharge Planning:    Patient lives with: Alone (assisted living) Type of Home: Assisted living  Primary Care Giver: Other (Comment) (senior living facility)  Patient Support Systems include: Children, Other (Comment) (facility)   Current Financial resources:    Current community resources:    Current services prior to admission: Durable Medical Equipment, Extended Care Facility            Current DME: Walker            Type of Home Care services:  None    ADLS  Prior functional level: Assistance with the following:, Mobility, Shopping, Housework, Cooking, Bathing, Dressing, Toileting  Current functional level: Assistance with the following:, Mobility, Shopping, Housework, Cooking, Bathing, Dressing, Toileting    PT AM-PAC:   /24  OT  AM-PAC:   /24    Family can provide assistance at DC: No  Would you like Case Management to discuss the discharge plan with any other family members/significant others, and if so, who? Yes (children)  Plans to Return to Present Housing: Yes  Other Identified Issues/Barriers to RETURNING to current housing: mobility  Potential Assistance needed at discharge: Home Care (referral to [address])            Potential DME:    Patient expects to discharge to: Assisted living  Plan for transportation at discharge:      Financial    Payor: MEDICARE / Plan: MEDICARE PART A AND B / Product Type: *No Product type* /     Does insurance require precert for SNF: No    Potential assistance Purchasing Medications: No  Meds-to-Beds request: Yes      [facility] [id number] - [place], [place] - [address] [phone] - F [phone]  [address] [place] [address]  Phone: [phone] Fax: [phone]      Notes:    Factors facilitating achievement of predicted outcomes: Family support    Barriers to discharge: Medical complications    Additional Case Management Notes: return to [facility], referral to [address], family to transport  Spoke with Justine in admissions at [facility], patient may return, requesting DC paperwork be faxed to her [phone] call from Shaneka at [address], they can accept  The Plan for Transition of Care is related to the following treatment goals of Multiple falls [R29.6]    IF APPLICABLE: The Patient and/or patient representative Anahi and her family were provided with a choice of provider and agrees with the discharge plan. Freedom of choice list with basic dialogue that supports the patient's individualized plan of care/goals and shares the quality data associated with the providers was provided to:     Patient Representative Name:       The Patient and/or Patient Representative Agree with the Discharge Plan?      ROSEMARY ELIZABETH RN  Case Management Department  Ph:  Fax:

## 2024-12-19 NOTE — CONSULTS
[facility]  Office: [phone]  Jose A Paul DO, Brian Crump DO, Samir Omer DO, Sergio Crystal DO, Camilo Rahman MD, Bianca Orozco MD, Esa Hughes MD, Meredith Dunlap MD,  David Ricketts MD, Arben Gonzalez MD, Dorina Chacko MD,  Bogdan Parikh DO, Isaiah Hall MD, Jonah Hanson MD, Yogesh Paul DO, Maria Teresa Gupta MD,  Theo Mccall DO, Savannah Crowell MD, Luly Espinoza MD, Suyapa Villanueva MD, Blanca Hess MD,  Madi Pak MD, Fred Delarosa MD, Jovanny Lafleur MD, Spenser Avelar MD, Quintin Aranda MD, Jacky Simon MD, Willy Davis DO, Kraig Bose MD, Shirley Waterhouse, CNP,  Lenore Armenta, CNP, Willy Piña, CNP,  Zoë Bhat, HCRIS, Eileen Temple, CNP, Gabriela Montiel, CNP, Janelle Hobbs, CNP, Bibi Allne, CNP, Emerald Conner PA-C, Diane Alba PA-C, Marcie Stone, CNP, Sae Wen, CNP,  Mica Infante, CNP, Alanna Garcia, CNP,  Rubi Gtz, CNP, Demi Lerma, CNP         [facility]   IN-PATIENT SERVICE   [facility]    CONSULTATION / HISTORY AND PHYSICAL EXAMINATION            Date:   12/19/2024  Patient name:  Anahi Mccarty  Date of admission:  12/18/2024  4:27 PM  MRN:   [id number]  Account:  [id number]  YOB: 1930  PCP:    Kannan Silverio MD  Room:   0119/0119-01  Code Status:    Full Code    Physician Requesting Consult: Marcy Mcconnell DO    Reason for Consult: Medical management of EDUARDO blood pressure    Chief Complaint:     Chief Complaint   Patient presents with    Head Laceration       History Obtained From:     electronic medical record    History of Present Illness:     This is a 89-year-old female who initially presented to the hospital for evaluation after found on the ground in a pool of blood in her room.  Patient has a history of dementia and lives in a nursing facility.  On admission, patient was hypertensive and found to have an acute kidney injury with a creatinine of  3.06 (L) 3.95 - 5.11 m/uL    Hemoglobin 7.6 (L) 11.9 - 15.1 g/dL    Hematocrit 26.0 (L) 36.3 - 47.1 %    MCV 85.0 82.6 - 102.9 fL    MCH 24.8 (L) 25.2 - 33.5 pg    MCHC 29.2 28.4 - 34.8 g/dL    RDW 14.1 11.8 - 14.4 %    Platelets 208 138 - 453 k/uL    MPV 9.5 8.1 - 13.5 fL    NRBC Automated 0.0 0.0 per 100 WBC    Neutrophils % 70 (H) 36 - 65 %    Lymphocytes % 20 (L) 24 - 43 %    Monocytes % 6 3 - 12 %    Eosinophils % 3 1 - 4 %    Basophils % 1 0 - 2 %    Immature Granulocytes % 0 0 %    Neutrophils Absolute 5.28 1.50 - 8.10 k/uL    Lymphocytes Absolute 1.53 1.10 - 3.70 k/uL    Monocytes Absolute 0.44 0.10 - 1.20 k/uL    Eosinophils Absolute 0.21 0.00 - 0.44 k/uL    Basophils Absolute 0.04 0.00 - 0.20 k/uL    Immature Granulocytes Absolute <0.03 0.00 - 0.30 k/uL   Vitamin B12 & Folate    Collection Time: 12/19/24  6:01 AM   Result Value Ref Range    Vitamin B-12 186 (L) 232 - 1245 pg/mL    Folate 5.9 4.8 - 24.2 ng/mL   Iron and TIBC    Collection Time: 12/19/24  6:01 AM   Result Value Ref Range    Iron 20 (L) 37 - 145 ug/dL    TIBC 276 250 - 450 ug/dL    Iron % Saturation 7 (L) 20 - 55 %    UIBC 256 112 - 347 ug/dL   Transferrin    Collection Time: 12/19/24  6:01 AM   Result Value Ref Range    Transferrin 229 200 - 360 mg/dL   Ferritin    Collection Time: 12/19/24  6:01 AM   Result Value Ref Range    Ferritin 16 ng/mL   Reticulocytes    Collection Time: 12/19/24  6:01 AM   Result Value Ref Range    Retic Ct Pct 1.3 0.5 - 1.9 %    Absolute Retic # 0.040 0.030 - 0.080 M/uL    Immature Retic Fract 16.9 2.7 - 18.3 %    Retic Hemoglobin 23.2 (L) 28.2 - 35.7 pg   CK    Collection Time: 12/19/24  6:01 AM   Result Value Ref Range    Total CK 86 26 - 192 U/L       Imaging/Diagonstics:  US RETROPERITONEAL LIMITED    Result Date: 12/19/2024  1. No hydronephrosis. 2. The kidneys are small with diffuse thinning/loss of renal cortex.  Suspect chronic renal parenchymal disease.     CTA HEAD NECK W CONTRAST    Result Date:

## 2024-12-19 NOTE — DISCHARGE INSTR - COC
trauma S09.90XA    Elevated creatine kinase R74.8    Anemia D64.9    Elevated serum creatinine R79.89       Isolation/Infection:   Isolation            No Isolation          Patient Infection Status       None to display            Nurse Assessment:  Last Vital Signs: BP (!) 127/43   Pulse 58   Temp 98.3 °F (36.8 °C)   Resp 13   Wt 65.8 kg (145 lb)   SpO2 99%   BMI 29.29 kg/m²     Last documented pain score (0-10 scale): Pain Level: 4  Last Weight:   Wt Readings from Last 1 Encounters:   12/18/24 65.8 kg (145 lb)     Mental Status:  oriented, alert, and baseline confusion at times    IV Access:  - None    Nursing Mobility/ADLs:  Walking   Assisted  Transfer  Assisted  Bathing  Assisted  Dressing  Assisted  Toileting  Assisted  Feeding  Independent  Med Admin  Assisted  Med Delivery   whole    Wound Care Documentation and Therapy:        Elimination:  Continence:   Bowel: Yes  Bladder: Yes  Urinary Catheter: None   Colostomy/Ileostomy/Ileal Conduit: No       Date of Last BM: 12/20/24    No intake or output data in the 24 hours ending 12/19/24 1041  No intake/output data recorded.    Safety Concerns:     History of Falls (last 30 days) and At Risk for Falls    Impairments/Disabilities:      Language Barrier - Turkmen speaking    Nutrition Therapy:  Current Nutrition Therapy:   - Oral Diet:  General    Routes of Feeding: Oral  Liquids: Thin Liquids  Daily Fluid Restriction: no  Last Modified Barium Swallow with Video (Video Swallowing Test): not done    Treatments at the Time of Hospital Discharge:   Respiratory Treatments: n/a    Oxygen Therapy:  is not on home oxygen therapy.  Ventilator:    - No ventilator support    Rehab Therapies: Physical Therapy and Occupational Therapy  Weight Bearing Status/Restrictions: No weight bearing restrictions  Other Medical Equipment (for information only, NOT a DME order):  walker  Other Treatments: n/a      Patient's personal belongings (please select all that are sent with

## 2024-12-19 NOTE — ED NOTES
Pt assisted with bedpan. Urine sample obtained at this time.   Food provided per diet orders.   Pt denies other needs at this time.

## 2024-12-19 NOTE — PROGRESS NOTES
Occupational Therapy Initial Evaluation  Facility/Department: [address] NEURO ICU   Patient Name: Anahi Mccarty        MRN: [id number]    : 1930    Date of Service: 2024    Chief Complaint   Patient presents with    Head Laceration     Past Medical History:  has a past medical history of EDUARDO (acute kidney injury) (HCC), Anxiety, Depression, Hypertensive emergency, Hypertensive heart disease with chronic combined systolic and diastolic congestive heart failure (HCC), and Thyroid disease.  Past Surgical History:  has no past surgical history on file.    Discharge Recommendations  Discharge Recommendations: Patient would benefit from continued therapy after discharge  OT Equipment Recommendations  Equipment Needed: No    Assessment  Performance deficits / Impairments: Decreased functional mobility ;Decreased ADL status;Decreased cognition;Decreased endurance  Assessment: Pt requires Max A x2 for bed mobility and able to maintain sitting balance with CGA seated EOB for functional tasks. Thorughout session pt reports feeling cold with progressive dizziness impacting activity tolerance. Pt unable to take steps along bedside with RW support at this time despite walking to the bathroom earlier with nursing. Orthostatics taken and reported to RN at end of session. No significant fluctuation in BP or HR thorughout session. Pt with hx of dementia and living in a nursing facility per chart. Pt is A&O x1 this session and reports living alone and IND with all tasks. Pt limited by above deficits impacting functional performance. Pt would benefit from continued therapy prior to and following discharge from acute setting to increase safety and IND in self care.  Prognosis: Good  Decision Making: Medium Complexity  REQUIRES OT FOLLOW-UP: Yes  Activity Tolerance  Activity Tolerance: Treatment limited secondary to decreased cognition  Safety Devices  Type of Devices: Call light within reach;Nurse notified;All fall risk

## 2024-12-19 NOTE — CARE COORDINATION
SBIRT is deferred as pt has a diagnoses of dementia.          Alcohol Screening and Brief Intervention        Deferred [x]    Completed on: 12/19/2024   PREETI HOLLY

## 2024-12-20 VITALS
BODY MASS INDEX: 29.23 KG/M2 | HEIGHT: 59 IN | DIASTOLIC BLOOD PRESSURE: 51 MMHG | WEIGHT: 145 LBS | SYSTOLIC BLOOD PRESSURE: 164 MMHG | HEART RATE: 67 BPM | OXYGEN SATURATION: 98 % | RESPIRATION RATE: 19 BRPM | TEMPERATURE: 98.1 F

## 2024-12-20 PROBLEM — R41.0 EPISODIC CONFUSION: Status: ACTIVE | Noted: 2024-12-20

## 2024-12-20 LAB
ANION GAP SERPL CALCULATED.3IONS-SCNC: 11 MMOL/L (ref 9–16)
ANION GAP SERPL CALCULATED.3IONS-SCNC: 8 MMOL/L (ref 9–16)
BASOPHILS # BLD: 0.1 K/UL (ref 0–0.2)
BASOPHILS NFR BLD: 1 % (ref 0–2)
BUN SERPL-MCNC: 21 MG/DL (ref 8–23)
BUN SERPL-MCNC: 28 MG/DL (ref 8–23)
CALCIUM SERPL-MCNC: 8.7 MG/DL (ref 8.6–10.4)
CALCIUM SERPL-MCNC: 8.7 MG/DL (ref 8.6–10.4)
CHLORIDE SERPL-SCNC: 110 MMOL/L (ref 98–107)
CHLORIDE SERPL-SCNC: 110 MMOL/L (ref 98–107)
CO2 SERPL-SCNC: 20 MMOL/L (ref 20–31)
CO2 SERPL-SCNC: 22 MMOL/L (ref 20–31)
CREAT SERPL-MCNC: 1.3 MG/DL (ref 0.6–0.9)
CREAT SERPL-MCNC: 1.5 MG/DL (ref 0.6–0.9)
EOSINOPHIL # BLD: 0.1 K/UL (ref 0–0.44)
EOSINOPHILS RELATIVE PERCENT: 1 % (ref 1–4)
ERYTHROCYTE [DISTWIDTH] IN BLOOD BY AUTOMATED COUNT: 14.4 % (ref 11.8–14.4)
GFR, ESTIMATED: 32 ML/MIN/1.73M2
GFR, ESTIMATED: 38 ML/MIN/1.73M2
GLUCOSE SERPL-MCNC: 110 MG/DL (ref 74–99)
GLUCOSE SERPL-MCNC: 130 MG/DL (ref 74–99)
HCT VFR BLD AUTO: 29.9 % (ref 36.3–47.1)
HGB BLD-MCNC: 8.4 G/DL (ref 11.9–15.1)
IMM GRANULOCYTES # BLD AUTO: 0.1 K/UL (ref 0–0.3)
IMM GRANULOCYTES NFR BLD: 1 %
LYMPHOCYTES NFR BLD: 0.62 K/UL (ref 1.1–3.7)
LYMPHOCYTES RELATIVE PERCENT: 6 % (ref 24–43)
MCH RBC QN AUTO: 24.9 PG (ref 25.2–33.5)
MCHC RBC AUTO-ENTMCNC: 28.1 G/DL (ref 28.4–34.8)
MCV RBC AUTO: 88.7 FL (ref 82.6–102.9)
MONOCYTES NFR BLD: 0.41 K/UL (ref 0.1–1.2)
MONOCYTES NFR BLD: 4 % (ref 3–12)
MORPHOLOGY: NORMAL
NEUTROPHILS NFR BLD: 87 % (ref 36–65)
NEUTS SEG NFR BLD: 8.97 K/UL (ref 1.5–8.1)
NRBC BLD-RTO: 0 PER 100 WBC
PLATELET # BLD AUTO: 221 K/UL (ref 138–453)
PMV BLD AUTO: 9.6 FL (ref 8.1–13.5)
POTASSIUM SERPL-SCNC: 4.2 MMOL/L (ref 3.7–5.3)
POTASSIUM SERPL-SCNC: 4.4 MMOL/L (ref 3.7–5.3)
RBC # BLD AUTO: 3.37 M/UL (ref 3.95–5.11)
SODIUM SERPL-SCNC: 140 MMOL/L (ref 136–145)
SODIUM SERPL-SCNC: 141 MMOL/L (ref 136–145)
WBC OTHER # BLD: 10.3 K/UL (ref 3.5–11.3)

## 2024-12-20 PROCEDURE — 85025 COMPLETE CBC W/AUTO DIFF WBC: CPT

## 2024-12-20 PROCEDURE — 99232 SBSQ HOSP IP/OBS MODERATE 35: CPT | Performed by: FAMILY MEDICINE

## 2024-12-20 PROCEDURE — 99239 HOSP IP/OBS DSCHRG MGMT >30: CPT | Performed by: PSYCHIATRY & NEUROLOGY

## 2024-12-20 PROCEDURE — 6370000000 HC RX 637 (ALT 250 FOR IP)

## 2024-12-20 PROCEDURE — 97110 THERAPEUTIC EXERCISES: CPT

## 2024-12-20 PROCEDURE — 80048 BASIC METABOLIC PNL TOTAL CA: CPT

## 2024-12-20 PROCEDURE — 97530 THERAPEUTIC ACTIVITIES: CPT

## 2024-12-20 PROCEDURE — 6360000002 HC RX W HCPCS: Performed by: PSYCHIATRY & NEUROLOGY

## 2024-12-20 PROCEDURE — 36415 COLL VENOUS BLD VENIPUNCTURE: CPT

## 2024-12-20 PROCEDURE — 6360000002 HC RX W HCPCS

## 2024-12-20 PROCEDURE — 2500000003 HC RX 250 WO HCPCS

## 2024-12-20 PROCEDURE — 2580000003 HC RX 258: Performed by: INTERNAL MEDICINE

## 2024-12-20 PROCEDURE — 97535 SELF CARE MNGMENT TRAINING: CPT

## 2024-12-20 PROCEDURE — 6370000000 HC RX 637 (ALT 250 FOR IP): Performed by: FAMILY MEDICINE

## 2024-12-20 PROCEDURE — 6360000002 HC RX W HCPCS: Performed by: INTERNAL MEDICINE

## 2024-12-20 RX ORDER — AMLODIPINE BESYLATE 10 MG/1
10 TABLET ORAL DAILY
Qty: 30 TABLET | Refills: 3 | Status: SHIPPED | OUTPATIENT
Start: 2024-12-21

## 2024-12-20 RX ORDER — AMLODIPINE BESYLATE 10 MG/1
10 TABLET ORAL DAILY
Status: DISCONTINUED | OUTPATIENT
Start: 2024-12-21 | End: 2024-12-20 | Stop reason: HOSPADM

## 2024-12-20 RX ORDER — AMLODIPINE BESYLATE 10 MG/1
5 TABLET ORAL ONCE
Status: COMPLETED | OUTPATIENT
Start: 2024-12-20 | End: 2024-12-20

## 2024-12-20 RX ORDER — FERROUS SULFATE 325(65) MG
325 TABLET, DELAYED RELEASE (ENTERIC COATED) ORAL
Qty: 90 TABLET | Refills: 3 | Status: SHIPPED | OUTPATIENT
Start: 2024-12-21

## 2024-12-20 RX ADMIN — Medication 10 MG: at 07:48

## 2024-12-20 RX ADMIN — LEVOTHYROXINE SODIUM 50 MCG: 0.05 TABLET ORAL at 07:38

## 2024-12-20 RX ADMIN — LITHIUM CARBONATE 150 MG: 300 TABLET ORAL at 07:38

## 2024-12-20 RX ADMIN — FERROUS SULFATE TAB EC 325 MG (65 MG FE EQUIVALENT) 325 MG: 325 (65 FE) TABLET DELAYED RESPONSE at 07:38

## 2024-12-20 RX ADMIN — AMLODIPINE BESYLATE 5 MG: 10 TABLET ORAL at 07:36

## 2024-12-20 RX ADMIN — ALPRAZOLAM 0.25 MG: 0.25 TABLET ORAL at 07:47

## 2024-12-20 RX ADMIN — AMLODIPINE BESYLATE 5 MG: 10 TABLET ORAL at 12:34

## 2024-12-20 RX ADMIN — CETIRIZINE HYDROCHLORIDE 10 MG: 10 TABLET, FILM COATED ORAL at 07:39

## 2024-12-20 RX ADMIN — SODIUM CHLORIDE 200 MG: 9 INJECTION, SOLUTION INTRAVENOUS at 12:04

## 2024-12-20 RX ADMIN — SODIUM CHLORIDE: 9 INJECTION, SOLUTION INTRAVENOUS at 10:13

## 2024-12-20 RX ADMIN — SODIUM CHLORIDE, PRESERVATIVE FREE 10 ML: 5 INJECTION INTRAVENOUS at 07:39

## 2024-12-20 RX ADMIN — HEPARIN SODIUM 5000 UNITS: 5000 INJECTION INTRAVENOUS; SUBCUTANEOUS at 12:06

## 2024-12-20 RX ADMIN — FOLIC ACID 1 MG: 1 TABLET ORAL at 07:38

## 2024-12-20 RX ADMIN — CYANOCOBALAMIN 1000 MCG: 1000 INJECTION, SOLUTION INTRAMUSCULAR; SUBCUTANEOUS at 07:39

## 2024-12-20 ASSESSMENT — ENCOUNTER SYMPTOMS
SORE THROAT: 0
COUGH: 0
PHOTOPHOBIA: 0
BLOOD IN STOOL: 0
ABDOMINAL PAIN: 0
NAUSEA: 0
SHORTNESS OF BREATH: 0
CONSTIPATION: 0
CHEST TIGHTNESS: 0
VOMITING: 0
ABDOMINAL DISTENTION: 0
BACK PAIN: 0
CHOKING: 0
RHINORRHEA: 0
COLOR CHANGE: 0
DIARRHEA: 0
WHEEZING: 0

## 2024-12-20 ASSESSMENT — PAIN SCALES - GENERAL: PAINLEVEL_OUTOF10: 0

## 2024-12-20 NOTE — PROGRESS NOTES
Discharge instructions provided and reviewed with patient and daughter. Questions answered thoroughly. New prescription medications delivered by pharmacy. All medications and follow up care discussed. IV removed. Discharge paperwork and HAYES sent to [facility] and [address]. Patient discharged via wheelchair and transported to facility by family. All documented belongings sent with patient.

## 2024-12-20 NOTE — PROCEDURES
EEG REPORT       Patient: Anahi Mccarty Age: 89 y.o.  MRN: [id number]      Referring Provider: Kannan Silverio MD  Attending Physician:  Marcy Mcconnell DO      History: This is a routine scalp EEG recorded with time-locked video monitoring.  Patient is being evaluated for seizure disorder.    This EEG was performed to evaluate for focal and epileptiform abnormalities.       Anahi Mccarty   Current Facility-Administered Medications   Medication Dose Route Frequency Provider Last Rate Last Admin    labetalol (NORMODYNE;TRANDATE) injection 10 mg  10 mg IntraVENous Q2H PRN Wanye Rosas MD   10 mg at 12/20/24 0748    ferrous sulfate (FE TABS 325) EC tablet 325 mg  325 mg Oral Daily with breakfast Salome So MD   325 mg at 12/20/24 0738    cyanocobalamin injection 1,000 mcg  1,000 mcg IntraMUSCular Daily Marcy Mcconnell DO   1,000 mcg at 12/20/24 0739    butalbital-acetaminophen-caffeine (FIORICET, ESGIC) per tablet 1 tablet  1 tablet Oral Q6H PRN Salome So MD        folic acid (FOLVITE) tablet 1 mg  1 mg Oral Daily Salome So MD   1 mg at 12/20/24 0738    ALPRAZolam (XANAX) tablet 0.25 mg  0.25 mg Oral TID PRN Salome So MD   0.25 mg at 12/20/24 0747    [Held by provider] trospium (SANCTURA) tablet 20 mg  20 mg Oral BID AC Salome So MD        cetirizine (ZYRTEC) tablet 10 mg  10 mg Oral Daily Salome So MD   10 mg at 12/20/24 0739    Lithium 1/4 tablet = 75mg  150 mg Oral Daily Salome So MD   150 mg at 12/20/24 0738    iron sucrose (VENOFER) 200 mg in sodium chloride 0.9 % 100 mL IVPB  200 mg IntraVENous Q24H Bogdan Parikh DO   Stopped at 12/19/24 1601    amLODIPine (NORVASC) tablet 5 mg  5 mg Oral Daily Wayne Rosas MD   5 mg at 12/20/24 0736    0.9 % sodium chloride infusion   IntraVENous Continuous Bogdan Parikh DO 75 mL/hr at 12/19/24 1714 Rate Verify at 12/19/24 1714    donepezil (ARICEPT) tablet 10 mg  10 mg Oral Nightly Salome So,

## 2024-12-20 NOTE — PROGRESS NOTES
[facility] Neurology   IN-PATIENT SERVICE   [facility]    Progress Note             Date:   12/20/2024  Patient name:  Anahi Mccarty  Date of admission:  12/18/2024  4:27 PM  MRN:   [id number]  Account:  [id number]  YOB: 1930  PCP:    Kannan Silverio MD  Room:   [address] [place] [address]  Code Status:    Full Code    Chief Complaint:     Chief Complaint   Patient presents with    Head Laceration     Interval hx:     The patient was seen and examined at bedside.    Alert and oriented x 2. No acute events overnight.    This EEG was abnormal with a diffusely slow background suggestive of moderately severe encephalopathy. No focal or lateralized abnormalities noted. No epileptiform abnormalities were seen.     MRI brain Cerebral atrophy.  Moderate to severe chronic small vessel ischemic changes. Remote lacunar infarcts in the basal ganglia and thalami.  No acute brain parenchymal abnormality.     Creatinine 2.2>1.9>1.5  Still on fluids    BP around 140  Did receive PRN labetalol last night for sbp 180      Brief History of Present Illness:     The patient is a 89 y.o.   /  female with past medical history of HTN, hypothyroid, mood disorder, dementia, who presented to ED with fall on 12/18/24      Patient was in her usual state of health living in facility, using a rolling walker to ambulate until staff checked on her this morning and found a pool of blood in the room and apparently patient moved herself to the bathroom and was found to have blood covering her clothes with a laceration to the left side of her scalp, patient at that time did not seem to have any confusion, no clear weakness.  Daughter stated that she also noticed a bruise on her back last week, and seems like patient had an unwitnessed fall at that time.  She was having shortness of breath for the last 2 weeks mostly on excerption. No chest pain, palpitation, lightheadedness, or visual disturbance, no fever,       [facility]      Dispo - Facility once medically stable       Follow-up further recommendations after discussing case with the attending.  The plan was discussed with the patient, patient's family and the medical staff.   Consultations:   IP CONSULT TO INTERNAL MEDICINE    Patient is admitted as inpatient status because of co-morbidities listed above, severity of signs and symptoms as outlined, requirement for current medical therapies and most importantly because of direct risk to patient if care not provided in a hospital setting.    Salome So MD  Neurology Resident PGY-4   12/20/2024  8:11 AM    Copy sent to Dr. Silverio, Kannan CARTER MD

## 2024-12-20 NOTE — PLAN OF CARE
Problem: Chronic Conditions and Co-morbidities  Goal: Patient's chronic conditions and co-morbidity symptoms are monitored and maintained or improved  Outcome: Progressing  Flowsheets (Taken 12/20/2024 0752)  Care Plan - Patient's Chronic Conditions and Co-Morbidity Symptoms are Monitored and Maintained or Improved:   Monitor and assess patient's chronic conditions and comorbid symptoms for stability, deterioration, or improvement   Collaborate with multidisciplinary team to address chronic and comorbid conditions and prevent exacerbation or deterioration   Update acute care plan with appropriate goals if chronic or comorbid symptoms are exacerbated and prevent overall improvement and discharge     Problem: Discharge Planning  Goal: Discharge to home or other facility with appropriate resources  Outcome: Progressing  Flowsheets (Taken 12/20/2024 0752)  Discharge to home or other facility with appropriate resources:   Identify barriers to discharge with patient and caregiver   Arrange for needed discharge resources and transportation as appropriate   Identify discharge learning needs (meds, wound care, etc)   Arrange for interpreters to assist at discharge as needed   Refer to discharge planning if patient needs post-hospital services based on physician order or complex needs related to functional status, cognitive ability or social support system     Problem: Safety - Adult  Goal: Free from fall injury  Outcome: Progressing  Flowsheets (Taken 12/20/2024 0859)  Free From Fall Injury:   Instruct family/caregiver on patient safety   Based on caregiver fall risk screen, instruct family/caregiver to ask for assistance with transferring infant if caregiver noted to have fall risk factors     Problem: ABCDS Injury Assessment  Goal: Absence of physical injury  Outcome: Progressing  Flowsheets (Taken 12/20/2024 0859)  Absence of Physical Injury: Implement safety measures based on patient assessment

## 2024-12-20 NOTE — PLAN OF CARE
Problem: Chronic Conditions and Co-morbidities  Goal: Patient's chronic conditions and co-morbidity symptoms are monitored and maintained or improved  12/20/2024 1740 by Aby Mirza RN  Outcome: Adequate for Discharge  12/20/2024 1217 by Aby Mirza RN  Outcome: Progressing  Flowsheets (Taken 12/20/2024 0752)  Care Plan - Patient's Chronic Conditions and Co-Morbidity Symptoms are Monitored and Maintained or Improved:   Monitor and assess patient's chronic conditions and comorbid symptoms for stability, deterioration, or improvement   Collaborate with multidisciplinary team to address chronic and comorbid conditions and prevent exacerbation or deterioration   Update acute care plan with appropriate goals if chronic or comorbid symptoms are exacerbated and prevent overall improvement and discharge     Problem: Discharge Planning  Goal: Discharge to home or other facility with appropriate resources  12/20/2024 1740 by Aby Mirza RN  Outcome: Adequate for Discharge  12/20/2024 1217 by Aby Mirza RN  Outcome: Progressing  Flowsheets (Taken 12/20/2024 0752)  Discharge to home or other facility with appropriate resources:   Identify barriers to discharge with patient and caregiver   Arrange for needed discharge resources and transportation as appropriate   Identify discharge learning needs (meds, wound care, etc)   Arrange for interpreters to assist at discharge as needed   Refer to discharge planning if patient needs post-hospital services based on physician order or complex needs related to functional status, cognitive ability or social support system     Problem: Safety - Adult  Goal: Free from fall injury  12/20/2024 1740 by Aby Mirza, RN  Outcome: Adequate for Discharge  12/20/2024 1217 by Aby Mirza RN  Outcome: Progressing  Flowsheets (Taken 12/20/2024 0859)  Free From Fall Injury:   Instruct family/caregiver on patient safety   Based on caregiver fall risk screen, instruct family/caregiver

## 2024-12-20 NOTE — CARE COORDINATION
Transitional planning. Informed by pt's RN that plans are to dc pt after labs are resulted. Called Justine mesa/ [facility] [phone] and informed her of planned dc. Faxed AVS/HAYES to [phone]    Called [facility] , [phone], spoke to Margarette with [facility] on call, informed her of pt dc today, they will gather what they need in EPIC and call pt for start of care.     Family will transport pt back to facility.

## 2024-12-20 NOTE — PROGRESS NOTES
[facility]  Office: [phone]  Jose A Paul DO, Brian Crump DO, Samir Omer DO, Sergio Crystal DO, Camilo Rahman MD, Bianca Orozco MD, Esa Hughes MD, Meredith Dunlap MD,  David Ricketts MD, Arben Gonzalez MD, Dorina Chacko MD,  Bogdan Parikh DO, Isaiah Hall MD, Jonah Hanson MD, Yogesh Paul DO, Maria Teresa Gupta MD,  Theo Mccall DO, Savannah Crowell MD, Luly Espinoza MD, Suyapa Vlilanueva MD, Blanca Hess MD,  Madi Pak MD, Fred Delarosa MD, Jovanny Lafleur MD, Spenser Avelar MD, Quintin Aranda MD, Jacky Simon MD, Willy Davis DO, Kraig Bose MD, Shirley Waterhouse, CNP,  Lenore Armenta, CNP, Willy Piña, CNP,  Zoë Bhat, CHRIS, Eileen Temple, CNP, Gabriela Montiel, CNP, Janelle Hobbs, CNP, Bibi Allen, CNP, Emerald Conner PA-C, PARKER BoC, Marcie Stone, CNP, Sae Wen, CNP,  Mica Infante, CNP, Alanna Garcia, CNP,  Rubi Gtz, CNP, Demi Lerma, CNP         [facility]   IN-PATIENT SERVICE   [facility]    Progress Note    12/20/2024    12:29 PM    Name:   Anahi Mccarty  MRN:     [id number]     Acct:      [id number]   Room:   0119/0119-01   Day:  2  Admit Date:  12/18/2024  4:27 PM    PCP:   Kannan Silverio MD  Code Status:  Full Code    Subjective:     C/C:   Chief Complaint   Patient presents with    Head Laceration     Interval History Status: improved.     Patient seen and examined at bedside, no acute events overnight.  Alert , forgetful at times   Patient denies any chest pain, shortness of breath, chills, fevers, nausea or vomiting.  Her blood pressure still slightly elevated  Per report she ambulates to bathroom  Patient vitals, labs and all providers notes were reviewed,from overnight shift and morning updates were noted and discussed with the nurse      Brief History:         Review of Systems:     Review of Systems   Constitutional:  Positive for activity change, appetite change       General: She is not in acute distress.  HENT:      Head: Normocephalic and atraumatic.   Eyes:      Conjunctiva/sclera: Conjunctivae normal.      Pupils: Pupils are equal, round, and reactive to light.   Cardiovascular:      Rate and Rhythm: Normal rate and regular rhythm.      Heart sounds: No murmur heard.  Pulmonary:      Effort: Pulmonary effort is normal. No accessory muscle usage or respiratory distress.      Breath sounds: No stridor. No decreased breath sounds, wheezing, rhonchi or rales.   Abdominal:      General: Bowel sounds are normal. There is no distension.      Palpations: Abdomen is soft. Abdomen is not rigid.      Tenderness: There is no abdominal tenderness. There is no guarding.   Musculoskeletal:         General: No tenderness.   Skin:     General: Skin is warm and dry.      Findings: No erythema, lesion or rash.   Neurological:      Mental Status: She is alert and oriented to person, place, and time.      Cranial Nerves: No cranial nerve deficit.      Motor: No seizure activity.   Psychiatric:         Speech: Speech normal.         Behavior: Behavior normal. Behavior is cooperative.         Assessment:        Hospital Problems             Last Modified POA    * (Principal) Frequent falls 12/18/2024 Yes    Hypothyroidism 12/19/2024 Yes    Acute kidney injury (HCC) 12/19/2024 Yes    Fall 12/20/2024 Yes    Dementia (HCC) 12/19/2024 Yes    Asymptomatic hypertension 12/19/2024 Yes    Overview Signed 6/4/2024  4:07 PM by Kannan Silverio MD     Blood pressure is stable.  Lisinopril 5 mg daily and Lasix 40 mg daily.  Check CMP, CBC, TSH.         Bipolar 1 disorder (HCC) 12/19/2024 Yes    Overview Signed 6/4/2024  4:07 PM by Kannan Silverio MD     Lithium carbonate  mg 1/4 tablet daily and olanzapine/fluoxetine 6/25 at that time.  Prozac 20 mg daily.  Follows up with Dr. iHggins.  Lithium level         Head trauma 12/18/2024 Yes    Elevated creatine kinase 12/18/2024 Yes    Anemia 12/18/2024

## 2024-12-20 NOTE — PROGRESS NOTES
Physical Therapy  Facility/Department: [address] NEURO ICU  Physical Therapy Treatment Note    Name: Anahi Mccarty  : 1930  MRN: [id number]  Date of Service: 2024    Discharge Recommendations:  Patient would benefit from continued therapy after discharge   PT Equipment Recommendations  Equipment Needed: No      Patient Diagnosis(es): The primary encounter diagnosis was Fall, initial encounter. A diagnosis of Dehydration was also pertinent to this visit.  Past Medical History:  has a past medical history of EDUARDO (acute kidney injury) (HCC), Anxiety, Depression, Hypertensive emergency, Hypertensive heart disease with chronic combined systolic and diastolic congestive heart failure (HCC), and Thyroid disease.  Past Surgical History:  has no past surgical history on file.    Assessment  Body Structures, Functions, Activity Limitations Requiring Skilled Therapeutic Intervention: Decreased functional mobility ;Decreased safe awareness;Decreased cognition;Decreased balance  Assessment: Pt presenting with minimal mobility deficits requiring Jose to perform supine->sit transfer. Pt ambulated 55ft x 2 with CGA. Pt is most limited to balance and endurance. Pt would benefit from continued therapy to improve balance and tolerance to mobility facilitating safe return to home.  Therapy Prognosis: Good  Activity Tolerance  Activity Tolerance: Patient limited by endurance    Plan  Physical Therapy Plan  General Plan:  (6x/wk)  Current Treatment Recommendations: Strengthening, Balance training, Gait training, Functional mobility training, Transfer training, Endurance training, Home exercise program, Safety education & training, Patient/Caregiver education & training, Equipment evaluation, education, & procurement, Therapeutic activities  Safety Devices  Type of Devices: Call light within reach, Nurse notified, All fall risk precautions in place, Patient at risk for falls, Gait belt, Left in chair  Restraints  Restraints  Individual Concurrent Group Co-treatment   Time In 1456 ,1518         Time Out 1507, 1533         Minutes 11+15= 26         Timed Code Treatment Minutes: 23 Minutes       Michael Disla PTA

## 2024-12-20 NOTE — PROGRESS NOTES
Occupational Therapy  Occupational Therapy Daily Treatment Note  Facility/Department: [address] NEURO ICU   Patient Name: Anahi Mccarty        MRN: [id number]    : 1930    Date of Service: 2024    Chief Complaint   Patient presents with    Head Laceration     Past Medical History:  has a past medical history of EDUARDO (acute kidney injury) (HCC), Anxiety, Depression, Hypertensive emergency, Hypertensive heart disease with chronic combined systolic and diastolic congestive heart failure (HCC), and Thyroid disease.  Past Surgical History:  has no past surgical history on file.    Discharge Recommendations  Discharge Recommendations: Patient would benefit from continued therapy after discharge       Assessment  Performance deficits / Impairments: Decreased functional mobility ;Decreased ADL status;Decreased cognition;Decreased endurance  Prognosis: Good  Activity Tolerance  Activity Tolerance Comments: pt language barrier limits progresssing with other task, when asking pt to complete other tasks and ask questions, pt states \"no\" often.  Safety Devices  Type of Devices: Call light within reach;Nurse notified;All fall risk precautions in place;Patient at risk for falls;Gait belt;Left in chair  Restraints  Restraints Initially in Place: No    AM-PAC  AM-[facility] Daily Activity - Inpatient   How much help is needed for putting on and taking off regular lower body clothing?: A Lot  How much help is needed for bathing (which includes washing, rinsing, drying)?: A Lot  How much help is needed for toileting (which includes using toilet, bedpan, or urinal)?: A Lot  How much help is needed for putting on and taking off regular upper body clothing?: A Little  How much help is needed for taking care of personal grooming?: A Little  How much help for eating meals?: A Little  AM-[facility] Inpatient Daily Activity Raw Score: 15  AM-PAC Inpatient ADL T-Scale Score : 34.69  ADL Inpatient CMS 0-100% Score: 56.46  ADL Inpatient CMS  to ~35 degrees, Pt utilized log roll technique to complete HHA for trunk progression.    Transfers  Sit to stand: Contact guard assistance  Stand to sit: Contact guard assistance  Transfer Comments: with RW support from bedside, poor carryover for hand placement         Functional Mobility Skilled Clinical Factors: functional mobility with RW and CGA, assist for IV pole, intermittent cues for staying with RW. Pt completes short ADL distance from bedside to sink and back.       Patient Education  Patient Education  Education Given To: Patient  Education Provided: Plan of Care;ADL Adaptive Strategies;Transfer Training;Mobility Training;Fall Prevention Strategies  Education Method: Verbal  Barriers to Learning: Cognition  Education Outcome: Continued education needed    Goals  Short Term Goals  Time Frame for Short Term Goals: By discharge; Pt will  Short Term Goal 1: Complete UB ADL's SBA with VC's as needed for initation and sequencing of tasks  Short Term Goal 2: Complete toileting Min A with AE/DME/modified techniques as needed  Short Term Goal 3: Engage in bed mobility with Mod A to promote OOB activity  Short Term Goal 4: Complete functional transfers/mobility Min A with LRAD  Short Term Goal 5: Engage in meaningful activity 15+ mins to promote increased participation and activity tolerance  Short Term Goal 6: NOTIFY OTR TO UPDATE GOALS AS NEEDED    Plan  Occupational Therapy Plan  Times Per Week: 3-5x/week  Current Treatment Recommendations: Functional mobility training, Balance training, Cognitive reorientation, Safety education & training, Patient/Caregiver education & training, Pain management, Self-Care / ADL, Home management training, Equipment evaluation, education, & procurement    Minutes  OT Individual Minutes  Time In: 1455  Time Out: 1518  Minutes: 23  Time Code Minutes   Timed Code Treatment Minutes: 23 Minutes    Electronically signed by EMELY Travis on 12/20/24 at 4:26 PM EST

## 2024-12-20 NOTE — DISCHARGE INSTR - DIET

## 2024-12-21 NOTE — DISCHARGE SUMMARY
[facility]     Department of Neurology    INPATIENT DISCHARGE SUMMARY        Patient Identification:  Anahi Mccarty is a 89 y.o. female.  :  1930  MRN: [id number]     Acct: [id number]   Admit Date:  2024  Discharge date and time: 2024  6:30 PM   Attending Provider: No att. providers found                                     Admission Diagnoses:   Multiple falls [R29.6]    Discharge Diagnoses:   Principal Problem:    Frequent falls  Active Problems:    Hypothyroidism    Acute kidney injury (HCC)    Fall    Dementia (HCC)    Asymptomatic hypertension    Bipolar 1 disorder (HCC)    Head trauma    Elevated creatine kinase    Anemia    Elevated serum creatinine    B12 deficiency    Episodic confusion  Resolved Problems:    * No resolved hospital problems. *       Consults:   IM, PT OT, CM     Brief Inpatient course:    The patient is a 89 y.o.   /  female with past medical history of HTN, mood disorder, dementia, who presented to ED with fall today, unwitnessed fall last week. Patient was in her usual state of health living in facility, using a rolling walker to ambulate until staff checked on her this morning and found a pool of blood in the room and apparently patient moved herself to the bathroom and was found to have blood covering her clothes with a laceration to the left side of her scalp, patient at that time did not seem to have any confusion, no clear weakness. Daughter stated that she also noticed a bruise on her back last week, and seems like patient had an unwitnessed fall at that time. She was having shortness of breath for the last 2 weeks mostly on excerption. On presentation she her blood pressure was 160s, otherwise vitally stable, laying comfortable, drowsy and pale looking. Hgb is 8.3. Cr 2.2, BUN 44. Renal US consistent with chronic kidney disease. CT head was negative for acute abnormalities. CTA H&N was negative for LVO or critical  stenosis. CT neck showed multilevel degenerative and degenerative disc changes. Little change from prior exam. She does take lithium 75 mg daily for mood disorder and levels were 0.4. MRI brain was negative for acute, mild microvascular ischemic disease and brain atrophy. EEG was showing generalized slowing but epileptiform discharges.      Impression: dehydration, prerenal azotemia, EDUARDO, Iron deficiency anemia, B-12 deficiency, headaches, hypertension.       Recurrent falls/gait apraxia - PT OT, to be continued on DC  Headaches -  tylenol PRN  Iron deficiency anemia - Iron supplements  Vitamin B 12 deficiency - Vit B-12 supplements   Prerenal azotemia/ EDUARDO - Hold lasix and lisinopril, IVF, Cr 2.2>1.9>1.5> (1.3 prior to discharge), Renal US consistent with CKD?   HTN - home meds changed to Norvasc 10 mg daily       Patient is stable from neurological standpoint to be discharged.    Procedures:  Renal US, EEG     Any Hospital Acquired Infections: none    Discharge Functional Status:  stable    Disposition: SNF    Patient Instructions:   Follow with PCP for anemia, B-12 deficiency, EDUARDO, blood pressure management       Discharge Medications:  Discharge Medication List as of 12/20/2024  5:14 PM        START taking these medications    Details   cyanocobalamin ([employer] VITAMIN B12) 1000 MCG tablet Take 1 tablet by mouth daily, Disp-30 tablet, R-3Normal      ferrous sulfate (FE TABS 325) 325 (65 Fe) MG EC tablet Take 1 tablet by mouth daily (with breakfast), Disp-90 tablet, R-3Normal      amLODIPine (NORVASC) 10 MG tablet Take 1 tablet by mouth daily, Disp-30 tablet, R-3Normal           CONTINUE these medications which have NOT CHANGED    Details   levothyroxine (SYNTHROID) 50 MCG tablet Take 1 tablet by mouth daily, Disp-90 tablet, R-3Normal      fesoterodine 4 MG TB24 Take 1 tablet by mouth daily      fexofenadine (ALLEGRA) 180 MG tablet Take 1 tablet by mouth dailyHistorical Med      donepezil (ARICEPT) 10 MG tablet Take 1

## 2024-12-21 NOTE — PROGRESS NOTES
CLINICAL PHARMACY NOTE: MEDS TO BEDS    Total # of Prescriptions Filled: 2   The following medications were delivered to the patient:  Iron 325mg  Vitamin b-12 1000mcg    Additional Documentation: delivered 12/20 at 5:55pm.

## 2024-12-23 ENCOUNTER — TELEPHONE (OUTPATIENT)
Age: 89
End: 2024-12-23

## 2024-12-23 RX ORDER — POLYETHYLENE GLYCOL 3350 17 G/17G
17 POWDER, FOR SOLUTION ORAL DAILY
Qty: 238 G | Refills: 3 | Status: SHIPPED | OUTPATIENT
Start: 2024-12-23 | End: 2025-01-22

## 2024-12-23 NOTE — TELEPHONE ENCOUNTER
[facility] is requesting medication sent to the [facility] on [address] for patients constipation.

## 2024-12-23 NOTE — TELEPHONE ENCOUNTER
Anahi was admitted to [facility] on 12/18/24 for frequent falls, she was discharged on 12/20/24.   Please contact Anahi's daughter to initiate care transition and to schedule a hospital follow up appointment.

## 2024-12-23 NOTE — TELEPHONE ENCOUNTER
Care Transitions Initial Follow Up Call    Outreach made within 2 business days of discharge: Yes    Patient: Anahi Mccarty Patient : 1930   MRN: [id number]  Reason for Admission: falls  Discharge Date: 24       Spoke with: patients daughter Tommy    Discharge department/facility: [facility] Interactive Patient Contact:  Was patient able to fill all prescriptions: Yes  Was patient instructed to bring all medications to the follow-up visit: Yes  Is patient taking all medications as directed in the discharge summary? Yes  Does patient understand their discharge instructions: Yes  Does patient have questions or concerns that need addressed prior to 7-14 day follow up office visit: no    Additional needs identified to be addressed with provider  No needs identified             Scheduled appointment with PCP within 7-14 days    Follow Up  Future Appointments   Date Time Provider Department Center   2025  9:00 AM Kannan Silverio MD [facility] DEP       Dorene Lakhani MA

## 2024-12-24 LAB
EKG ATRIAL RATE: 66 BPM
EKG P AXIS: 70 DEGREES
EKG P-R INTERVAL: 166 MS
EKG Q-T INTERVAL: 462 MS
EKG QRS DURATION: 84 MS
EKG QTC CALCULATION (BAZETT): 484 MS
EKG R AXIS: 26 DEGREES
EKG T AXIS: 41 DEGREES
EKG VENTRICULAR RATE: 66 BPM

## 2024-12-24 PROCEDURE — 93010 ELECTROCARDIOGRAM REPORT: CPT | Performed by: INTERNAL MEDICINE

## 2025-01-03 ENCOUNTER — OFFICE VISIT (OUTPATIENT)
Age: 89
End: 2025-01-03

## 2025-01-03 VITALS
SYSTOLIC BLOOD PRESSURE: 110 MMHG | HEIGHT: 59 IN | TEMPERATURE: 96.8 F | HEART RATE: 79 BPM | WEIGHT: 143 LBS | BODY MASS INDEX: 28.83 KG/M2 | DIASTOLIC BLOOD PRESSURE: 60 MMHG | OXYGEN SATURATION: 98 %

## 2025-01-03 DIAGNOSIS — F31.9 BIPOLAR 1 DISORDER (HCC): ICD-10-CM

## 2025-01-03 DIAGNOSIS — G30.1 ALZHEIMER'S DISEASE WITH LATE ONSET (CODE) (HCC): ICD-10-CM

## 2025-01-03 DIAGNOSIS — F02.B0 DEMENTIA IN OTHER DISEASES CLASSIFIED ELSEWHERE, MODERATE, WITHOUT BEHAVIORAL DISTURBANCE, PSYCHOTIC DISTURBANCE, MOOD DISTURBANCE, AND ANXIETY (HCC): ICD-10-CM

## 2025-01-03 DIAGNOSIS — R60.0 LEG EDEMA: ICD-10-CM

## 2025-01-03 DIAGNOSIS — F31.31 BIPOLAR AFFECTIVE DISORDER, CURRENTLY DEPRESSED, MILD (HCC): ICD-10-CM

## 2025-01-03 DIAGNOSIS — I50.42 CHRONIC COMBINED SYSTOLIC AND DIASTOLIC CONGESTIVE HEART FAILURE (HCC): ICD-10-CM

## 2025-01-03 DIAGNOSIS — E03.8 OTHER SPECIFIED HYPOTHYROIDISM: ICD-10-CM

## 2025-01-03 DIAGNOSIS — Z91.81 AT HIGH RISK FOR FALLS: ICD-10-CM

## 2025-01-03 DIAGNOSIS — I11.0 HYPERTENSIVE HEART DISEASE WITH HEART FAILURE (HCC): ICD-10-CM

## 2025-01-03 DIAGNOSIS — D50.9 IRON DEFICIENCY ANEMIA, UNSPECIFIED IRON DEFICIENCY ANEMIA TYPE: ICD-10-CM

## 2025-01-03 DIAGNOSIS — Z09 HOSPITAL DISCHARGE FOLLOW-UP: Primary | ICD-10-CM

## 2025-01-03 DIAGNOSIS — N17.9 ACUTE KIDNEY INJURY (HCC): ICD-10-CM

## 2025-01-03 DIAGNOSIS — E53.8 VITAMIN B12 DEFICIENCY: ICD-10-CM

## 2025-01-03 DIAGNOSIS — I10 ESSENTIAL HYPERTENSION: ICD-10-CM

## 2025-01-03 RX ORDER — AMLODIPINE BESYLATE 5 MG/1
5 TABLET ORAL DAILY
Qty: 1 TABLET | Refills: 0
Start: 2025-01-03

## 2025-01-03 RX ORDER — SOLIFENACIN SUCCINATE 5 MG/1
5 TABLET, FILM COATED ORAL DAILY
COMMUNITY

## 2025-01-03 RX ORDER — FUROSEMIDE 20 MG/1
20 TABLET ORAL DAILY
Qty: 30 TABLET | Refills: 5 | Status: SHIPPED | OUTPATIENT
Start: 2025-01-03

## 2025-01-03 SDOH — ECONOMIC STABILITY: INCOME INSECURITY: HOW HARD IS IT FOR YOU TO PAY FOR THE VERY BASICS LIKE FOOD, HOUSING, MEDICAL CARE, AND HEATING?: NOT VERY HARD

## 2025-01-03 SDOH — ECONOMIC STABILITY: FOOD INSECURITY: WITHIN THE PAST 12 MONTHS, THE FOOD YOU BOUGHT JUST DIDN'T LAST AND YOU DIDN'T HAVE MONEY TO GET MORE.: NEVER TRUE

## 2025-01-03 SDOH — ECONOMIC STABILITY: FOOD INSECURITY: WITHIN THE PAST 12 MONTHS, YOU WORRIED THAT YOUR FOOD WOULD RUN OUT BEFORE YOU GOT MONEY TO BUY MORE.: NEVER TRUE

## 2025-01-03 ASSESSMENT — ENCOUNTER SYMPTOMS
EYE PAIN: 0
COUGH: 0
ABDOMINAL PAIN: 0
RHINORRHEA: 0
NAUSEA: 0
CONSTIPATION: 0
DIARRHEA: 0
BACK PAIN: 0
SINUS PAIN: 0
SHORTNESS OF BREATH: 0
EYE REDNESS: 0
WHEEZING: 0
BLOOD IN STOOL: 0
VOMITING: 0
SORE THROAT: 0
SINUS PRESSURE: 0

## 2025-01-03 NOTE — PROGRESS NOTES
Post-Discharge Transitional Care  Follow Up      Anahi Mccarty   YOB: 1930    Date of Office Visit:  1/3/2025  Date of Hospital Admission: 12/18/24  Date of Hospital Discharge: 12/20/24  Risk of hospital readmission (high >=14%. Medium >=10%) :Readmission Risk Score: 16.3      Care management risk score Rising risk (score 2-5) and Complex Care (Scores >=6): No Risk Score On File     Non face to face  following discharge, date last encounter closed (first attempt may have been earlier): 12/23/2024    Call initiated 2 business days of discharge: Yes    ASSESSMENT/PLAN:   Hospital discharge follow-up  -     [place] DISCHARGE MEDS RECONCILED W/ CURRENT OUTPATIENT MED LIST  Patient is accompanied by her daughter who provides most of the history.  Hospital report reviewed.  Patient was admitted for significant fall with head injury that did not require sutures or staples.  She does not have anemia and acute kidney disease while in the hospital.  She was started on iron and vitamin B12 on discharge.  Also her lisinopril and Lasix were stopped and amlodipine 10 mg daily was added.  Since discharge from the hospital she has had increased edema in the legs.  At high risk for falls  Patient lives at [facility] on [address].  She has a walker.  Alzheimer's disease with late onset (CODE) (HCC)  Patient is on medications per her psychiatrist including Aricept 10 mg daily  Dementia in other diseases classified elsewhere, moderate, without behavioral disturbance, psychotic disturbance, mood disturbance, and anxiety (HCC)  Medications per psychiatrist.  Stable.  Bipolar 1 disorder (HCC)  Stable on medications through psychiatrist including symbyax, lithium, fluoxetine, Aricept, Xanax.  Patient has not had these medicines changed recently.  Bipolar affective disorder, currently depressed, mild (HCC)  Essential hypertension  -     amLODIPine (NORVASC) 5 MG tablet; Take 1 tablet by mouth daily, Disp-1 tablet,
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
## 2025-01-27 DIAGNOSIS — N17.9 ACUTE KIDNEY INJURY (HCC): ICD-10-CM

## 2025-02-11 ENCOUNTER — OFFICE VISIT (OUTPATIENT)
Age: 89
End: 2025-02-11

## 2025-02-11 VITALS
HEIGHT: 59 IN | OXYGEN SATURATION: 97 % | DIASTOLIC BLOOD PRESSURE: 78 MMHG | WEIGHT: 134 LBS | HEART RATE: 67 BPM | BODY MASS INDEX: 27.01 KG/M2 | TEMPERATURE: 96.9 F | SYSTOLIC BLOOD PRESSURE: 132 MMHG

## 2025-02-11 DIAGNOSIS — G30.1 ALZHEIMER'S DISEASE WITH LATE ONSET (CODE) (HCC): ICD-10-CM

## 2025-02-11 DIAGNOSIS — F31.9 BIPOLAR 1 DISORDER (HCC): ICD-10-CM

## 2025-02-11 DIAGNOSIS — N18.32 CKD STAGE 3B, GFR 30-44 ML/MIN (HCC): ICD-10-CM

## 2025-02-11 DIAGNOSIS — S51.812A SKIN TEAR OF LEFT FOREARM WITHOUT COMPLICATION, INITIAL ENCOUNTER: ICD-10-CM

## 2025-02-11 DIAGNOSIS — Z71.89 ACP (ADVANCE CARE PLANNING): ICD-10-CM

## 2025-02-11 DIAGNOSIS — I10 ESSENTIAL HYPERTENSION: ICD-10-CM

## 2025-02-11 DIAGNOSIS — R60.0 LOCALIZED EDEMA: Primary | ICD-10-CM

## 2025-02-11 RX ORDER — FEXOFENADINE HCL AND PSEUDOEPHEDRINE HCL 180; 240 MG/1; MG/1
1 TABLET, EXTENDED RELEASE ORAL
COMMUNITY
End: 2025-02-11 | Stop reason: CLARIF

## 2025-02-11 SDOH — ECONOMIC STABILITY: FOOD INSECURITY: WITHIN THE PAST 12 MONTHS, THE FOOD YOU BOUGHT JUST DIDN'T LAST AND YOU DIDN'T HAVE MONEY TO GET MORE.: NEVER TRUE

## 2025-02-11 SDOH — ECONOMIC STABILITY: FOOD INSECURITY: WITHIN THE PAST 12 MONTHS, YOU WORRIED THAT YOUR FOOD WOULD RUN OUT BEFORE YOU GOT MONEY TO BUY MORE.: NEVER TRUE

## 2025-02-11 ASSESSMENT — PATIENT HEALTH QUESTIONNAIRE - PHQ9
SUM OF ALL RESPONSES TO PHQ9 QUESTIONS 1 & 2: 1
9. THOUGHTS THAT YOU WOULD BE BETTER OFF DEAD, OR OF HURTING YOURSELF: NOT AT ALL
SUM OF ALL RESPONSES TO PHQ QUESTIONS 1-9: 4
SUM OF ALL RESPONSES TO PHQ QUESTIONS 1-9: 4
5. POOR APPETITE OR OVEREATING: SEVERAL DAYS
SUM OF ALL RESPONSES TO PHQ QUESTIONS 1-9: 4
2. FEELING DOWN, DEPRESSED OR HOPELESS: NOT AT ALL
7. TROUBLE CONCENTRATING ON THINGS, SUCH AS READING THE NEWSPAPER OR WATCHING TELEVISION: NOT AT ALL
1. LITTLE INTEREST OR PLEASURE IN DOING THINGS: SEVERAL DAYS
10. IF YOU CHECKED OFF ANY PROBLEMS, HOW DIFFICULT HAVE THESE PROBLEMS MADE IT FOR YOU TO DO YOUR WORK, TAKE CARE OF THINGS AT HOME, OR GET ALONG WITH OTHER PEOPLE: NOT DIFFICULT AT ALL
SUM OF ALL RESPONSES TO PHQ QUESTIONS 1-9: 4
4. FEELING TIRED OR HAVING LITTLE ENERGY: SEVERAL DAYS
8. MOVING OR SPEAKING SO SLOWLY THAT OTHER PEOPLE COULD HAVE NOTICED. OR THE OPPOSITE, BEING SO FIGETY OR RESTLESS THAT YOU HAVE BEEN MOVING AROUND A LOT MORE THAN USUAL: NOT AT ALL
6. FEELING BAD ABOUT YOURSELF - OR THAT YOU ARE A FAILURE OR HAVE LET YOURSELF OR YOUR FAMILY DOWN: NOT AT ALL
3. TROUBLE FALLING OR STAYING ASLEEP: SEVERAL DAYS

## 2025-02-11 NOTE — PATIENT INSTRUCTIONS

## 2025-02-11 NOTE — PROGRESS NOTES
[facility]     Date of Visit:  2025  Patient Name: Anahi Mccarty   Patient :  1930     CHIEF COMPLAINT:     Anahi Mccarty is a 89 y.o. female who presents today for an general visit to be evaluated for the following condition(s):  Chief Complaint   Patient presents with    Leg Swelling     Bilateral.  Follow up, due to B/P medication       HISTORY OF PRESENT ILLNESS      She noted a skin tear along the left hand just above the wrist today.  On Lasix 40 mg daily.  Significant improvement in the swelling of her legs.  GFR improved to 34.5.  Potassium 3.5.  Sodium normal.  Ambulates with a rolling walker.  Using sequential ace wrap. At [facility] eating 3 meals a day in the communal area. Depression issues. Stays in  bed if no one gets her out.     REVIEW OF SYSTEMS     Review of Systems     REVIEWED INFORMATION      Current Outpatient Medications   Medication Sig Dispense Refill    solifenacin (VESICARE) 5 MG tablet Take 1 tablet by mouth daily      furosemide (LASIX) 20 MG tablet Take 1 tablet by mouth daily (Patient taking differently: Take 2 tablets by mouth daily) 30 tablet 5    cyanocobalamin ([employer] VITAMIN B12) 1000 MCG tablet Take 1 tablet by mouth daily 30 tablet 3    ferrous sulfate (FE TABS 325) 325 (65 Fe) MG EC tablet Take 1 tablet by mouth daily (with breakfast) 90 tablet 3    levothyroxine (SYNTHROID) 50 MCG tablet Take 1 tablet by mouth daily 90 tablet 3    fexofenadine (ALLEGRA) 180 MG tablet Take 1 tablet by mouth daily      donepezil (ARICEPT) 10 MG tablet Take 1 tablet by mouth nightly      lithium (LITHOBID) 300 MG CR tablet 1/4 of a tablet daily       FLUoxetine (PROZAC) 20 MG capsule Take 1 capsule by mouth daily      ALPRAZolam (XANAX) 0.25 MG tablet Take 1 tablet by mouth 3 times daily as needed for Sleep (0.5 to 1 mg at night for sleep prn).      OLANZapine-FLUoxetine (SYMBYAX) 6-25 MG CAPS capsule Take 1 capsule by mouth nightly       No current

## 2025-02-12 ENCOUNTER — TELEPHONE (OUTPATIENT)
Age: 89
End: 2025-02-12

## 2025-02-12 NOTE — TELEPHONE ENCOUNTER
[facility] the patient having chest tightness and she is over anxious.  /65.    I spoke with Dr Silverio who said they can give the patient an extra xanax.  If that does not help, call 911.

## 2025-02-28 ENCOUNTER — TELEPHONE (OUTPATIENT)
Age: 89
End: 2025-02-28

## 2025-02-28 DIAGNOSIS — N30.90 CYSTITIS: Primary | ICD-10-CM

## 2025-02-28 NOTE — TELEPHONE ENCOUNTER
Justine from [facility] would like for us to order a UA with Culture and Sensitivity.Patient is more anxious and confused than normal. Would like to rule out a UTI. Please send order to fax number [phone] when ready.

## 2025-03-07 ENCOUNTER — TELEPHONE (OUTPATIENT)
Age: 89
End: 2025-03-07

## 2025-03-07 NOTE — TELEPHONE ENCOUNTER
Justine from [facility] called, they are not allowed to give patient her gummies with THC anymore. They need a discontinued order faxed to [phone].

## 2025-03-19 ENCOUNTER — TELEPHONE (OUTPATIENT)
Age: 89
End: 2025-03-19

## 2025-03-19 ENCOUNTER — APPOINTMENT (OUTPATIENT)
Dept: GENERAL RADIOLOGY | Age: 89
DRG: 193 | End: 2025-03-19
Payer: MEDICARE

## 2025-03-19 ENCOUNTER — HOSPITAL ENCOUNTER (INPATIENT)
Age: 89
LOS: 3 days | Discharge: NURSING, REHAB OR LONG-TERM CARE | DRG: 193 | End: 2025-03-23
Attending: EMERGENCY MEDICINE | Admitting: INTERNAL MEDICINE
Payer: MEDICARE

## 2025-03-19 ENCOUNTER — APPOINTMENT (OUTPATIENT)
Dept: CT IMAGING | Age: 89
DRG: 193 | End: 2025-03-19
Payer: MEDICARE

## 2025-03-19 DIAGNOSIS — R07.89 CHEST WALL PAIN: Primary | ICD-10-CM

## 2025-03-19 DIAGNOSIS — J18.9 PNEUMONIA OF BOTH LOWER LOBES DUE TO INFECTIOUS ORGANISM: ICD-10-CM
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
## 2025-03-19 LAB
ALBUMIN SERPL-MCNC: 3 G/DL (ref 3.5–5.2)
ALBUMIN/GLOB SERPL: 1 {RATIO} (ref 1–2.5)
ALP SERPL-CCNC: 125 U/L (ref 35–104)
ALT SERPL-CCNC: 7 U/L (ref 10–35)
ANION GAP SERPL CALCULATED.3IONS-SCNC: 10 MMOL/L (ref 9–16)
AST SERPL-CCNC: 15 U/L (ref 10–35)
B PARAP IS1001 DNA NPH QL NAA+NON-PROBE: NOT DETECTED
B PERT DNA SPEC QL NAA+PROBE: NOT DETECTED
BASOPHILS # BLD: 0.04 K/UL (ref 0–0.2)
BASOPHILS NFR BLD: 0 % (ref 0–2)
BILIRUB SERPL-MCNC: 0.3 MG/DL (ref 0–1.2)
BUN SERPL-MCNC: 22 MG/DL (ref 8–23)
C PNEUM DNA NPH QL NAA+NON-PROBE: NOT DETECTED
CALCIUM SERPL-MCNC: 8.4 MG/DL (ref 8.6–10.4)
CHLORIDE SERPL-SCNC: 104 MMOL/L (ref 98–107)
CO2 SERPL-SCNC: 26 MMOL/L (ref 20–31)
CREAT SERPL-MCNC: 1.7 MG/DL (ref 0.6–0.9)
EOSINOPHIL # BLD: 0.26 K/UL (ref 0–0.44)
EOSINOPHILS RELATIVE PERCENT: 2 % (ref 1–4)
ERYTHROCYTE [DISTWIDTH] IN BLOOD BY AUTOMATED COUNT: 15.1 % (ref 11.8–14.4)
FLUAV AG SPEC QL: NEGATIVE
FLUAV RNA NPH QL NAA+NON-PROBE: NOT DETECTED
FLUBV AG SPEC QL: NEGATIVE
FLUBV RNA NPH QL NAA+NON-PROBE: NOT DETECTED
GFR, ESTIMATED: 28 ML/MIN/1.73M2
GLUCOSE SERPL-MCNC: 129 MG/DL (ref 74–99)
HADV DNA NPH QL NAA+NON-PROBE: NOT DETECTED
HCOV 229E RNA NPH QL NAA+NON-PROBE: NOT DETECTED
HCOV HKU1 RNA NPH QL NAA+NON-PROBE: NOT DETECTED
HCOV NL63 RNA NPH QL NAA+NON-PROBE: NOT DETECTED
HCOV OC43 RNA NPH QL NAA+NON-PROBE: NOT DETECTED
HCT VFR BLD AUTO: 29.1 % (ref 36.3–47.1)
HGB BLD-MCNC: 9.2 G/DL (ref 11.9–15.1)
HMPV RNA NPH QL NAA+NON-PROBE: NOT DETECTED
HPIV1 RNA NPH QL NAA+NON-PROBE: NOT DETECTED
HPIV2 RNA NPH QL NAA+NON-PROBE: NOT DETECTED
HPIV3 RNA NPH QL NAA+NON-PROBE: NOT DETECTED
HPIV4 RNA NPH QL NAA+NON-PROBE: NOT DETECTED
IMM GRANULOCYTES # BLD AUTO: 0.08 K/UL (ref 0–0.3)
IMM GRANULOCYTES NFR BLD: 1 %
LIPASE SERPL-CCNC: 55 U/L (ref 13–60)
LYMPHOCYTES NFR BLD: 1.94 K/UL (ref 1.1–3.7)
LYMPHOCYTES RELATIVE PERCENT: 14 % (ref 24–43)
M PNEUMO DNA NPH QL NAA+NON-PROBE: NOT DETECTED
MCH RBC QN AUTO: 28 PG (ref 25.2–33.5)
MCHC RBC AUTO-ENTMCNC: 31.6 G/DL (ref 28.4–34.8)
MCV RBC AUTO: 88.4 FL (ref 82.6–102.9)
MONOCYTES NFR BLD: 0.65 K/UL (ref 0.1–1.2)
MONOCYTES NFR BLD: 5 % (ref 3–12)
NEUTROPHILS NFR BLD: 78 % (ref 36–65)
NEUTS SEG NFR BLD: 10.58 K/UL (ref 1.5–8.1)
NRBC BLD-RTO: 0 PER 100 WBC
PLATELET # BLD AUTO: 255 K/UL (ref 138–453)
PMV BLD AUTO: 8.7 FL (ref 8.1–13.5)
POTASSIUM SERPL-SCNC: 3.3 MMOL/L (ref 3.7–5.3)
PROT SERPL-MCNC: 5.9 G/DL (ref 6.6–8.7)
RBC # BLD AUTO: 3.29 M/UL (ref 3.95–5.11)
RBC # BLD: ABNORMAL 10*6/UL
RSV RNA NPH QL NAA+NON-PROBE: NOT DETECTED
RV+EV RNA NPH QL NAA+NON-PROBE: NOT DETECTED
SARS-COV-2 RDRP RESP QL NAA+PROBE: NOT DETECTED
SARS-COV-2 RNA NPH QL NAA+NON-PROBE: NOT DETECTED
SODIUM SERPL-SCNC: 140 MMOL/L (ref 136–145)
SPECIMEN DESCRIPTION: NORMAL
SPECIMEN DESCRIPTION: NORMAL
TROPONIN I SERPL HS-MCNC: 31 NG/L (ref 0–14)
TROPONIN I SERPL HS-MCNC: 38 NG/L (ref 0–14)
WBC OTHER # BLD: 13.6 K/UL (ref 3.5–11.3)

## 2025-03-19 PROCEDURE — 85025 COMPLETE CBC W/AUTO DIFF WBC: CPT

## 2025-03-19 PROCEDURE — 83690 ASSAY OF LIPASE: CPT

## 2025-03-19 PROCEDURE — 70450 CT HEAD/BRAIN W/O DYE: CPT

## 2025-03-19 PROCEDURE — 72125 CT NECK SPINE W/O DYE: CPT

## 2025-03-19 PROCEDURE — 87804 INFLUENZA ASSAY W/OPTIC: CPT

## 2025-03-19 PROCEDURE — 6370000000 HC RX 637 (ALT 250 FOR IP)

## 2025-03-19 PROCEDURE — 99285 EMERGENCY DEPT VISIT HI MDM: CPT

## 2025-03-19 PROCEDURE — 87635 SARS-COV-2 COVID-19 AMP PRB: CPT

## 2025-03-19 PROCEDURE — 71250 CT THORAX DX C-: CPT

## 2025-03-19 PROCEDURE — 71045 X-RAY EXAM CHEST 1 VIEW: CPT

## 2025-03-19 PROCEDURE — 84484 ASSAY OF TROPONIN QUANT: CPT

## 2025-03-19 PROCEDURE — 0202U NFCT DS 22 TRGT SARS-COV-2: CPT

## 2025-03-19 PROCEDURE — 80053 COMPREHEN METABOLIC PANEL: CPT

## 2025-03-19 PROCEDURE — 93005 ELECTROCARDIOGRAM TRACING: CPT

## 2025-03-19 RX ORDER — IOPAMIDOL 755 MG/ML
75 INJECTION, SOLUTION INTRAVASCULAR
Status: DISCONTINUED | OUTPATIENT
Start: 2025-03-19 | End: 2025-03-19

## 2025-03-19 RX ORDER — ACETAMINOPHEN 325 MG/1
650 TABLET ORAL ONCE
Status: COMPLETED | OUTPATIENT
Start: 2025-03-19 | End: 2025-03-19

## 2025-03-19 RX ADMIN — ACETAMINOPHEN 650 MG: 325 TABLET ORAL at 21:58

## 2025-03-19 ASSESSMENT — LIFESTYLE VARIABLES: HOW OFTEN DO YOU HAVE A DRINK CONTAINING ALCOHOL: NEVER

## 2025-03-19 NOTE — TELEPHONE ENCOUNTER
Kira russell Freed[facility] wanted to inform us that patient was going to lunch and she fell. Patient was using her walker and was moving to fast. No injury.    Kira also stated that [facility]. She will get more information from the daughter, since there were no medications ordered on that day.

## 2025-03-20 ENCOUNTER — APPOINTMENT (OUTPATIENT)
Dept: GENERAL RADIOLOGY | Age: 89
DRG: 193 | End: 2025-03-20
Payer: MEDICARE

## 2025-03-20 PROBLEM — N28.9 ACUTE RENAL INSUFFICIENCY: Status: ACTIVE | Noted: 2025-03-20

## 2025-03-20 PROBLEM — J18.9 MULTIFOCAL PNEUMONIA: Status: ACTIVE | Noted: 2025-03-20

## 2025-03-20 PROBLEM — H70.92 MASTOIDITIS OF LEFT SIDE: Status: ACTIVE | Noted: 2025-03-20

## 2025-03-20 PROBLEM — H70.002 ACUTE MASTOIDITIS OF LEFT SIDE: Status: ACTIVE | Noted: 2025-03-20

## 2025-03-20 PROBLEM — J32.0 LEFT MAXILLARY SINUSITIS: Status: ACTIVE | Noted: 2025-03-20

## 2025-03-20 LAB
BILIRUB UR QL STRIP: NEGATIVE
BNP SERPL-MCNC: 2489 PG/ML (ref 0–450)
CLARITY UR: CLEAR
COLOR UR: YELLOW
COMMENT: NORMAL
FERRITIN SERPL-MCNC: 190 NG/ML
FOLATE SERPL-MCNC: 8.9 NG/ML (ref 4.8–24.2)
GLUCOSE UR STRIP-MCNC: NEGATIVE MG/DL
HGB UR QL STRIP.AUTO: NEGATIVE
KETONES UR STRIP-MCNC: NEGATIVE MG/DL
L PNEUMO1 AG UR QL IA.RAPID: NEGATIVE
LEUKOCYTE ESTERASE UR QL STRIP: NEGATIVE
NITRITE UR QL STRIP: NEGATIVE
PH UR STRIP: 7.5 [PH] (ref 5–8)
PROCALCITONIN SERPL-MCNC: 2.88 NG/ML (ref 0–0.09)
PROT UR STRIP-MCNC: NEGATIVE MG/DL
S PNEUM AG SPEC QL: POSITIVE
SP GR UR STRIP: 1.01 (ref 1–1.03)
SPECIMEN SOURCE: NORMAL
UROBILINOGEN UR STRIP-ACNC: NORMAL EU/DL (ref 0–1)
VIT B12 SERPL-MCNC: 1374 PG/ML (ref 232–1245)

## 2025-03-20 PROCEDURE — 83880 ASSAY OF NATRIURETIC PEPTIDE: CPT

## 2025-03-20 PROCEDURE — 84145 PROCALCITONIN (PCT): CPT

## 2025-03-20 PROCEDURE — 2T015 HOSPITALIST 2ND TOUCH: CPT | Performed by: FAMILY MEDICINE

## 2025-03-20 PROCEDURE — 2580000003 HC RX 258

## 2025-03-20 PROCEDURE — 2500000003 HC RX 250 WO HCPCS: Performed by: NURSE PRACTITIONER

## 2025-03-20 PROCEDURE — 6370000000 HC RX 637 (ALT 250 FOR IP): Performed by: NURSE PRACTITIONER

## 2025-03-20 PROCEDURE — 6360000002 HC RX W HCPCS

## 2025-03-20 PROCEDURE — 6360000002 HC RX W HCPCS: Performed by: FAMILY MEDICINE

## 2025-03-20 PROCEDURE — 82607 VITAMIN B-12: CPT

## 2025-03-20 PROCEDURE — 82746 ASSAY OF FOLIC ACID SERUM: CPT

## 2025-03-20 PROCEDURE — 96374 THER/PROPH/DIAG INJ IV PUSH: CPT

## 2025-03-20 PROCEDURE — 51798 US URINE CAPACITY MEASURE: CPT

## 2025-03-20 PROCEDURE — 6360000002 HC RX W HCPCS: Performed by: NURSE PRACTITIONER

## 2025-03-20 PROCEDURE — 99232 SBSQ HOSP IP/OBS MODERATE 35: CPT

## 2025-03-20 PROCEDURE — 81003 URINALYSIS AUTO W/O SCOPE: CPT

## 2025-03-20 PROCEDURE — 99223 1ST HOSP IP/OBS HIGH 75: CPT | Performed by: INTERNAL MEDICINE

## 2025-03-20 PROCEDURE — 6360000002 HC RX W HCPCS: Performed by: INTERNAL MEDICINE

## 2025-03-20 PROCEDURE — 2500000003 HC RX 250 WO HCPCS: Performed by: FAMILY MEDICINE

## 2025-03-20 PROCEDURE — 82728 ASSAY OF FERRITIN: CPT

## 2025-03-20 PROCEDURE — 94640 AIRWAY INHALATION TREATMENT: CPT

## 2025-03-20 PROCEDURE — 87449 NOS EACH ORGANISM AG IA: CPT

## 2025-03-20 PROCEDURE — 6370000000 HC RX 637 (ALT 250 FOR IP): Performed by: INTERNAL MEDICINE

## 2025-03-20 PROCEDURE — 2580000003 HC RX 258: Performed by: NURSE PRACTITIONER

## 2025-03-20 PROCEDURE — 86738 MYCOPLASMA ANTIBODY: CPT

## 2025-03-20 PROCEDURE — 2580000003 HC RX 258: Performed by: FAMILY MEDICINE

## 2025-03-20 PROCEDURE — 6370000000 HC RX 637 (ALT 250 FOR IP): Performed by: FAMILY MEDICINE

## 2025-03-20 PROCEDURE — 87899 AGENT NOS ASSAY W/OPTIC: CPT

## 2025-03-20 PROCEDURE — 71045 X-RAY EXAM CHEST 1 VIEW: CPT

## 2025-03-20 PROCEDURE — 2060000000 HC ICU INTERMEDIATE R&B

## 2025-03-20 PROCEDURE — 92610 EVALUATE SWALLOWING FUNCTION: CPT

## 2025-03-20 PROCEDURE — 36415 COLL VENOUS BLD VENIPUNCTURE: CPT

## 2025-03-20 PROCEDURE — 87641 MR-STAPH DNA AMP PROBE: CPT

## 2025-03-20 RX ORDER — ONDANSETRON 2 MG/ML
4 INJECTION INTRAMUSCULAR; INTRAVENOUS EVERY 6 HOURS PRN
Status: DISCONTINUED | OUTPATIENT
Start: 2025-03-20 | End: 2025-03-23 | Stop reason: HOSPADM

## 2025-03-20 RX ORDER — ACETAMINOPHEN 325 MG/1
650 TABLET ORAL EVERY 6 HOURS PRN
Status: DISCONTINUED | OUTPATIENT
Start: 2025-03-20 | End: 2025-03-23 | Stop reason: HOSPADM

## 2025-03-20 RX ORDER — GUAIFENESIN 600 MG/1
600 TABLET, EXTENDED RELEASE ORAL 2 TIMES DAILY
Status: DISCONTINUED | OUTPATIENT
Start: 2025-03-20 | End: 2025-03-23 | Stop reason: HOSPADM

## 2025-03-20 RX ORDER — ECHINACEA PURPUREA EXTRACT 125 MG
1 TABLET ORAL EVERY 6 HOURS
Status: DISCONTINUED | OUTPATIENT
Start: 2025-03-20 | End: 2025-03-23 | Stop reason: HOSPADM

## 2025-03-20 RX ORDER — FERROUS SULFATE 325(65) MG
325 TABLET, DELAYED RELEASE (ENTERIC COATED) ORAL
Status: DISCONTINUED | OUTPATIENT
Start: 2025-03-20 | End: 2025-03-23 | Stop reason: HOSPADM

## 2025-03-20 RX ORDER — HYDRALAZINE HYDROCHLORIDE 50 MG/1
25 TABLET, FILM COATED ORAL EVERY 8 HOURS SCHEDULED
Status: DISCONTINUED | OUTPATIENT
Start: 2025-03-20 | End: 2025-03-21

## 2025-03-20 RX ORDER — PREDNISONE 20 MG/1
40 TABLET ORAL DAILY
Status: DISCONTINUED | OUTPATIENT
Start: 2025-03-20 | End: 2025-03-23 | Stop reason: HOSPADM

## 2025-03-20 RX ORDER — ALPRAZOLAM 0.25 MG
0.25 TABLET ORAL 3 TIMES DAILY PRN
Status: DISCONTINUED | OUTPATIENT
Start: 2025-03-20 | End: 2025-03-21

## 2025-03-20 RX ORDER — LIDOCAINE 4 G/G
1 PATCH TOPICAL DAILY
Status: DISCONTINUED | OUTPATIENT
Start: 2025-03-20 | End: 2025-03-22

## 2025-03-20 RX ORDER — FLUTICASONE PROPIONATE 50 MCG
2 SPRAY, SUSPENSION (ML) NASAL DAILY
Status: DISCONTINUED | OUTPATIENT
Start: 2025-03-20 | End: 2025-03-23 | Stop reason: HOSPADM

## 2025-03-20 RX ORDER — MULTIVITAMIN WITH IRON
1000 TABLET ORAL DAILY
Status: DISCONTINUED | OUTPATIENT
Start: 2025-03-20 | End: 2025-03-21

## 2025-03-20 RX ORDER — HYDROCODONE BITARTRATE AND ACETAMINOPHEN 5; 325 MG/1; MG/1
1 TABLET ORAL EVERY 6 HOURS PRN
Status: DISCONTINUED | OUTPATIENT
Start: 2025-03-20 | End: 2025-03-21

## 2025-03-20 RX ORDER — CETIRIZINE HYDROCHLORIDE 10 MG/1
10 TABLET ORAL DAILY
Status: DISCONTINUED | OUTPATIENT
Start: 2025-03-20 | End: 2025-03-23 | Stop reason: HOSPADM

## 2025-03-20 RX ORDER — SODIUM CHLORIDE 0.9 % (FLUSH) 0.9 %
5-40 SYRINGE (ML) INJECTION PRN
Status: DISCONTINUED | OUTPATIENT
Start: 2025-03-20 | End: 2025-03-23 | Stop reason: HOSPADM

## 2025-03-20 RX ORDER — SODIUM CHLORIDE 9 MG/ML
INJECTION, SOLUTION INTRAVENOUS CONTINUOUS
Status: DISCONTINUED | OUTPATIENT
Start: 2025-03-20 | End: 2025-03-20

## 2025-03-20 RX ORDER — HYDRALAZINE HYDROCHLORIDE 25 MG/1
25 TABLET, FILM COATED ORAL EVERY 8 HOURS SCHEDULED
Status: DISCONTINUED | OUTPATIENT
Start: 2025-03-20 | End: 2025-03-20

## 2025-03-20 RX ORDER — POTASSIUM CHLORIDE 1500 MG/1
20 TABLET, EXTENDED RELEASE ORAL ONCE
Status: COMPLETED | OUTPATIENT
Start: 2025-03-20 | End: 2025-03-20

## 2025-03-20 RX ORDER — SODIUM CHLORIDE 9 MG/ML
INJECTION, SOLUTION INTRAVENOUS CONTINUOUS
Status: DISCONTINUED | OUTPATIENT
Start: 2025-03-20 | End: 2025-03-21

## 2025-03-20 RX ORDER — AMLODIPINE BESYLATE 10 MG/1
5 TABLET ORAL DAILY
Status: DISCONTINUED | OUTPATIENT
Start: 2025-03-20 | End: 2025-03-20

## 2025-03-20 RX ORDER — IPRATROPIUM BROMIDE AND ALBUTEROL SULFATE 2.5; .5 MG/3ML; MG/3ML
1 SOLUTION RESPIRATORY (INHALATION) EVERY 4 HOURS PRN
Status: DISCONTINUED | OUTPATIENT
Start: 2025-03-20 | End: 2025-03-20

## 2025-03-20 RX ORDER — ONDANSETRON 4 MG/1
4 TABLET, ORALLY DISINTEGRATING ORAL EVERY 8 HOURS PRN
Status: DISCONTINUED | OUTPATIENT
Start: 2025-03-20 | End: 2025-03-23 | Stop reason: HOSPADM

## 2025-03-20 RX ORDER — GUAIFENESIN/DEXTROMETHORPHAN 100-10MG/5
5 SYRUP ORAL EVERY 4 HOURS PRN
Status: DISCONTINUED | OUTPATIENT
Start: 2025-03-20 | End: 2025-03-21

## 2025-03-20 RX ORDER — LEVOTHYROXINE SODIUM 50 UG/1
50 TABLET ORAL DAILY
Status: DISCONTINUED | OUTPATIENT
Start: 2025-03-20 | End: 2025-03-23 | Stop reason: HOSPADM

## 2025-03-20 RX ORDER — ALPRAZOLAM 0.25 MG
0.25 TABLET ORAL 3 TIMES DAILY PRN
Status: DISCONTINUED | OUTPATIENT
Start: 2025-03-20 | End: 2025-03-20

## 2025-03-20 RX ORDER — FUROSEMIDE 10 MG/ML
40 INJECTION INTRAMUSCULAR; INTRAVENOUS DAILY
Status: DISCONTINUED | OUTPATIENT
Start: 2025-03-20 | End: 2025-03-22

## 2025-03-20 RX ORDER — DONEPEZIL HYDROCHLORIDE 10 MG/1
10 TABLET, FILM COATED ORAL NIGHTLY
Status: DISCONTINUED | OUTPATIENT
Start: 2025-03-20 | End: 2025-03-23 | Stop reason: HOSPADM

## 2025-03-20 RX ORDER — HYDRALAZINE HYDROCHLORIDE 20 MG/ML
20 INJECTION INTRAMUSCULAR; INTRAVENOUS EVERY 4 HOURS PRN
Status: DISCONTINUED | OUTPATIENT
Start: 2025-03-20 | End: 2025-03-20

## 2025-03-20 RX ORDER — SODIUM CHLORIDE 0.9 % (FLUSH) 0.9 %
5-40 SYRINGE (ML) INJECTION EVERY 12 HOURS SCHEDULED
Status: DISCONTINUED | OUTPATIENT
Start: 2025-03-20 | End: 2025-03-23 | Stop reason: HOSPADM

## 2025-03-20 RX ORDER — HEPARIN SODIUM 5000 [USP'U]/ML
5000 INJECTION, SOLUTION INTRAVENOUS; SUBCUTANEOUS EVERY 8 HOURS SCHEDULED
Status: DISCONTINUED | OUTPATIENT
Start: 2025-03-20 | End: 2025-03-23 | Stop reason: HOSPADM

## 2025-03-20 RX ORDER — CARVEDILOL 6.25 MG/1
3.12 TABLET ORAL 2 TIMES DAILY WITH MEALS
Status: DISCONTINUED | OUTPATIENT
Start: 2025-03-20 | End: 2025-03-22

## 2025-03-20 RX ORDER — ALBUTEROL SULFATE 0.83 MG/ML
2.5 SOLUTION RESPIRATORY (INHALATION) EVERY 6 HOURS PRN
Status: DISCONTINUED | OUTPATIENT
Start: 2025-03-20 | End: 2025-03-23 | Stop reason: HOSPADM

## 2025-03-20 RX ORDER — FUROSEMIDE 40 MG/1
40 TABLET ORAL DAILY
Status: DISCONTINUED | OUTPATIENT
Start: 2025-03-20 | End: 2025-03-22

## 2025-03-20 RX ORDER — HYDRALAZINE HYDROCHLORIDE 20 MG/ML
10 INJECTION INTRAMUSCULAR; INTRAVENOUS EVERY 4 HOURS PRN
Status: DISCONTINUED | OUTPATIENT
Start: 2025-03-20 | End: 2025-03-22

## 2025-03-20 RX ORDER — ENOXAPARIN SODIUM 100 MG/ML
30 INJECTION SUBCUTANEOUS DAILY
Status: DISCONTINUED | OUTPATIENT
Start: 2025-03-20 | End: 2025-03-20

## 2025-03-20 RX ORDER — IPRATROPIUM BROMIDE AND ALBUTEROL SULFATE 2.5; .5 MG/3ML; MG/3ML
1 SOLUTION RESPIRATORY (INHALATION)
Status: DISCONTINUED | OUTPATIENT
Start: 2025-03-20 | End: 2025-03-20

## 2025-03-20 RX ORDER — POLYETHYLENE GLYCOL 3350 17 G/17G
17 POWDER, FOR SOLUTION ORAL DAILY PRN
Status: DISCONTINUED | OUTPATIENT
Start: 2025-03-20 | End: 2025-03-23 | Stop reason: HOSPADM

## 2025-03-20 RX ORDER — ACETAMINOPHEN 650 MG/1
650 SUPPOSITORY RECTAL EVERY 6 HOURS PRN
Status: DISCONTINUED | OUTPATIENT
Start: 2025-03-20 | End: 2025-03-23 | Stop reason: HOSPADM

## 2025-03-20 RX ORDER — BENZONATATE 100 MG/1
100 CAPSULE ORAL 3 TIMES DAILY PRN
Status: DISCONTINUED | OUTPATIENT
Start: 2025-03-20 | End: 2025-03-23 | Stop reason: HOSPADM

## 2025-03-20 RX ORDER — SODIUM CHLORIDE 9 MG/ML
INJECTION, SOLUTION INTRAVENOUS PRN
Status: DISCONTINUED | OUTPATIENT
Start: 2025-03-20 | End: 2025-03-23 | Stop reason: HOSPADM

## 2025-03-20 RX ADMIN — SALINE NASAL SPRAY 1 SPRAY: 1.5 SOLUTION NASAL at 20:42

## 2025-03-20 RX ADMIN — VANCOMYCIN HYDROCHLORIDE 1000 MG: 1 INJECTION, POWDER, LYOPHILIZED, FOR SOLUTION INTRAVENOUS at 01:31

## 2025-03-20 RX ADMIN — LEVOTHYROXINE SODIUM 50 MCG: 0.05 TABLET ORAL at 09:06

## 2025-03-20 RX ADMIN — HEPARIN SODIUM 5000 UNITS: 5000 INJECTION INTRAVENOUS; SUBCUTANEOUS at 13:56

## 2025-03-20 RX ADMIN — GUAIFENESIN 600 MG: 600 TABLET, EXTENDED RELEASE ORAL at 09:34

## 2025-03-20 RX ADMIN — ALPRAZOLAM 0.25 MG: 0.25 TABLET ORAL at 05:03

## 2025-03-20 RX ADMIN — ACETAMINOPHEN 650 MG: 325 TABLET ORAL at 22:41

## 2025-03-20 RX ADMIN — AZITHROMYCIN MONOHYDRATE 250 MG: 500 INJECTION, POWDER, LYOPHILIZED, FOR SOLUTION INTRAVENOUS at 13:30

## 2025-03-20 RX ADMIN — PREDNISONE 40 MG: 20 TABLET ORAL at 11:40

## 2025-03-20 RX ADMIN — HEPARIN SODIUM 5000 UNITS: 5000 INJECTION INTRAVENOUS; SUBCUTANEOUS at 20:42

## 2025-03-20 RX ADMIN — SODIUM CHLORIDE, PRESERVATIVE FREE 10 ML: 5 INJECTION INTRAVENOUS at 20:42

## 2025-03-20 RX ADMIN — WATER 1000 MG: 1 INJECTION INTRAMUSCULAR; INTRAVENOUS; SUBCUTANEOUS at 12:51

## 2025-03-20 RX ADMIN — SODIUM CHLORIDE: 0.9 INJECTION, SOLUTION INTRAVENOUS at 00:47

## 2025-03-20 RX ADMIN — CETIRIZINE HYDROCHLORIDE 10 MG: 10 TABLET, FILM COATED ORAL at 09:35

## 2025-03-20 RX ADMIN — HEPARIN SODIUM 5000 UNITS: 5000 INJECTION INTRAVENOUS; SUBCUTANEOUS at 06:12

## 2025-03-20 RX ADMIN — POTASSIUM CHLORIDE 20 MEQ: 1500 TABLET, EXTENDED RELEASE ORAL at 02:30

## 2025-03-20 RX ADMIN — PIPERACILLIN AND TAZOBACTAM 3375 MG: 3; .375 INJECTION, POWDER, LYOPHILIZED, FOR SOLUTION INTRAVENOUS at 00:38

## 2025-03-20 RX ADMIN — ALBUTEROL SULFATE 2.5 MG: 2.5 SOLUTION RESPIRATORY (INHALATION) at 14:52

## 2025-03-20 RX ADMIN — SALINE NASAL SPRAY 1 SPRAY: 1.5 SOLUTION NASAL at 04:00

## 2025-03-20 RX ADMIN — SODIUM CHLORIDE, PRESERVATIVE FREE 10 ML: 5 INJECTION INTRAVENOUS at 09:35

## 2025-03-20 RX ADMIN — ACETAMINOPHEN 650 MG: 325 TABLET ORAL at 14:12

## 2025-03-20 RX ADMIN — ALPRAZOLAM 0.25 MG: 0.25 TABLET ORAL at 22:41

## 2025-03-20 RX ADMIN — SALINE NASAL SPRAY 1 SPRAY: 1.5 SOLUTION NASAL at 09:35

## 2025-03-20 RX ADMIN — HYDRALAZINE HYDROCHLORIDE 20 MG: 20 INJECTION INTRAMUSCULAR; INTRAVENOUS at 15:16

## 2025-03-20 RX ADMIN — BENZONATATE 100 MG: 100 CAPSULE ORAL at 14:40

## 2025-03-20 RX ADMIN — IPRATROPIUM BROMIDE AND ALBUTEROL SULFATE 1 DOSE: .5; 2.5 SOLUTION RESPIRATORY (INHALATION) at 08:12

## 2025-03-20 RX ADMIN — CYANOCOBALAMIN TAB 500 MCG 1000 MCG: 500 TAB at 09:35

## 2025-03-20 RX ADMIN — SALINE NASAL SPRAY 1 SPRAY: 1.5 SOLUTION NASAL at 14:40

## 2025-03-20 RX ADMIN — GUAIFENESIN AND DEXTROMETHORPHAN 5 ML: 100; 10 SYRUP ORAL at 02:30

## 2025-03-20 RX ADMIN — SODIUM CHLORIDE: 0.9 INJECTION, SOLUTION INTRAVENOUS at 12:51

## 2025-03-20 RX ADMIN — FERROUS SULFATE TAB EC 325 MG (65 MG FE EQUIVALENT) 325 MG: 325 (65 FE) TABLET DELAYED RESPONSE at 09:35

## 2025-03-20 RX ADMIN — AMLODIPINE BESYLATE 5 MG: 10 TABLET ORAL at 15:16

## 2025-03-20 RX ADMIN — FUROSEMIDE 40 MG: 10 INJECTION, SOLUTION INTRAMUSCULAR; INTRAVENOUS at 05:03

## 2025-03-20 RX ADMIN — FLUOXETINE HYDROCHLORIDE 20 MG: 20 CAPSULE ORAL at 09:35

## 2025-03-20 RX ADMIN — CARVEDILOL 3.12 MG: 6.25 TABLET, FILM COATED ORAL at 17:44

## 2025-03-20 RX ADMIN — GUAIFENESIN 600 MG: 600 TABLET, EXTENDED RELEASE ORAL at 20:42

## 2025-03-20 RX ADMIN — DONEPEZIL HYDROCHLORIDE 10 MG: 10 TABLET, FILM COATED ORAL at 20:42

## 2025-03-20 NOTE — ED NOTES
ED to inpatient nurses report      Chief Complaint:  Chief Complaint   Patient presents with    Rib Pain     Left    Cough     Present to ED from: Home    MOA:     LOC: alert and orientated to name, place, date  Mobility: Independent  Oxygen Baseline: Room air    Current needs required: Room air   Pending ED orders: admit  Present condition: Stable    Why did the patient come to the ED? C/o frequent falls and cough. States left side/ ribs is painful-left rib fracture, elevated trop but not increasing. Pneumonia noted on xray and CT  What is the plan? Admit, antibiotics for pneumonia, pain control  Any procedures or intervention occur? Labs, imaging  Any safety concerns?? Hx of alzheimers, language barrier    Mental Status:       Psych Assessment:   Psychosocial  Psychosocial (WDL): Within Defined Limits  Vital signs   Vitals:    03/19/25 2205 03/19/25 2215 03/19/25 2220 03/19/25 2225   BP: (!) 159/79 126/82 (!) 156/62 (!) 152/62   Pulse: 72 68 64 66   Resp: 20 21 21 24   Temp:       TempSrc:       SpO2: 97% 96% 97% 96%        Vitals:  Patient Vitals for the past 24 hrs:   BP Temp Temp src Pulse Resp SpO2   03/19/25 2225 (!) 152/62 -- -- 66 24 96 %   03/19/25 2220 (!) 156/62 -- -- 64 21 97 %   03/19/25 2215 126/82 -- -- 68 21 96 %   03/19/25 2205 (!) 159/79 -- -- 72 20 97 %   03/19/25 2155 (!) 149/86 -- -- 71 25 97 %   03/19/25 2150 (!) 155/66 -- -- 65 23 96 %   03/19/25 2145 (!) 141/57 -- -- 66 25 96 %   03/19/25 2109 -- -- -- 69 24 97 %   03/19/25 2049 -- -- -- 70 13 97 %   03/19/25 2018 (!) 143/60 98.1 °F (36.7 °C) Oral 72 24 96 %      Visit Vitals  BP (!) 152/62   Pulse 66   Temp 98.1 °F (36.7 °C) (Oral)   Resp 24   SpO2 96%        LDAs:   Peripheral IV 03/19/25 Right Forearm (Active)   Site Assessment Clean, dry & intact 03/19/25 2242   Line Status Blood return noted;Flushed;Normal saline locked 03/19/25 2242   Dressing Status New dressing applied;Clean, dry & intact 03/19/25 2242   Dressing Type Transparent

## 2025-03-20 NOTE — H&P
[facility]  Office: [phone]  Jose A Paul DO, Brian Crump DO, Samir Omer DO, Sergio Crystal DO, Camilo Rahman MD, Bianca Orozco MD, Esa Hughes MD, Meredith Dunlap MD,  David Ricketts MD, Arben Gonzalez MD, Dorina Chacko MD,  Bogdan Parikh DO, Isaiah Hall MD, Jonah Hanson MD, Yogesh Paul DO, Maria Teresa Gupta MD,  Theo Mccall DO, Savannah Crowell MD, Luly Espinoza MD, Suyapa Villanueva MD, Blanca Hess MD,  Madi Pak MD, Fred Delarosa MD, Jovanny Lafleur MD, Spenser Avelar MD, Quintin Aranda MD, Jacky Simon MD, Willy Davis DO, Kraig Bose MD, Bogdan Shaw MD, Mohsin Reza, MD, Shirley Waterhouse, CNP,  Lenore Armenta CNP, Willy Piña, CNP,  Zoë Bhat, CHRIS, Eileen Temple, CNP, Gabriela Montiel, CNP, Janelle Hobbs, CNP, Bibi Allen, CNP, PATRICIA Skelton-SHANTAL, Marcie Stone, CNP, Sae Wen, CNP,  Mica Infante, CNP, Caprice Pruitt, CNP, Alanna Garcia, CNP,  Alina Wayne, CNS, Vanessa Tavarez CNP, Rubi Gtz CNP,   Demi Lerma, CNP         [facility]   IN-PATIENT SERVICE   [facility]    HISTORY AND PHYSICAL EXAMINATION            Date:   3/20/2025  Patient name:  Anahi Mccarty  Date of admission:  3/19/2025  8:05 PM  MRN:   [id number]  Account:  [id number]  YOB: 1930  PCP:    Kannan Silverio MD  Room:   OBS 10/10-1  Code Status:    Full Code    Chief Complaint:     Chief Complaint   Patient presents with    Rib Pain     Left    Cough   \"I'm ok\"    History Obtained From:     patient    History of Present Illness:     Anahi Mccarty is a 89 y.o.  female with history of anxiety/depression, CKD 3, chronic diastolic heart failure, dementia with history of prior lacunar infarct who presents with Rib Pain (Left) and Cough   and is admitted to the hospital for the management of Multifocal pneumonia.    [facility] with concern for falls with acute rib pain, possible rib

## 2025-03-20 NOTE — ED NOTES
Pt complaining of IV site pain. Pt picking at the tegaderm. Pt states she can not sleep with the IV in. IV checked by 2 RNs. Line patent. Dr. Padilla educated pt.

## 2025-03-20 NOTE — ED PROVIDER NOTES
[facility] EMERGENCY DEPARTMENT  eMERGENCY dEPARTMENT eNCOUnter   Attending Attestation     Pt Name: Anahi Mccarty  MRN: [id number]  Birthdate 9/21/1930  Date of evaluation: 3/19/25       Anahi Mccarty is a 89 y.o. female who presents with Rib Pain (Left) and Cough      9:42 PM EDT      History: Patient presents after fall.  Patient has been having increased falls recently and has also been coughing a lot.  She was having dinner with her daughter tonight when she had significant coughing that would not stop.  She also was noted to have bruising over the left side.  Patient says that her left side is very painful.  Patient does have Alzheimer's and is difficult to get a history from in addition to her language barrier.    Exam: Heart rate and rhythm are regular.  Lungs are clear anteriorly however posterior lungs are crackles especially on the left side.  Abdomen is soft, nontender.  Patient has bruising over the left flank and chest, no significant edema over the lower extremities.  Patient is awake and alert.      Patient is having increased work of breathing, will get RPP, will get CT of the head neck chest abdomen pelvis, concern for pleural effusion may be secondary to left rib fracture as seen on x-ray, patient will require admission, RPP ordered discussed with her daughter and son who is a physician.    I performed a history and physical examination of the patient and discussed management with the resident. I reviewed the resident’s note and agree with the documented findings and plan of care. Any areas of disagreement are noted on the chart. I was personally present for the key portions of any procedures. I have documented in the chart those procedures where I was not present during the key portions. I have personally reviewed all images and agree with the resident's interpretation. I have reviewed the emergency nurses triage note. I agree with the chief complaint, past medical history, past 
[facility]  FACULTY HANDOFF     11:05 PM EDT  Handoff taken on the following patient from prior Attending Physician:  Pt Name: Anahi Mccarty  PCP:  Kannan Silverio MD    Attestation  I was available and discussed any additional care issues that arose and coordinated the management plans with the resident(s) caring for the patient during my duty period. Any areas of disagreement with resident's documentation of care or procedures are noted on the chart. I was personally present for the key portions of any/all procedures during my duty period. I have documented in the chart those procedures where I was not present during the key portions.         CHIEF COMPLAINT       Chief Complaint   Patient presents with    Rib Pain     Left    Cough         CURRENT MEDICATIONS     Previous Medications  Previous Medications    ALPRAZOLAM (XANAX) 0.25 MG TABLET    Take 1 tablet by mouth 3 times daily as needed for Sleep (0.5 to 1 mg at night for sleep prn).    CYANOCOBALAMIN ([employer] VITAMIN B12) 1000 MCG TABLET    Take 1 tablet by mouth daily    DONEPEZIL (ARICEPT) 10 MG TABLET    Take 1 tablet by mouth nightly    FERROUS SULFATE (FE TABS 325) 325 (65 FE) MG EC TABLET    Take 1 tablet by mouth daily (with breakfast)    FEXOFENADINE (ALLEGRA) 180 MG TABLET    Take 1 tablet by mouth daily    FLUOXETINE (PROZAC) 20 MG CAPSULE    Take 1 capsule by mouth daily    FUROSEMIDE (LASIX) 20 MG TABLET    Take 1 tablet by mouth daily    LEVOTHYROXINE (SYNTHROID) 50 MCG TABLET    Take 1 tablet by mouth daily    LITHIUM (LITHOBID) 300 MG CR TABLET    1/4 of a tablet daily     OLANZAPINE-FLUOXETINE (SYMBYAX) 6-25 MG CAPS CAPSULE    Take 1 capsule by mouth nightly    SOLIFENACIN (VESICARE) 5 MG TABLET    Take 1 tablet by mouth daily       Encounter Medications  Orders Placed This Encounter   Medications    acetaminophen (TYLENOL) tablet 650 mg    DISCONTD: iopamidol (ISOVUE-370) 76 % injection 75 mL       ALLERGIES     is 
mildly  progressed from comparison radiograph 12/18/2024.  Minimal associated spinal  canal stenosis.     2.  No acute osseous abnormality in the cervical or lumbar spine.     3.  Multilevel degenerative changes of the lumbar spine result in severe  spinal canal stenosis at L3-L4.   [SP]   0057 Hospitalist team (Demi GALAVIZ) spoken to regarding patient presentation, workup, concerns for pneumonia.  Patient will be admitted to hospitalist team.  IV antibiotics initiated. [SP]      ED Course User Index  [SP] Best Padilla MD       PROCEDURES:      CONSULTS:  IP CONSULT TO INTERNAL MEDICINE  IP CONSULT TO HOSPITALIST    CRITICAL CARE:  There was significant risk of life threatening deterioration of patient's condition requiring my direct management. Critical care time  minutes, excluding any documented procedures.    FINAL IMPRESSION      1. Chest wall pain    2. Fall, initial encounter    3. Pneumonia of both lower lobes due to infectious organism          DISPOSITION / PLAN     DISPOSITION Admitted 03/20/2025 01:00:02 AM               PATIENT REFERRED TO:  No follow-up provider specified.    DISCHARGE MEDICATIONS:  New Prescriptions    No medications on file       Best Padilla MD  Emergency Medicine Resident    (Please note that portions of thisnote were completed with a voice recognition program.  Efforts were made to edit the dictations but occasionally words are mis-transcribed.)

## 2025-03-20 NOTE — ED NOTES
Pt to ED with Daughter due to a fall this morning. Pt denies LOC. Pt states pain on her left ribs, head, left shoulder, and knees. Pt denies blood thinners. Pt has scattered ecchymosis over her arms, back and left side.  Daughter states pt took tylenol for pain. Daughter states pt has fallen multiple times over the last couple of weeks. Pt is resting in bed with daughter at bedside.

## 2025-03-21 LAB
ANION GAP SERPL CALCULATED.3IONS-SCNC: 10 MMOL/L (ref 9–16)
BASOPHILS # BLD: <0.03 K/UL (ref 0–0.2)
BASOPHILS NFR BLD: 0 % (ref 0–2)
BNP SERPL-MCNC: 3128 PG/ML (ref 0–450)
BUN SERPL-MCNC: 21 MG/DL (ref 8–23)
CALCIUM SERPL-MCNC: 8.5 MG/DL (ref 8.6–10.4)
CHLORIDE SERPL-SCNC: 105 MMOL/L (ref 98–107)
CO2 SERPL-SCNC: 23 MMOL/L (ref 20–31)
CREAT SERPL-MCNC: 1.4 MG/DL (ref 0.6–0.9)
EKG ATRIAL RATE: 71 BPM
EKG P AXIS: 51 DEGREES
EKG P-R INTERVAL: 172 MS
EKG Q-T INTERVAL: 422 MS
EKG QRS DURATION: 80 MS
EKG QTC CALCULATION (BAZETT): 458 MS
EKG R AXIS: 54 DEGREES
EKG T AXIS: 57 DEGREES
EKG VENTRICULAR RATE: 71 BPM
EOSINOPHIL # BLD: <0.03 K/UL (ref 0–0.44)
EOSINOPHILS RELATIVE PERCENT: 0 % (ref 1–4)
ERYTHROCYTE [DISTWIDTH] IN BLOOD BY AUTOMATED COUNT: 15.2 % (ref 11.8–14.4)
GFR, ESTIMATED: 35 ML/MIN/1.73M2
GLUCOSE SERPL-MCNC: 131 MG/DL (ref 74–99)
HCT VFR BLD AUTO: 32 % (ref 36.3–47.1)
HGB BLD-MCNC: 9.5 G/DL (ref 11.9–15.1)
IMM GRANULOCYTES # BLD AUTO: 0.14 K/UL (ref 0–0.3)
IMM GRANULOCYTES NFR BLD: 1 %
LYMPHOCYTES NFR BLD: 0.89 K/UL (ref 1.1–3.7)
LYMPHOCYTES RELATIVE PERCENT: 7 % (ref 24–43)
M PNEUMO IGM SER QL IA: 0.1
MCH RBC QN AUTO: 27.7 PG (ref 25.2–33.5)
MCHC RBC AUTO-ENTMCNC: 29.7 G/DL (ref 28.4–34.8)
MCV RBC AUTO: 93.3 FL (ref 82.6–102.9)
MONOCYTES NFR BLD: 0.25 K/UL (ref 0.1–1.2)
MONOCYTES NFR BLD: 2 % (ref 3–12)
MRSA, DNA, NASAL: NEGATIVE
NEUTROPHILS NFR BLD: 90 % (ref 36–65)
NEUTS SEG NFR BLD: 12.03 K/UL (ref 1.5–8.1)
NRBC BLD-RTO: 0 PER 100 WBC
PLATELET # BLD AUTO: 303 K/UL (ref 138–453)
PMV BLD AUTO: 8.9 FL (ref 8.1–13.5)
POTASSIUM SERPL-SCNC: 3.9 MMOL/L (ref 3.7–5.3)
PROCALCITONIN SERPL-MCNC: 3.56 NG/ML (ref 0–0.09)
RBC # BLD AUTO: 3.43 M/UL (ref 3.95–5.11)
RBC # BLD: ABNORMAL 10*6/UL
SODIUM SERPL-SCNC: 138 MMOL/L (ref 136–145)
SPECIMEN DESCRIPTION: NORMAL
WBC OTHER # BLD: 13.3 K/UL (ref 3.5–11.3)

## 2025-03-21 PROCEDURE — 6360000002 HC RX W HCPCS: Performed by: FAMILY MEDICINE

## 2025-03-21 PROCEDURE — 2500000003 HC RX 250 WO HCPCS: Performed by: NURSE PRACTITIONER

## 2025-03-21 PROCEDURE — 80048 BASIC METABOLIC PNL TOTAL CA: CPT

## 2025-03-21 PROCEDURE — 93010 ELECTROCARDIOGRAM REPORT: CPT | Performed by: INTERNAL MEDICINE

## 2025-03-21 PROCEDURE — 83880 ASSAY OF NATRIURETIC PEPTIDE: CPT

## 2025-03-21 PROCEDURE — 84145 PROCALCITONIN (PCT): CPT

## 2025-03-21 PROCEDURE — 85025 COMPLETE CBC W/AUTO DIFF WBC: CPT

## 2025-03-21 PROCEDURE — 6370000000 HC RX 637 (ALT 250 FOR IP): Performed by: FAMILY MEDICINE

## 2025-03-21 PROCEDURE — 99232 SBSQ HOSP IP/OBS MODERATE 35: CPT | Performed by: INTERNAL MEDICINE

## 2025-03-21 PROCEDURE — 6360000002 HC RX W HCPCS: Performed by: NURSE PRACTITIONER

## 2025-03-21 PROCEDURE — 2060000000 HC ICU INTERMEDIATE R&B

## 2025-03-21 PROCEDURE — 2500000003 HC RX 250 WO HCPCS: Performed by: FAMILY MEDICINE

## 2025-03-21 PROCEDURE — 2580000003 HC RX 258: Performed by: FAMILY MEDICINE

## 2025-03-21 PROCEDURE — 51798 US URINE CAPACITY MEASURE: CPT

## 2025-03-21 PROCEDURE — 36415 COLL VENOUS BLD VENIPUNCTURE: CPT

## 2025-03-21 PROCEDURE — 6370000000 HC RX 637 (ALT 250 FOR IP): Performed by: NURSE PRACTITIONER

## 2025-03-21 PROCEDURE — 6370000000 HC RX 637 (ALT 250 FOR IP): Performed by: INTERNAL MEDICINE

## 2025-03-21 RX ORDER — AZITHROMYCIN 500 MG/1
500 TABLET, FILM COATED ORAL DAILY
DISCHARGE
Start: 2025-03-22 | End: 2025-03-23 | Stop reason: HOSPADM

## 2025-03-21 RX ORDER — LIDOCAINE 4 G/G
1 PATCH TOPICAL DAILY
DISCHARGE
Start: 2025-03-22 | End: 2025-03-22 | Stop reason: HOSPADM

## 2025-03-21 RX ORDER — PREDNISONE 20 MG/1
40 TABLET ORAL DAILY
DISCHARGE
Start: 2025-03-22 | End: 2025-03-23 | Stop reason: HOSPADM

## 2025-03-21 RX ORDER — GUAIFENESIN 600 MG/1
600 TABLET, EXTENDED RELEASE ORAL 2 TIMES DAILY
DISCHARGE
Start: 2025-03-21 | End: 2025-03-28

## 2025-03-21 RX ORDER — CEFUROXIME AXETIL 250 MG/1
250 TABLET ORAL 2 TIMES DAILY
DISCHARGE
Start: 2025-03-21 | End: 2025-03-28

## 2025-03-21 RX ADMIN — HEPARIN SODIUM 5000 UNITS: 5000 INJECTION INTRAVENOUS; SUBCUTANEOUS at 05:53

## 2025-03-21 RX ADMIN — TIZANIDINE 2 MG: 4 TABLET ORAL at 05:53

## 2025-03-21 RX ADMIN — HEPARIN SODIUM 5000 UNITS: 5000 INJECTION INTRAVENOUS; SUBCUTANEOUS at 14:15

## 2025-03-21 RX ADMIN — FERROUS SULFATE TAB EC 325 MG (65 MG FE EQUIVALENT) 325 MG: 325 (65 FE) TABLET DELAYED RESPONSE at 08:49

## 2025-03-21 RX ADMIN — FLUOXETINE HYDROCHLORIDE 20 MG: 20 CAPSULE ORAL at 08:49

## 2025-03-21 RX ADMIN — CETIRIZINE HYDROCHLORIDE 10 MG: 10 TABLET, FILM COATED ORAL at 08:49

## 2025-03-21 RX ADMIN — TIZANIDINE 2 MG: 4 TABLET ORAL at 20:04

## 2025-03-21 RX ADMIN — CARVEDILOL 3.12 MG: 6.25 TABLET, FILM COATED ORAL at 11:48

## 2025-03-21 RX ADMIN — SODIUM CHLORIDE, PRESERVATIVE FREE 10 ML: 5 INJECTION INTRAVENOUS at 20:00

## 2025-03-21 RX ADMIN — ACETAMINOPHEN 650 MG: 325 TABLET ORAL at 08:48

## 2025-03-21 RX ADMIN — DONEPEZIL HYDROCHLORIDE 10 MG: 10 TABLET, FILM COATED ORAL at 19:59

## 2025-03-21 RX ADMIN — ACETAMINOPHEN 650 MG: 325 TABLET ORAL at 17:53

## 2025-03-21 RX ADMIN — WATER 1000 MG: 1 INJECTION INTRAMUSCULAR; INTRAVENOUS; SUBCUTANEOUS at 11:51

## 2025-03-21 RX ADMIN — BENZONATATE 100 MG: 100 CAPSULE ORAL at 14:26

## 2025-03-21 RX ADMIN — SALINE NASAL SPRAY 1 SPRAY: 1.5 SOLUTION NASAL at 03:28

## 2025-03-21 RX ADMIN — LEVOTHYROXINE SODIUM 50 MCG: 0.05 TABLET ORAL at 08:49

## 2025-03-21 RX ADMIN — PREDNISONE 40 MG: 20 TABLET ORAL at 08:49

## 2025-03-21 RX ADMIN — GUAIFENESIN 600 MG: 600 TABLET, EXTENDED RELEASE ORAL at 19:59

## 2025-03-21 RX ADMIN — GUAIFENESIN 600 MG: 600 TABLET, EXTENDED RELEASE ORAL at 08:49

## 2025-03-21 RX ADMIN — HEPARIN SODIUM 5000 UNITS: 5000 INJECTION INTRAVENOUS; SUBCUTANEOUS at 19:59

## 2025-03-21 RX ADMIN — SODIUM CHLORIDE, PRESERVATIVE FREE 10 ML: 5 INJECTION INTRAVENOUS at 08:50

## 2025-03-21 RX ADMIN — AZITHROMYCIN MONOHYDRATE 250 MG: 500 INJECTION, POWDER, LYOPHILIZED, FOR SOLUTION INTRAVENOUS at 13:11

## 2025-03-21 RX ADMIN — SALINE NASAL SPRAY 1 SPRAY: 1.5 SOLUTION NASAL at 19:59

## 2025-03-21 RX ADMIN — CYANOCOBALAMIN TAB 500 MCG 1000 MCG: 500 TAB at 08:49

## 2025-03-21 ASSESSMENT — PAIN DESCRIPTION - LOCATION
LOCATION: BACK
LOCATION: BACK
LOCATION: ELBOW
LOCATION: ELBOW

## 2025-03-21 ASSESSMENT — PAIN DESCRIPTION - ORIENTATION
ORIENTATION: LEFT
ORIENTATION: LEFT

## 2025-03-21 ASSESSMENT — PAIN SCALES - GENERAL
PAINLEVEL_OUTOF10: 0
PAINLEVEL_OUTOF10: 0

## 2025-03-21 ASSESSMENT — PAIN SCALES - WONG BAKER: WONGBAKER_NUMERICALRESPONSE: NO HURT

## 2025-03-21 NOTE — DISCHARGE INSTRUCTIONS
-Make an appointment with  your primary care physician within one week of discharge for reevaluation of your symptoms  -continue taking medications as prescribed

## 2025-03-21 NOTE — DISCHARGE INSTR - COC
Continuity of Care Form    Patient Name: Anahi Mccarty   :  1930  MRN:  [id number]    Admit date:  3/19/2025  Discharge date:  3/23    Code Status Order: DNR-CCA   Advance Directives:     Admitting Physician:  Bogdan RODRIGES DO  PCP: Kannan Silverio MD    Discharging Nurse: Ramona Sue  Discharging Hospital Unit/Room#: 0424/0424-01  Discharging Unit Phone Number: [phone]    Emergency Contact:   Extended Emergency Contact Information  Primary Emergency Contact: Tommy Giordano  Home Phone: [phone]  Work Phone: [phone]  Mobile Phone: [phone]  Relation: Child  Preferred language: English   needed? No  Secondary Emergency Contact: John Paul Mccarty  Home Phone: [phone]  Relation: Child    Past Surgical History:  Past Surgical History:   Procedure Laterality Date    APPENDECTOMY      BLADDER SURGERY      CATARACT EXTRACTION      TONSILLECTOMY         Immunization History:   Immunization History   Administered Date(s) Administered    COVID-19, MODERNA BLUE border, Primary or Immunocompromised, (age 12y+), IM, 100 mcg/0.5mL 2021, 2021    TDaP, ADACEL (age 10y-64y), BOOSTRIX (age 10y+), IM, 0.5mL 2017       Active Problems:  Patient Active Problem List   Diagnosis Code    Chest wall pain R07.89    Constipation K59.00    Hypothyroidism E03.9    Hypokalemia E87.6    Postherpetic neuralgia B02.29    Acute kidney injury N17.9    Laceration of head S01.91XA    Dementia in other diseases classified elsewhere, moderate, without behavioral disturbance, psychotic disturbance, mood disturbance, and anxiety ([facility]) F02.B0    Essential hypertension I10    At high risk for falls Z91.81    Alzheimer's disease with late onset (CODE) ([facility]) G30.1    Bipolar 1 disorder (HCC) F31.9    Anxiety F41.9    Chronic combined systolic and diastolic congestive heart failure ([facility]) I50.42    Disc degeneration, lumbar M51.369    Mixed stress and urge urinary incontinence N39.46    Localized edema

## 2025-03-22 ENCOUNTER — APPOINTMENT (OUTPATIENT)
Dept: GENERAL RADIOLOGY | Age: 89
DRG: 193 | End: 2025-03-22
Payer: MEDICARE

## 2025-03-22 LAB
ANION GAP SERPL CALCULATED.3IONS-SCNC: 12 MMOL/L (ref 9–16)
BASOPHILS # BLD: 0.04 K/UL (ref 0–0.2)
BASOPHILS NFR BLD: 0 % (ref 0–2)
BUN SERPL-MCNC: 27 MG/DL (ref 8–23)
CALCIUM SERPL-MCNC: 8.9 MG/DL (ref 8.6–10.4)
CHLORIDE SERPL-SCNC: 106 MMOL/L (ref 98–107)
CO2 SERPL-SCNC: 21 MMOL/L (ref 20–31)
CREAT SERPL-MCNC: 1.3 MG/DL (ref 0.6–0.9)
EOSINOPHIL # BLD: <0.03 K/UL (ref 0–0.44)
EOSINOPHILS RELATIVE PERCENT: 0 % (ref 1–4)
ERYTHROCYTE [DISTWIDTH] IN BLOOD BY AUTOMATED COUNT: 15.3 % (ref 11.8–14.4)
GFR, ESTIMATED: 38 ML/MIN/1.73M2
GLUCOSE SERPL-MCNC: 105 MG/DL (ref 74–99)
HCT VFR BLD AUTO: 32.6 % (ref 36.3–47.1)
HGB BLD-MCNC: 10.1 G/DL (ref 11.9–15.1)
IMM GRANULOCYTES # BLD AUTO: 0.15 K/UL (ref 0–0.3)
IMM GRANULOCYTES NFR BLD: 1 %
LYMPHOCYTES NFR BLD: 2.45 K/UL (ref 1.1–3.7)
LYMPHOCYTES RELATIVE PERCENT: 13 % (ref 24–43)
MCH RBC QN AUTO: 28 PG (ref 25.2–33.5)
MCHC RBC AUTO-ENTMCNC: 31 G/DL (ref 28.4–34.8)
MCV RBC AUTO: 90.3 FL (ref 82.6–102.9)
MONOCYTES NFR BLD: 0.59 K/UL (ref 0.1–1.2)
MONOCYTES NFR BLD: 3 % (ref 3–12)
NEUTROPHILS NFR BLD: 83 % (ref 36–65)
NEUTS SEG NFR BLD: 15.39 K/UL (ref 1.5–8.1)
NRBC BLD-RTO: 0 PER 100 WBC
PLATELET # BLD AUTO: 403 K/UL (ref 138–453)
PMV BLD AUTO: 8.9 FL (ref 8.1–13.5)
POTASSIUM SERPL-SCNC: 4.2 MMOL/L (ref 3.7–5.3)
RBC # BLD AUTO: 3.61 M/UL (ref 3.95–5.11)
RBC # BLD: ABNORMAL 10*6/UL
SODIUM SERPL-SCNC: 139 MMOL/L (ref 136–145)
WBC OTHER # BLD: 18.6 K/UL (ref 3.5–11.3)

## 2025-03-22 PROCEDURE — 2700000000 HC OXYGEN THERAPY PER DAY

## 2025-03-22 PROCEDURE — 6370000000 HC RX 637 (ALT 250 FOR IP): Performed by: NURSE PRACTITIONER

## 2025-03-22 PROCEDURE — 71045 X-RAY EXAM CHEST 1 VIEW: CPT

## 2025-03-22 PROCEDURE — 97535 SELF CARE MNGMENT TRAINING: CPT

## 2025-03-22 PROCEDURE — 6360000002 HC RX W HCPCS: Performed by: NURSE PRACTITIONER

## 2025-03-22 PROCEDURE — 6360000002 HC RX W HCPCS: Performed by: FAMILY MEDICINE

## 2025-03-22 PROCEDURE — 6370000000 HC RX 637 (ALT 250 FOR IP): Performed by: FAMILY MEDICINE

## 2025-03-22 PROCEDURE — 6370000000 HC RX 637 (ALT 250 FOR IP): Performed by: INTERNAL MEDICINE

## 2025-03-22 PROCEDURE — 97116 GAIT TRAINING THERAPY: CPT

## 2025-03-22 PROCEDURE — 99232 SBSQ HOSP IP/OBS MODERATE 35: CPT | Performed by: INTERNAL MEDICINE

## 2025-03-22 PROCEDURE — 2500000003 HC RX 250 WO HCPCS: Performed by: NURSE PRACTITIONER

## 2025-03-22 PROCEDURE — 97166 OT EVAL MOD COMPLEX 45 MIN: CPT

## 2025-03-22 PROCEDURE — 2500000003 HC RX 250 WO HCPCS: Performed by: FAMILY MEDICINE

## 2025-03-22 PROCEDURE — 80048 BASIC METABOLIC PNL TOTAL CA: CPT

## 2025-03-22 PROCEDURE — 94640 AIRWAY INHALATION TREATMENT: CPT

## 2025-03-22 PROCEDURE — 94761 N-INVAS EAR/PLS OXIMETRY MLT: CPT

## 2025-03-22 PROCEDURE — 2060000000 HC ICU INTERMEDIATE R&B

## 2025-03-22 PROCEDURE — 97162 PT EVAL MOD COMPLEX 30 MIN: CPT

## 2025-03-22 PROCEDURE — 2580000003 HC RX 258: Performed by: FAMILY MEDICINE

## 2025-03-22 PROCEDURE — 36415 COLL VENOUS BLD VENIPUNCTURE: CPT

## 2025-03-22 PROCEDURE — 85025 COMPLETE CBC W/AUTO DIFF WBC: CPT

## 2025-03-22 RX ORDER — ALPRAZOLAM 0.25 MG
0.25 TABLET ORAL
Status: DISCONTINUED | OUTPATIENT
Start: 2025-03-22 | End: 2025-03-23 | Stop reason: HOSPADM

## 2025-03-22 RX ORDER — ALPRAZOLAM 0.5 MG
0.5 TABLET ORAL
Status: DISCONTINUED | OUTPATIENT
Start: 2025-03-22 | End: 2025-03-23 | Stop reason: HOSPADM

## 2025-03-22 RX ORDER — FUROSEMIDE 20 MG/1
20 TABLET ORAL DAILY
Status: DISCONTINUED | OUTPATIENT
Start: 2025-03-22 | End: 2025-03-22

## 2025-03-22 RX ORDER — FUROSEMIDE 40 MG/1
40 TABLET ORAL DAILY
Status: DISCONTINUED | OUTPATIENT
Start: 2025-03-22 | End: 2025-03-23 | Stop reason: HOSPADM

## 2025-03-22 RX ORDER — ALPRAZOLAM 0.5 MG
0.5 TABLET ORAL
Status: DISCONTINUED | OUTPATIENT
Start: 2025-03-22 | End: 2025-03-22

## 2025-03-22 RX ORDER — LIDOCAINE 4 G/G
1 PATCH TOPICAL DAILY
Status: DISCONTINUED | OUTPATIENT
Start: 2025-03-22 | End: 2025-03-23 | Stop reason: HOSPADM

## 2025-03-22 RX ORDER — IPRATROPIUM BROMIDE AND ALBUTEROL SULFATE 2.5; .5 MG/3ML; MG/3ML
1 SOLUTION RESPIRATORY (INHALATION)
Status: DISCONTINUED | OUTPATIENT
Start: 2025-03-22 | End: 2025-03-23 | Stop reason: HOSPADM

## 2025-03-22 RX ORDER — ALPRAZOLAM 0.25 MG
0.25 TABLET ORAL
Status: DISCONTINUED | OUTPATIENT
Start: 2025-03-23 | End: 2025-03-22

## 2025-03-22 RX ORDER — HYDROCODONE BITARTRATE AND ACETAMINOPHEN 5; 325 MG/1; MG/1
1 TABLET ORAL ONCE
Status: COMPLETED | OUTPATIENT
Start: 2025-03-22 | End: 2025-03-22

## 2025-03-22 RX ADMIN — FLUOXETINE HYDROCHLORIDE 20 MG: 20 CAPSULE ORAL at 09:00

## 2025-03-22 RX ADMIN — ACETAMINOPHEN 650 MG: 325 TABLET ORAL at 03:33

## 2025-03-22 RX ADMIN — FERROUS SULFATE TAB EC 325 MG (65 MG FE EQUIVALENT) 325 MG: 325 (65 FE) TABLET DELAYED RESPONSE at 09:00

## 2025-03-22 RX ADMIN — SODIUM CHLORIDE, PRESERVATIVE FREE 10 ML: 5 INJECTION INTRAVENOUS at 20:44

## 2025-03-22 RX ADMIN — CARVEDILOL 3.12 MG: 6.25 TABLET, FILM COATED ORAL at 09:00

## 2025-03-22 RX ADMIN — FLUTICASONE PROPIONATE 2 SPRAY: 50 SPRAY, METERED NASAL at 09:01

## 2025-03-22 RX ADMIN — HYDRALAZINE HYDROCHLORIDE 10 MG: 20 INJECTION INTRAMUSCULAR; INTRAVENOUS at 03:42

## 2025-03-22 RX ADMIN — SALINE NASAL SPRAY 1 SPRAY: 1.5 SOLUTION NASAL at 16:26

## 2025-03-22 RX ADMIN — HYDROCODONE BITARTRATE AND ACETAMINOPHEN 1 TABLET: 5; 325 TABLET ORAL at 22:36

## 2025-03-22 RX ADMIN — WATER 1000 MG: 1 INJECTION INTRAMUSCULAR; INTRAVENOUS; SUBCUTANEOUS at 12:36

## 2025-03-22 RX ADMIN — IPRATROPIUM BROMIDE AND ALBUTEROL SULFATE 1 DOSE: .5; 2.5 SOLUTION RESPIRATORY (INHALATION) at 20:09

## 2025-03-22 RX ADMIN — AZITHROMYCIN MONOHYDRATE 250 MG: 500 INJECTION, POWDER, LYOPHILIZED, FOR SOLUTION INTRAVENOUS at 13:02

## 2025-03-22 RX ADMIN — CETIRIZINE HYDROCHLORIDE 10 MG: 10 TABLET, FILM COATED ORAL at 09:00

## 2025-03-22 RX ADMIN — SODIUM CHLORIDE, PRESERVATIVE FREE 10 ML: 5 INJECTION INTRAVENOUS at 09:00

## 2025-03-22 RX ADMIN — GUAIFENESIN 600 MG: 600 TABLET, EXTENDED RELEASE ORAL at 09:00

## 2025-03-22 RX ADMIN — ALPRAZOLAM 0.25 MG: 0.25 TABLET ORAL at 12:36

## 2025-03-22 RX ADMIN — ALPRAZOLAM 0.5 MG: 0.5 TABLET ORAL at 19:53

## 2025-03-22 RX ADMIN — FUROSEMIDE 40 MG: 40 TABLET ORAL at 12:37

## 2025-03-22 RX ADMIN — HEPARIN SODIUM 5000 UNITS: 5000 INJECTION INTRAVENOUS; SUBCUTANEOUS at 04:51

## 2025-03-22 RX ADMIN — ACETAMINOPHEN 650 MG: 325 TABLET ORAL at 19:53

## 2025-03-22 RX ADMIN — HEPARIN SODIUM 5000 UNITS: 5000 INJECTION INTRAVENOUS; SUBCUTANEOUS at 20:39

## 2025-03-22 RX ADMIN — GUAIFENESIN 600 MG: 600 TABLET, EXTENDED RELEASE ORAL at 19:53

## 2025-03-22 RX ADMIN — TIZANIDINE 2 MG: 4 TABLET ORAL at 08:59

## 2025-03-22 RX ADMIN — HEPARIN SODIUM 5000 UNITS: 5000 INJECTION INTRAVENOUS; SUBCUTANEOUS at 12:38

## 2025-03-22 RX ADMIN — SALINE NASAL SPRAY 1 SPRAY: 1.5 SOLUTION NASAL at 03:28

## 2025-03-22 RX ADMIN — PREDNISONE 40 MG: 20 TABLET ORAL at 09:00

## 2025-03-22 RX ADMIN — LEVOTHYROXINE SODIUM 50 MCG: 0.05 TABLET ORAL at 09:00

## 2025-03-22 RX ADMIN — SALINE NASAL SPRAY 1 SPRAY: 1.5 SOLUTION NASAL at 20:44

## 2025-03-22 RX ADMIN — SALINE NASAL SPRAY 1 SPRAY: 1.5 SOLUTION NASAL at 09:01

## 2025-03-22 RX ADMIN — DONEPEZIL HYDROCHLORIDE 10 MG: 10 TABLET, FILM COATED ORAL at 19:53

## 2025-03-22 ASSESSMENT — PAIN SCALES - GENERAL
PAINLEVEL_OUTOF10: 3
PAINLEVEL_OUTOF10: 9
PAINLEVEL_OUTOF10: 4

## 2025-03-22 ASSESSMENT — PAIN DESCRIPTION - ORIENTATION
ORIENTATION: LEFT

## 2025-03-22 ASSESSMENT — PAIN DESCRIPTION - LOCATION
LOCATION: ELBOW
LOCATION: BACK;ARM
LOCATION: ELBOW

## 2025-03-22 ASSESSMENT — PAIN DESCRIPTION - DESCRIPTORS
DESCRIPTORS: SORE
DESCRIPTORS: SORE

## 2025-03-23 VITALS
DIASTOLIC BLOOD PRESSURE: 66 MMHG | TEMPERATURE: 98.8 F | SYSTOLIC BLOOD PRESSURE: 184 MMHG | HEART RATE: 65 BPM | RESPIRATION RATE: 24 BRPM | OXYGEN SATURATION: 91 %

## 2025-03-23 PROBLEM — J13 PNEUMONIA OF BOTH LOWER LOBES DUE TO STREPTOCOCCUS PNEUMONIAE: Status: ACTIVE | Noted: 2025-03-20

## 2025-03-23 LAB
ANION GAP SERPL CALCULATED.3IONS-SCNC: 11 MMOL/L (ref 9–16)
BASOPHILS # BLD: <0.03 K/UL (ref 0–0.2)
BASOPHILS NFR BLD: 0 % (ref 0–2)
BUN SERPL-MCNC: 25 MG/DL (ref 8–23)
CALCIUM SERPL-MCNC: 8.6 MG/DL (ref 8.6–10.4)
CHLORIDE SERPL-SCNC: 105 MMOL/L (ref 98–107)
CO2 SERPL-SCNC: 22 MMOL/L (ref 20–31)
CREAT SERPL-MCNC: 1.3 MG/DL (ref 0.6–0.9)
EOSINOPHIL # BLD: <0.03 K/UL (ref 0–0.44)
EOSINOPHILS RELATIVE PERCENT: 0 % (ref 1–4)
ERYTHROCYTE [DISTWIDTH] IN BLOOD BY AUTOMATED COUNT: 15.1 % (ref 11.8–14.4)
GFR, ESTIMATED: 38 ML/MIN/1.73M2
GLUCOSE SERPL-MCNC: 94 MG/DL (ref 74–99)
HCT VFR BLD AUTO: 30.1 % (ref 36.3–47.1)
HGB BLD-MCNC: 9.1 G/DL (ref 11.9–15.1)
IMM GRANULOCYTES # BLD AUTO: 0.15 K/UL (ref 0–0.3)
IMM GRANULOCYTES NFR BLD: 1 %
LYMPHOCYTES NFR BLD: 1.9 K/UL (ref 1.1–3.7)
LYMPHOCYTES RELATIVE PERCENT: 16 % (ref 24–43)
MCH RBC QN AUTO: 27.7 PG (ref 25.2–33.5)
MCHC RBC AUTO-ENTMCNC: 30.2 G/DL (ref 28.4–34.8)
MCV RBC AUTO: 91.8 FL (ref 82.6–102.9)
MONOCYTES NFR BLD: 0.5 K/UL (ref 0.1–1.2)
MONOCYTES NFR BLD: 4 % (ref 3–12)
NEUTROPHILS NFR BLD: 79 % (ref 36–65)
NEUTS SEG NFR BLD: 9.56 K/UL (ref 1.5–8.1)
NRBC BLD-RTO: 0 PER 100 WBC
PLATELET # BLD AUTO: 360 K/UL (ref 138–453)
PMV BLD AUTO: 9 FL (ref 8.1–13.5)
POTASSIUM SERPL-SCNC: 3.8 MMOL/L (ref 3.7–5.3)
RBC # BLD AUTO: 3.28 M/UL (ref 3.95–5.11)
RBC # BLD: ABNORMAL 10*6/UL
SODIUM SERPL-SCNC: 138 MMOL/L (ref 136–145)
WBC OTHER # BLD: 12.1 K/UL (ref 3.5–11.3)

## 2025-03-23 PROCEDURE — 6360000002 HC RX W HCPCS: Performed by: NURSE PRACTITIONER

## 2025-03-23 PROCEDURE — 99239 HOSP IP/OBS DSCHRG MGMT >30: CPT | Performed by: INTERNAL MEDICINE

## 2025-03-23 PROCEDURE — 80048 BASIC METABOLIC PNL TOTAL CA: CPT

## 2025-03-23 PROCEDURE — 85025 COMPLETE CBC W/AUTO DIFF WBC: CPT

## 2025-03-23 PROCEDURE — 2500000003 HC RX 250 WO HCPCS: Performed by: FAMILY MEDICINE

## 2025-03-23 PROCEDURE — 6360000002 HC RX W HCPCS: Performed by: FAMILY MEDICINE

## 2025-03-23 PROCEDURE — 36415 COLL VENOUS BLD VENIPUNCTURE: CPT

## 2025-03-23 PROCEDURE — 6370000000 HC RX 637 (ALT 250 FOR IP): Performed by: INTERNAL MEDICINE

## 2025-03-23 PROCEDURE — 94640 AIRWAY INHALATION TREATMENT: CPT

## 2025-03-23 PROCEDURE — 94761 N-INVAS EAR/PLS OXIMETRY MLT: CPT

## 2025-03-23 PROCEDURE — 2500000003 HC RX 250 WO HCPCS: Performed by: NURSE PRACTITIONER

## 2025-03-23 RX ADMIN — IPRATROPIUM BROMIDE AND ALBUTEROL SULFATE 1 DOSE: .5; 2.5 SOLUTION RESPIRATORY (INHALATION) at 08:23

## 2025-03-23 RX ADMIN — HEPARIN SODIUM 5000 UNITS: 5000 INJECTION INTRAVENOUS; SUBCUTANEOUS at 13:52

## 2025-03-23 RX ADMIN — SALINE NASAL SPRAY 1 SPRAY: 1.5 SOLUTION NASAL at 07:50

## 2025-03-23 RX ADMIN — WATER 1000 MG: 1 INJECTION INTRAMUSCULAR; INTRAVENOUS; SUBCUTANEOUS at 12:24

## 2025-03-23 RX ADMIN — PREDNISONE 40 MG: 20 TABLET ORAL at 07:50

## 2025-03-23 RX ADMIN — FLUOXETINE HYDROCHLORIDE 20 MG: 20 CAPSULE ORAL at 07:50

## 2025-03-23 RX ADMIN — FLUTICASONE PROPIONATE 2 SPRAY: 50 SPRAY, METERED NASAL at 07:50

## 2025-03-23 RX ADMIN — FERROUS SULFATE TAB EC 325 MG (65 MG FE EQUIVALENT) 325 MG: 325 (65 FE) TABLET DELAYED RESPONSE at 07:49

## 2025-03-23 RX ADMIN — SODIUM CHLORIDE, PRESERVATIVE FREE 10 ML: 5 INJECTION INTRAVENOUS at 08:07

## 2025-03-23 RX ADMIN — ALPRAZOLAM 0.25 MG: 0.25 TABLET ORAL at 07:50

## 2025-03-23 RX ADMIN — GUAIFENESIN 600 MG: 600 TABLET, EXTENDED RELEASE ORAL at 07:50

## 2025-03-23 RX ADMIN — CETIRIZINE HYDROCHLORIDE 10 MG: 10 TABLET, FILM COATED ORAL at 07:49

## 2025-03-23 RX ADMIN — HEPARIN SODIUM 5000 UNITS: 5000 INJECTION INTRAVENOUS; SUBCUTANEOUS at 05:28

## 2025-03-23 RX ADMIN — LEVOTHYROXINE SODIUM 50 MCG: 0.05 TABLET ORAL at 07:50

## 2025-03-23 RX ADMIN — FUROSEMIDE 40 MG: 40 TABLET ORAL at 07:50

## 2025-03-23 NOTE — DISCHARGE SUMMARY
Attempted to call report at 1430. No answer. Patient discharged to [facility]. Tommy transporting patient to [facility].     Patient wheeled to front entrance by staff and assisted into [facility] vehicle. Tommy to give [place] discharge packet paperwork.    
symptoms  -continue taking medications as prescribed    Diet: regular diet    Activity: As tolerated    Instructions to Patient:     Discharge Medications:      Medication List        START taking these medications      cefUROXime 250 MG tablet  Commonly known as: CEFTIN  Take 1 tablet by mouth 2 times daily for 7 days     guaiFENesin 600 MG extended release tablet  Commonly known as: MUCINEX  Take 1 tablet by mouth 2 times daily for 7 days            CONTINUE taking these medications      ALPRAZolam 0.25 MG tablet  Commonly known as: XANAX     donepezil 10 MG tablet  Commonly known as: ARICEPT     ferrous sulfate 325 (65 Fe) MG EC tablet  Commonly known as: FE TABS 325  Take 1 tablet by mouth daily (with breakfast)     fexofenadine 180 MG tablet  Commonly known as: ALLEGRA     furosemide 20 MG tablet  Commonly known as: LASIX  Take 1 tablet by mouth daily     levothyroxine 50 MCG tablet  Commonly known as: SYNTHROID  Take 1 tablet by mouth daily     lithium 300 MG extended release tablet  Commonly known as: LITHOBID     Symbyax 6-25 MG Caps capsule  Generic drug: OLANZapine-FLUoxetine     VESIcare 5 MG tablet  Generic drug: solifenacin            STOP taking these medications      cyanocobalamin 1000 MCG tablet  Commonly known as: CVS VITAMIN B12     FLUoxetine 20 MG capsule  Commonly known as: PROZAC               Where to Get Your Medications        Information about where to get these medications is not yet available    Ask your nurse or doctor about these medications  cefUROXime 250 MG tablet  guaiFENesin 600 MG extended release tablet         No discharge procedures on file.    Time Spent on discharge is  35 mins in patient examination, evaluation, counseling as well as medication reconciliation, prescriptions for required medications, discharge plan and follow up.    Electronically signed by   Bogdan Parikh DO  3/23/2025  11:22 AM      Thank you Kannan Griffith MD for the opportunity to be

## 2025-03-23 NOTE — PLAN OF CARE
Problem: Discharge Planning  Goal: Discharge to home or other facility with appropriate resources  3/21/2025 0341 by Carmel Spear RN  Outcome: Progressing  Flowsheets (Taken 3/20/2025 1925)  Discharge to home or other facility with appropriate resources: Identify barriers to discharge with patient and caregiver     Problem: ABCDS Injury Assessment  Goal: Absence of physical injury  3/21/2025 0341 by Carmel Spear RN  Outcome: Progressing     Problem: Safety - Adult  Goal: Free from fall injury  3/21/2025 0341 by Carmel Spear RN  Outcome: Progressing     Problem: Chronic Conditions and Co-morbidities  Goal: Patient's chronic conditions and co-morbidity symptoms are monitored and maintained or improved  3/21/2025 0341 by Carmel Spear RN  Outcome: Progressing  Flowsheets (Taken 3/20/2025 1925)  Care Plan - Patient's Chronic Conditions and Co-Morbidity Symptoms are Monitored and Maintained or Improved: Monitor and assess patient's chronic conditions and comorbid symptoms for stability, deterioration, or improvement     
  Problem: Discharge Planning  Goal: Discharge to home or other facility with appropriate resources  3/21/2025 0341 by Carmel Spear RN  Outcome: Progressing  Flowsheets (Taken 3/20/2025 1925)  Discharge to home or other facility with appropriate resources: Identify barriers to discharge with patient and caregiver     Problem: ABCDS Injury Assessment  Goal: Absence of physical injury  3/21/2025 1225 by Rachel Sue RN  Outcome: Progressing  3/21/2025 0341 by Carmel Spear RN  Outcome: Progressing     Problem: Safety - Adult  Goal: Free from fall injury  3/21/2025 1225 by Rachel Sue RN  Outcome: Progressing  3/21/2025 0341 by Carmel Spear RN  Outcome: Progressing     Problem: Chronic Conditions and Co-morbidities  Goal: Patient's chronic conditions and co-morbidity symptoms are monitored and maintained or improved  3/21/2025 0341 by Carmel Spear RN  Outcome: Progressing  Flowsheets (Taken 3/20/2025 1925)  Care Plan - Patient's Chronic Conditions and Co-Morbidity Symptoms are Monitored and Maintained or Improved: Monitor and assess patient's chronic conditions and comorbid symptoms for stability, deterioration, or improvement     Problem: Pain  Goal: Verbalizes/displays adequate comfort level or baseline comfort level  Outcome: Progressing     Problem: Skin/Tissue Integrity  Goal: Skin integrity remains intact  Description: 1.  Monitor for areas of redness and/or skin breakdown  2.  Assess vascular access sites hourly  3.  Every 4-6 hours minimum:  Change oxygen saturation probe site  4.  Every 4-6 hours:  If on nasal continuous positive airway pressure, respiratory therapy assess nares and determine need for appliance change or resting period  Outcome: Progressing     
  Problem: Discharge Planning  Goal: Discharge to home or other facility with appropriate resources  3/22/2025 1258 by Sheridan Montana RN  Outcome: Progressing  3/22/2025 0223 by Carmel Spear RN  Outcome: Progressing  Flowsheets (Taken 3/21/2025 1944)  Discharge to home or other facility with appropriate resources: Identify barriers to discharge with patient and caregiver     Problem: ABCDS Injury Assessment  Goal: Absence of physical injury  3/22/2025 1258 by Sheridan Montana RN  Outcome: Progressing  3/22/2025 0223 by Carmel Spear RN  Outcome: Progressing     Problem: Safety - Adult  Goal: Free from fall injury  3/22/2025 1258 by Sheridan Montana RN  Outcome: Progressing  3/22/2025 0223 by Carmel Spear RN  Outcome: Progressing     Problem: Chronic Conditions and Co-morbidities  Goal: Patient's chronic conditions and co-morbidity symptoms are monitored and maintained or improved  3/22/2025 1258 by Sheridan Montana RN  Outcome: Progressing  3/22/2025 0223 by Carmel Spear RN  Outcome: Progressing  Flowsheets (Taken 3/21/2025 1944)  Care Plan - Patient's Chronic Conditions and Co-Morbidity Symptoms are Monitored and Maintained or Improved: Monitor and assess patient's chronic conditions and comorbid symptoms for stability, deterioration, or improvement     Problem: Pain  Goal: Verbalizes/displays adequate comfort level or baseline comfort level  3/22/2025 1258 by Sheridan Montana RN  Outcome: Progressing  3/22/2025 0223 by Carmel Spear RN  Outcome: Progressing     Problem: Skin/Tissue Integrity  Goal: Skin integrity remains intact  Description: 1.  Monitor for areas of redness and/or skin breakdown  2.  Assess vascular access sites hourly  3.  Every 4-6 hours minimum:  Change oxygen saturation probe site  4.  Every 4-6 hours:  If on nasal continuous positive airway pressure, respiratory therapy assess nares and determine need for appliance change or resting period  3/22/2025 1258 by Nan 
  Problem: Discharge Planning  Goal: Discharge to home or other facility with appropriate resources  Outcome: Progressing     Problem: ABCDS Injury Assessment  Goal: Absence of physical injury  Outcome: Progressing     Problem: Safety - Adult  Goal: Free from fall injury  Outcome: Progressing     
  Problem: Discharge Planning  Goal: Discharge to home or other facility with appropriate resources  Outcome: Progressing  Flowsheets (Taken 3/21/2025 1944)  Discharge to home or other facility with appropriate resources: Identify barriers to discharge with patient and caregiver     Problem: ABCDS Injury Assessment  Goal: Absence of physical injury  3/22/2025 0223 by Carmel Spear RN  Outcome: Progressing     Problem: Safety - Adult  Goal: Free from fall injury  3/22/2025 0223 by Carmel Spear RN  Outcome: Progressing     Problem: Chronic Conditions and Co-morbidities  Goal: Patient's chronic conditions and co-morbidity symptoms are monitored and maintained or improved  Outcome: Progressing  Flowsheets (Taken 3/21/2025 1944)  Care Plan - Patient's Chronic Conditions and Co-Morbidity Symptoms are Monitored and Maintained or Improved: Monitor and assess patient's chronic conditions and comorbid symptoms for stability, deterioration, or improvement     Problem: Pain  Goal: Verbalizes/displays adequate comfort level or baseline comfort level  3/22/2025 0223 by Careml Spear RN  Outcome: Progressing     Problem: Skin/Tissue Integrity  Goal: Skin integrity remains intact  Description: 1.  Monitor for areas of redness and/or skin breakdown  2.  Assess vascular access sites hourly  3.  Every 4-6 hours minimum:  Change oxygen saturation probe site  4.  Every 4-6 hours:  If on nasal continuous positive airway pressure, respiratory therapy assess nares and determine need for appliance change or resting period  3/22/2025 0223 by Carmel Spear RN  Outcome: Progressing  Flowsheets (Taken 3/21/2025 1944)  Skin Integrity Remains Intact: Monitor for areas of redness and/or skin breakdown     
  Problem: Discharge Planning  Goal: Discharge to home or other facility with appropriate resources  Outcome: Progressing  Flowsheets (Taken 3/22/2025 2000)  Discharge to home or other facility with appropriate resources: Identify barriers to discharge with patient and caregiver     Problem: ABCDS Injury Assessment  Goal: Absence of physical injury  Outcome: Progressing     Problem: Safety - Adult  Goal: Free from fall injury  Outcome: Progressing     Problem: Chronic Conditions and Co-morbidities  Goal: Patient's chronic conditions and co-morbidity symptoms are monitored and maintained or improved  Outcome: Progressing  Flowsheets (Taken 3/22/2025 2000)  Care Plan - Patient's Chronic Conditions and Co-Morbidity Symptoms are Monitored and Maintained or Improved: Monitor and assess patient's chronic conditions and comorbid symptoms for stability, deterioration, or improvement     Problem: Pain  Goal: Verbalizes/displays adequate comfort level or baseline comfort level  Outcome: Progressing     Problem: Skin/Tissue Integrity  Goal: Skin integrity remains intact  Description: 1.  Monitor for areas of redness and/or skin breakdown  2.  Assess vascular access sites hourly  3.  Every 4-6 hours minimum:  Change oxygen saturation probe site  4.  Every 4-6 hours:  If on nasal continuous positive airway pressure, respiratory therapy assess nares and determine need for appliance change or resting period  Outcome: Progressing  Flowsheets (Taken 3/22/2025 2000)  Skin Integrity Remains Intact: Monitor for areas of redness and/or skin breakdown     
  Problem: Respiratory - Adult  Goal: Achieves optimal ventilation and oxygenation  Outcome: Progressing     
[facility]  Office: [phone]  Jose A Paul DO, Brian Crump, DO, Samir Omer DO, Sergio Crystal, DO, Camilo Rahman MD, Bianca Orozco MD, Esa Hughes MD, Meredith Dunlap MD,  David Ricketts MD, Arben Gonzalez MD, Dorina Chacko MD,  Bogdan Parikh DO, Isaiah Hall MD, Jonah Hanson MD, Yogesh Paul DO, Maria Teresa Gupta MD,  Theo Mccall DO, Savannah Crowell MD, Luly Espinoza MD, Suyapa Villanueva MD, Blanca Hess MD,  Madi Pak MD, Fred Delarosa MD, Jovanny Lafleur MD, Spenser Avelar MD, Quintin Aranda MD, Jacky Simon MD, Willy Davis DO, Kraig Bose MD, Bogdan Shaw MD, Mohsin Reza, MD, Shirley Waterhouse, CNP,  Lenore Armenta CNP, Willy Piña, CNP,  Zoë Bhat, DNP, Eileen Temple, CNP, Gabriela Montiel, CNP, Janelle Hobbs, CNP, Bibi Allen, CNP, PATRICIA Skelton-SHANTAL, Marcie Stone, CNP, Sae Wen, CNP,  Mica Infante, CNP, Caprice Pruitt, CNP, Alanna Garcia, CNP,  Alina Wayne, CNS, Vanessa Tavarez CNP, Rubi Gtz, CNP,   Demi Lerma, CNP         [facility]   IN-PATIENT SERVICE   [facility]    Second Visit Note  For more detailed information please refer to the progress note of the day      3/20/2025    12:41 PM    Name:   Anahi Mccarty  MRN:     [id number]     Acct:      [id number]   Room:   OBS 10/10-1  IP Day:  0  Admit Date:  3/19/2025  8:05 PM    PCP:   Kannan Silverio MD  Code Status:  DNR-CCA    Patient was brought to emergency room by daughter after a fall she took at home.  She had been feeling ill for last several days and was having coughing.  Patient has underlying history of Alzheimer's dementia.  Patient is Bulgarian-speaking.  She has been having pain in left side rib.  She was afebrile on presentation.  Blood pressure was elevated.  Patient was noted to have leukocytosis 13.6 with low hemoglobin 9.2.  Patient tested negative for SARS-CoV-2, influenza A, influenza B.  CT chest was obtained 
101.8

## 2025-03-23 NOTE — CONSULTS
CONSULTING SERVICE: Otolaryngology-Head and Neck Surgery    Informant:   The history was obtained from chart review and bedside nurse.    Unable to use     Chief Complaint:   Her chief complaint is mastoiditis    History of Present Illness:   Anahi Mccarty is a 89 y.o. female seen consultation at the request of primary team on 3/20/2025.      Patient from mental care facility and sent to ER after a fall. Had CT done which showed left sided mastoiditis. Patient primarily Italian speaking and  not working at bedside.     Nurse states patient has not been having any pain on left side of face, can open mouth without issue.     Other Pertinent ENT-specific HPI:  None    Pertinent Social/Birth/Family/Medical/Surgical History   None    Examination:   Vital Signs   Vitals:    03/20/25 0234 03/20/25 0236 03/20/25 0429 03/20/25 0717   BP: (!) 159/96 (!) 159/96 (!) 159/96 (!) 161/49   Pulse:   66    Resp:   24    Temp:   97.7 °F (36.5 °C) 97.9 °F (36.6 °C)   TempSrc:   Oral Oral   SpO2:   96%        Constitutional   General Appearance: well developed and well nourished and in no acute distress  Speech: age appropriate    Head & Face   Head:   normocephalic and symmetric  Eyes: no eyelid swelling, no conjunctival injection or exudate, pupils equal round and reactive to light    Ears   Right EXT: normal  Right EAC: patent  Right TM: normal landmarks and mobility    Left EXT: normal  Left EAC: patent  Left TM: normal landmarks and mobility    Hearing: is responsive to whispered voice.       Nose   Dorsum: dorsum midline  Nasal mucosa: no edema.    Rhinorrhea: no drainage  Septum: midline. No perforation  Turbinates: no inferior turbinate hypertrophy  Nasopharynx Unable to perform indirect mirror laryngoscopy due to patient age and intolerance of exam    Oral Cavity, Oropharynx   Lips: normal  Dentition: dentition grossly normal   Oral mucosa: moist, pink  Gums: normal  Palate: intact, mobile  Pharynx: 
Session ID: [id number]  Language: Kyrgyz   ID: #[id number]   Name: Sonal
Session ID: [id number]  Language: Slovenian   ID: #[id number]   Name: Tere
Session ID: [id number]  Language: Uzbek   ID: #[id number]   Name: Radhames
Session ID: [id number]  Language: Pashto   ID: #[id number]   Name: Ubaldo
Session ID: [id number]  Language: Malay   ID: #[id number]   Name: Brittney
Session ID: [id number]  Language: Lao   ID: #[id number]   Name: Penny
Session ID: [id number]  Language: Mongolian   ID: #[id number]   Name: Herminia
for Exam: S/p fall; ORDERING SYSTEM PROVIDED HISTORY: S/p fall, left rib fracture, left flank pain TECHNOLOGIST PROVIDED HISTORY: S/p fall, left rib fracture, left flank pain Decision Support Exception - unselect if not a suspected or confirmed emergency medical condition->Emergency Medical Condition (MA) Reason for Exam: S/p fall, left rib fracture, left flank pain FINDINGS: HEAD BRAIN/VENTRICLES: There is no acute intracranial hemorrhage, mass effect or midline shift.  No abnormal extra-axial fluid collection.  No  evidence of an acute infarct.  Similar periventricular white matter hypodensities most commonly relate to chronic microvascular ischemic disease.  There is no evidence of acute hydrocephalus. ORBITS: The visualized portion of the orbits demonstrate no acute abnormality. SINUSES: New left maxillary and ethmoid sinus mucoperiosteal thickening with air-fluid level in the left maxillary sinus.  New moderate left mastoid effusion. SOFT TISSUES/SKULL:  No acute abnormality of the visualized skull or soft tissues. CERVICAL SPINE BONES/ALIGNMENT: There is no acute fracture or traumatic malalignment. DEGENERATIVE CHANGES: Multilevel degenerative changes of the cervical spine are most pronounced at C4-C5 where there is severe left and mild right neural foraminal stenosis.  No severe osseous spinal canal stenosis of the cervical spine. SOFT TISSUES: There is no prevertebral soft tissue swelling. THORACIC SPINE BONES/ALIGNMENT: Likely no acute osseous abnormality.  Anterior wedging and moderate vertebral body height loss of the T12 vertebral body appears chronic.  There is subtle buckling of the posterior cortex resulting in minimal spinal canal stenosis at this level. DEGENERATIVE CHANGES: No significant degenerative changes. LUMBAR SPINE BONES/ALIGNMENT: There is no acute fracture or traumatic malalignment. DEGENERATIVE CHANGES: Multilevel degenerative changes of the lumbar spine most pronounced at of L3-L4 where

## 2025-03-23 NOTE — CARE COORDINATION
Case Management   Daily Progress Note       Patient Name: Anahi Mccarty                   YOB: 1930  Diagnosis: Chest wall pain [R07.89]  Fall, initial encounter [W19.XXXA]  Pneumonia of both lower lobes due to infectious organism [J18.9]  Multifocal pneumonia [J18.9]                       GMLOS: 2.8 days  Length of Stay: 1  days    Anticipated Discharge Date: Two or more days before discharge    Readmission Risk (Low < 19, Mod (19-27), High > 27): Readmission Risk Score: 18.8      Patient Transitional Goal: Nursing Home    Current Transitional Plan    [] Home Independently    [] Home with HC    [x] Skilled Nursing Facility    [] Acute Rehabilitation    [] Long Term Acute Care (LTAC)    [] Other:     Plan for the Stay (Medical Management) :          Workflow Continuation (Additional Notes) :    Spoke with Justine with [facility]. She states that prior to the hospitalization, the patient functioned independently. States she was able to walk to the restroom independently. Informed her the patient can stand and pivot and is a one person assist. States she will reach out to the patients daughter Tommy.    1502 Tiggly messaged Dr Parikh. Informed him the patient functioned independently in the assisted living. Requested a PT.OT evaluation before discharge.    1620 Spoke with Tommy the patients daughter. Discussed the patients mobility prior to the hospitalization and her ability now. Discussed a need for rehab and the PT/OT evaluation needed. States she agrees with a skilled stay for rehab. Requests a referral be sent to [facility] as it is near her home.    1624 Referral sent.    1730 Received a call from Antionette with [facility] - able to accept.    PAVEL HOGAN  March 21, 2025    
Case Management   Daily Progress Note       Patient Name: Anahi Mccarty                   YOB: 1930  Diagnosis: Chest wall pain [R07.89]  Fall, initial encounter [W19.XXXA]  Pneumonia of both lower lobes due to infectious organism [J18.9]  Multifocal pneumonia [J18.9]                       GMLOS: 2.8 days  Length of Stay: 3  days    Anticipated Discharge Date: One day until discharge    Readmission Risk (Low < 19, Mod (19-27), High > 27): Readmission Risk Score: 18.8      Patient Transitional Goal: Skilled nursing facility    Current Transitional Plan    [] Home Independently    [] Home with HC    [x] Skilled Nursing Facility    [] Acute Rehabilitation    [] Long Term Acute Care (LTAC)    [] Other:     Plan for the Stay (Medical Management) :          Workflow Continuation (Additional Notes) :    Plan is SNF. Referrals pending to #1 [facility] and #2 [facility]. Call placed to [facility] and spoke with yoly in admissions. She states she believes they are ok to accept but she has to finish reviewing and will call  back      0915: received call from yloy with [facility] stating they can accept patient and are able to take her today if patient is ready for discharge. Notified Dr Parikh and updated patient's daughter Tommy. Patient's daughter agreeable to plan    1400: notified yoly from [facility] that d/c order in place and patient ready to transfer today. Patient's daughter Tommy to transport. [facility] expecting patient around 4pm today. Message to dr parikh to request rene be signed.     1430: rene faxed to facility    Paige Villagran RN  March 23, 2025         
Case Management Assessment  Initial Evaluation    Date/Time of Evaluation: 3/20/2025 3:49 PM  Assessment Completed by: Irene Du    If patient is discharged prior to next notation, then this note serves as note for discharge by case management.    Patient Name: Anahi Mccarty                   YOB: 1930  Diagnosis: Chest wall pain [R07.89]  Fall, initial encounter [W19.XXXA]  Pneumonia of both lower lobes due to infectious organism [J18.9]  Multifocal pneumonia [J18.9]                   Date / Time: 3/19/2025  8:05 PM    Patient Admission Status: Inpatient   Readmission Risk (Low < 19, Mod (19-27), High > 27): Readmission Risk Score: 17.6    Current PCP: Kannan Silverio MD  PCP verified by CM? Yes (Dr. Silverio)    Chart Reviewed: Yes      History Provided by: Child/Family  Patient Orientation: Alert and Oriented, Self    Patient Cognition: Dementia / Early Alzheimer's    Hospitalization in the last 30 days (Readmission):  No    If yes, Readmission Assessment in  Navigator will be completed.    Advance Directives:      Code Status: DNR-CCA   Patient's Primary Decision Maker is: Legal Next of Kin    Primary Decision Maker: Tommy Giordano  Child - [phone]    Secondary Decision Maker: John Paul Mccarty  Child - [phone]    Discharge Planning:    Patient lives with: Children, Other (Comment) (From [facility]) Type of Home: Assisted living (From [facility])  Primary Care Giver: Family  Patient Support Systems include: Children   Current Financial resources: Medicare  Current community resources: Assisted Living (from [facility])  Current services prior to admission: Durable Medical Equipment            Current DME: Walker            Type of Home Care services:  None    ADLS  Prior functional level: Assistance with the following:, Mobility  Current functional level: Assistance with the following:, Mobility    PT AM-PAC:   /24  OT AM-PAC:   /24    Family can provide assistance 
SBIRT is deferred due to pt's memory issues.          Alcohol Screening and Brief Intervention    Deferred [x]    Completed on: 3/20/2025   PREETI HOLLY  
medicare star ratings. She would like facility with private rooms. [facility]. Referrals sent as requested      Paige Villagran RN  March 22, 2025

## 2025-03-24 ENCOUNTER — CARE COORDINATION (OUTPATIENT)
Dept: CARE COORDINATION | Age: 89
End: 2025-03-24

## 2025-03-24 ENCOUNTER — TELEPHONE (OUTPATIENT)
Age: 89
End: 2025-03-24

## 2025-03-24 NOTE — TELEPHONE ENCOUNTER
Care Transitions Initial Follow Up Call    Outreach made within 2 business days of discharge: Yes    Patient: Anahi Mccarty Patient : 1930   MRN: [id number]  Reason for Admission: URI  Discharge Date: 3/23/25       Spoke with: Tommy, Patient's daughter    Discharge department/facility: 2025    TCM Interactive Patient Contact:  Was patient able to fill all prescriptions: Yes  Was patient instructed to bring all medications to the follow-up visit: Yes  Is patient taking all medications as directed in the discharge summary? Yes  Does patient understand their discharge instructions: Yes  Does patient have questions or concerns that need addressed prior to 7-14 day follow up office visit: No - Tommy said that she will let [facility] call and arrange that      Additional needs identified to be addressed with provider  No needs identified             Scheduled appointment with PCP within 7-14 days    Follow Up  Future Appointments   Date Time Provider Department Center   2025  4:30 PM Kannan Silverio MD [facility]       Kathy JIA Dominguez

## 2025-03-27 ENCOUNTER — HOSPITAL ENCOUNTER (OUTPATIENT)
Age: 89
Setting detail: SPECIMEN
Discharge: HOME | End: 2025-03-27

## 2025-03-27 LAB
ANION GAP SERPL CALCULATED.3IONS-SCNC: 11 MMOL/L (ref 9–16)
BUN SERPL-MCNC: 20 MG/DL (ref 8–23)
CALCIUM SERPL-MCNC: 8.1 MG/DL (ref 8.2–9.6)
CHLORIDE SERPL-SCNC: 104 MMOL/L (ref 98–107)
CO2 SERPL-SCNC: 24 MMOL/L (ref 20–31)
CREAT SERPL-MCNC: 1.4 MG/DL (ref 0.5–0.9)
ERYTHROCYTE [DISTWIDTH] IN BLOOD BY AUTOMATED COUNT: 15.3 % (ref 11.8–14.4)
GFR, ESTIMATED: 35 ML/MIN/1.73M2
GLUCOSE SERPL-MCNC: 123 MG/DL (ref 75–121)
HCT VFR BLD AUTO: 30.7 % (ref 36.3–47.1)
HGB BLD-MCNC: 9.8 G/DL (ref 11.9–15.1)
MCH RBC QN AUTO: 29.4 PG (ref 25.2–33.5)
MCHC RBC AUTO-ENTMCNC: 31.9 G/DL (ref 28.4–34.8)
MCV RBC AUTO: 92.2 FL (ref 82.6–102.9)
NRBC BLD-RTO: 0 PER 100 WBC
PLATELET # BLD AUTO: 302 K/UL (ref 138–453)
PMV BLD AUTO: 9.2 FL (ref 8.1–13.5)
POTASSIUM SERPL-SCNC: 4.4 MMOL/L (ref 3.7–5.3)
RBC # BLD AUTO: 3.33 M/UL (ref 3.95–5.11)
SODIUM SERPL-SCNC: 139 MMOL/L (ref 136–145)
WBC OTHER # BLD: 10 K/UL (ref 3.5–11.3)

## 2025-03-27 PROCEDURE — 80048 BASIC METABOLIC PNL TOTAL CA: CPT

## 2025-03-27 PROCEDURE — 36415 COLL VENOUS BLD VENIPUNCTURE: CPT

## 2025-03-27 PROCEDURE — 85027 COMPLETE CBC AUTOMATED: CPT

## 2025-03-28 ENCOUNTER — CARE COORDINATION (OUTPATIENT)
Dept: CARE COORDINATION | Age: 89
End: 2025-03-28

## 2025-03-28 NOTE — CARE COORDINATION
Care Transitions Post-Acute Facility Update Call    3/28/2025    Patient: Anahi Mccarty Patient : 1930   MRN: [id number]  Reason for Admission:    Discharge Date: 3/23/25 RARS: Readmission Risk Score: 18.8    Care Transitions Post Acute Facility Update    Care Transitions Interventions      Post Acute Facility Update   Post Acute Facility: [facility]    ELOS: 21 days      Nursing   Skilled Medication therapies: PT OT RT ST nursing   Reported Nursing Updates: VS /58 P 60 R 18 .6LB     Presented to the hospital for evaluation of recurrent falls in addition to upper respiratory symptoms and cough.  Curent Level Of Function SBA-MOD  Prior Level Of Function Supervision with [facility] for ADLs    Symbax, lithium,prozac, xanax  fall interventions  1 assist with FWW for transfers  cefuroxime thru 3/30 PNA  BPs elevated- hydralazine x2           Rehab/Functional   ADLs: Minimal Assistance   Bed Mobility: Stand by Assist - Presence and Cueing   Transfer Assistance: Contact Guard Assist - Hands on patient for balance   Ambulation Assistance: Contact Guard Assist - Hands on patient for balance   How far (in feet) is the patient ambulating?: 75   Rehab Notes: Home Situation lives at [facility]  Caregiver Support staff family  Community Supports Used none  List Any Prior DME none         SW/Discharge Planning   Discharge Planning / Barriers: [facility] 3 weeks  pending DC            Nathalie Vasquez RN Post Acute Care Manager

## 2025-03-29 NOTE — PROGRESS NOTES
Pharmacist Review and Automatic Dose Adjustment of Prophylactic Enoxaparin    Reviewed reason(s) for admission/hospital problem list    The reviewing pharmacist has made an adjustment to the ordered enoxaparin dose or converted to UFH per the approved [facility] protocol and table as identified below.        Anahi Mccarty is a 89 y.o. female.     Recent Labs     03/19/25 2039   CREATININE 1.7*       Estimated Creatinine Clearance: 16 mL/min (A) (based on SCr of 1.7 mg/dL (H)).    Recent Labs     03/19/25 2039   HGB 9.2*   HCT 29.1*        No results for input(s): \"INR\" in the last 72 hours.    Height:   Ht Readings from Last 1 Encounters:   02/11/25 1.499 m (4' 11\")     Weight:  Wt Readings from Last 1 Encounters:   02/11/25 60.8 kg (134 lb)               Plan: Based upon the patient's weight and renal function    Ordered: Enoxaparin 30mg SUBQ Daily    Changed/converted to    New Order: Heparin 5,000 units SUBQ TID      Thank you,  [facility]ang [facility]  3/20/2025, 2:12 AM    
  Physician Progress Note      PATIENT:               AARON WATSON  CSN #:                  [id number]  :                       1930  ADMIT DATE:       3/19/2025 8:05 PM  DISCH DATE:        3/23/2025 3:04 PM  RESPONDING  PROVIDER #:        Bogdan Parikh DO          QUERY TEXT:    Patient admitted with strep pneumonia. Documentation reflects Acute diastolic   CHF in H&P on 3/20 with noted history of chronic Diastolic CHF. per review CXR   on admission showed mild pulm edema BNP 2489>3128. was given 1 dose of iv   Lasix.  If possible, please document in the progress notes and discharge   summary if Acute Diastolic CHF was:    The medical record reflects the following:  Risk Factors: history of diastolic CHF  Clinical Indicators: admitted with strep pneumonia. Documentation reflects   Acute diastolic CHF in H&P on 3/20 with noted history of chronic Diastolic   CHF. per review CXR on admission showed mild pulm edema BNP 2489>3128. was   given 1 dose of iv Lasix  Treatment: cxr, iv Lasix times one dose, BNP monitoring    Thank You Hesham MARI BSN CCDS  Options provided:  -- Acute on chronic Diastolic CHF confirmed after study  -- Acute on chronic Diastolic CHF treated and resolved  -- Acute Diastolic CHF  ruled out after study, chronic confirmed  -- Other - I will add my own diagnosis  -- Disagree - Not applicable / Not valid  -- Disagree - Clinically unable to determine / Unknown  -- Refer to Clinical Documentation Reviewer    PROVIDER RESPONSE TEXT:    Acute on chronic Diastolic CHF treated and resolved.    Query created by: Rajni Ng on 3/28/2025 6:36 AM      Electronically signed by:  Bogdan Parikh DO 3/29/2025 7:36 AM          
  Session Code: 80241  Language: Armenian   ID: #[id number]   Name: Murtaza        
2244 Notified Shirley Waterhouse, NP of pt being bradycardic into the 40's and that pt is asleep. No new orders at this time.  
[facility]  Office: [phone]  Jose A Paul DO, Brian Crump DO, Samir Omer DO, Sergio Crystal DO, Camilo Rahman MD, Bianca Orozco MD, Esa Hughes MD, Meredith Dunlap MD,  David Ricketts MD, Arben Gonzalez MD, Dorina Chacko MD,  Bogdan Parikh DO, Isaiah Hall MD, Jonah Hanson MD, Yogesh Paul DO, Maria Teresa Gupta MD,  Theo Mccall DO, Savannah Crowell MD, Luly Espinoza MD, Suyapa Villanueva MD, Blanca Hess MD,  Madi Pak MD, Fred Delarosa MD, Jovanny Lafleur MD, Spenser Avelar MD, Quintin Aranda MD, Jacky Simon MD, Willy Davis DO, Kraig Bose MD, Bogdan Shaw MD, Mohsin Reza, MD, Shirley Waterhouse, CNP,  Lenore Armenta CNP, Willy Piña, CNP,  Zoë Bhat, CHRIS, Eileen Temple, CNP, Gabriela Montiel, CNP, Janelle Hobbs, CNP, Bibi Allen CNP, Emerald Conner PA-SHANTAL, Marcie Stone, CNP, Sae Wen, CNP,  Mica Infante, CNP, Caprice Pruitt, CNP, Alanna Garcia, CNP,  Alina aWyne, CNS, Vanessa Tavarez CNP, Rubi Gtz CNP,   Demi Lerma, CNP         [facility]   IN-PATIENT SERVICE   [facility]    Progress Note    3/21/2025    10:43 AM    Name:   Anahi Mccarty  MRN:     [id number]     Acct:      [id number]   Room:   0424/0424-01   Day:  1  Admit Date:  3/19/2025  8:05 PM    PCP:   Kannan Silverio MD  Code Status:  DNR-CCA    Subjective:     She feels better today. She tells me that she feels sick and needs help but is unable to tell me what she needs help with. Just tells me that she doesn't want to be here. Denies any pain, and has no trouble breathing. Unable to tell me the year. She thinks its 2002. Cannot tell me location    Brief History:     89-year-old female who presented to the hospital for evaluation of recurrent falls in addition to URI symptoms and cough.  Patient has advanced dementia so getting history was difficult however based off of records patient had increased work of breathing in the ER 
Facility/Department: [facility] OBSERVATION UNIT   CLINICAL BEDSIDE SWALLOW EVALUATION    NAME: Anahi Mccarty  : 1930  MRN: [id number]    ADMISSION DATE: 3/19/2025  ADMITTING DIAGNOSIS: has Constipation; Hypothyroidism; Hypokalemia; Postherpetic neuralgia; Acute kidney injury; Laceration of head; Dementia in other diseases classified elsewhere, moderate, without behavioral disturbance, psychotic disturbance, mood disturbance, and anxiety ([facility]); Essential hypertension; At high risk for falls; Alzheimer's disease with late onset (CODE) ([facility]); Bipolar 1 disorder ([facility]); Anxiety; Chronic combined systolic and diastolic congestive heart failure ([facility]); Disc degeneration, lumbar; Mixed stress and urge urinary incontinence; Localized edema; Frequent falls; Head trauma; Elevated creatine kinase; Anemia; Elevated serum creatinine; Vitamin B12 deficiency; Episodic confusion; CKD stage 3b, GFR 30-44 ml/min ([facility]); and Multifocal pneumonia on their problem list.    Recent Chest Xray/CT of Chest:   3/19/25 IMPRESSION:  1.  Mild pulmonary edema with a small left pleural effusion. Retrocardiac  opacities may represent sequela of pulmonary edema, atelectasis, and or a  superimposed infectious process.  Correlate with clinical history and  laboratory findings.     2.  Subtle lucency involving the lateral aspect of the left 8th rib  consistent with a nondisplaced fracture given the provided history.  Correlate with point tenderness.       Date of Eval: 3/20/2025  Evaluating Therapist: ANA Madden    Current Diet level:  Current Diet : Regular  Current Liquid Diet : Thin    Primary Complaint   Anahi Mccarty is a 89 y.o.  female with history of anxiety/depression, CKD 3, chronic diastolic heart failure, dementia with history of prior lacunar infarct who presents with Rib Pain (Left) and Cough   and is admitted to the hospital for the management of Multifocal pneumonia.     Patient brought from memory [facility] with concern for 
Notified primary service of the following via secure message: Patient c/o left elbow pain, sharp, rated 9/10. There is a small bruise in that area too. Patient stated pain has been going on for a while (used intepreter service). Gave Tylenol. Offered ice but patient refused.    1507pm Notified primary service of the following via secure message: /73 HR 70 on left arm and 212/73 on right arm. See orders.     1533pm Notified primary service of the following via secure message: Patient received prn Hydralazine and then Norvasc. Current /64. Also, patient's daughter just arrived. She stated that the last time patient was here she received several doses of Norvasc and her legs swelled so bad the skin split. Will add to the allergy list.  
[facility]  Office: [phone]  Jose A Paul DO, Brian Crump DO, Samir Omer DO, Sergio Crystal DO, Camilo Rahman MD, Bianca Orozco MD, Esa Hughes MD, Meredith Dunlap MD,  David Ricketts MD, Arben Gonzalez MD, Dorina Chacko MD,  Bogdan Parikh DO, Isaiah Hall MD, Jonah Hanson MD, Yogesh Paul DO, Maria Teresa Gupta MD,  Theo Mccall DO, Savannah Crowell MD, Luly Espinoza MD, Suyapa Villanueva MD, Blanca Hess MD,  Madi Pak MD, Fred Delarosa MD, Jovanny Lafleur MD, Spenser Avelar MD, Quintin Aranda MD, Jacky Simon MD, Willy Davis DO, Kraig Bose MD, Bogdan Shaw MD, Mohsin Reza, MD, Shirley Waterhouse, CNP,  Lenore Armenta CNP, Willy Piña, CNP,  Zoë Bhat, DNP, Eileen Temple, CNP, Gabriela Montiel, CNP, Janelle Hobbs, CNP, Bibi Allen CNP, Emerald Conner PA-C, Marcie Stone, CNP, Sae Wen, CNP,  Mica Infante, CNP, Caprice Pruitt, CNP, Alanna Garcia, CNP,  Alina Wayne, CNS, Vanessa Tavarez CNP, Rubi Gtz CNP,   Demi Lerma, CNP         [facility]   IN-PATIENT SERVICE   [facility]    Progress Note    3/22/2025    1:29 PM    Name:   Anahi Mccarty  MRN:     [id number]     Acct:      [id number]   Room:   0424/0424-01   Day:  2  Admit Date:  3/19/2025  8:05 PM    PCP:   Kannan Silverio MD  Code Status:  DNR-CCA    Subjective:     Patient daughter at bedside today. Patient appears more uncomfortable today. Lung sounds  more course at the bases today with rhonchi. Bradycardic but did get coreg. Currently we are working on arranging placement.  Patient keeps grabbing lidocaine patch, daughter concerned that this could be irritating her    Brief History:     89-year-old female who presented to the hospital for evaluation of recurrent falls in addition to URI symptoms and cough.  Patient has advanced dementia so getting history was difficult however based off of records patient had increased work of 
[facility]  Office: [phone]  Jose A Paul DO, Brian Crmup DO, Samir Omer DO, Sergio Crystal DO, Camilo Rahman MD, Bianca Orozco MD, Esa Hughes MD, Meredith Dunlap MD,  David Ricketts MD, Arben Gonzalez MD, Dorina Chacko MD,  Bogdan Parikh DO, Isaiah Hall MD, Jonah Hanson MD, Yogesh Paul DO, Maria Teresa Gupta MD,  Theo Mccall DO, Savannah Crowell MD, Luly Espinoza MD, Suyapa Villanueva MD, Blanca Hess MD,  Madi Pak MD, Fred Delarosa MD, Jovanny Lafleur MD, Spenser Avelar MD, Quintin Aranda MD, Jacky Simon MD, Willy Davis DO, Kraig Bose MD, Bogdan Shaw MD, Mohsin Reza, MD, Shirley Waterhouse, CNP,  eLnore Armenta CNP, Willy Piña, CNP,  Zoë Bhat, CHRIS, Eileen Temple, CNP, Gabriela Montiel, CNP, Janelle Hobbs, CNP, Bibi Allen CNP, PATRICIA Skelton-SHANTAL, Marcie Stone, CNP, Sae Wen, CNP,  Mica Infante, CNP, Caprice Pruitt, CNP, Alanna Garcia, CNP,  Alina Wayne, CNS, Vanessa Tavarez CNP, Rubi Gtz CNP,   Demi Lerma, CNP         [facility]   IN-PATIENT SERVICE   [facility]    Progress Note    3/23/2025    11:20 AM    Name:   Anahi Mccarty  MRN:     [id number]     Acct:      [id number]   Room:   0424/0424-01   Day:  3  Admit Date:  3/19/2025  8:05 PM    PCP:   Kannan Silverio MD  Code Status:  DNR-CCA    Subjective:     She feels well today. Her breathing is \"so so\". She does not know the year. Does not know she's in the hospital.    Strep urinary antigen positive     Brief History:     89-year-old female who presented to the hospital for evaluation of recurrent falls in addition to URI symptoms and cough.  Patient has advanced dementia so getting history was difficult however based off of records patient had increased work of breathing in the ER with crackles worse on the left.  X-ray showed persistent bibasilar opacities suggestive of pneumonia.  CT head was negative for any acute 
Session ID:[id number]  Language:Slovak   ID: #[id number]   Name:Sherman   
bed to a chair?: A Little  How much help is needed standing up from a chair using your arms?: A Little  How much help is needed walking in hospital room?: A Little  How much help is needed climbing 3-5 steps with a railing?: Total  AM-PAC Inpatient Mobility Raw Score : 17  AM-PAC Inpatient T-Scale Score : 42.13  Mobility Inpatient CMS 0-100% Score: 50.57  Mobility Inpatient CMS G-Code Modifier : CK    Restrictions/Precautions  Restrictions/Precautions  Restrictions/Precautions: Fall Risk  Activity Level: Up as Tolerated, Up with Assist  Required Braces or Orthoses?: No  Position Activity Restriction  Other Position/Activity Restrictions: Writer requested to nursing to ask Dr. Parikh about T12 burst fracture prior to completion of PT eval.  Dr. Parikh reports back he reviewed the Xrays and patient is ok to mobilize. \"ok to mobilize\" was added to his progress report today 2:37pm       Subjective  General  Patient assessed for rehabilitation services?: Yes  Family/Caregiver Present: Yes (Two family members present, niece and great nephew)  Follows Commands: Within Functional Limits  Subjective  Subjective: Denies pain.  Complains of being cold.       Home Setup/Prior Level of Function  Social/Functional History  Lives With: Family (Patient lives part-time with dtr, part-time in [place])  Type of Home: House  Home Layout: One level  Home Access: Stairs to enter with rails  Entrance Stairs - Number of Steps: 1  Entrance Stairs - Rails: Right (Grab bar)  Bathroom Shower/Tub: Walk-in shower, Shower chair with back  Bathroom Toilet: Standard  Bathroom Equipment: Grab bars in shower, Grab bars around toilet  Bathroom Accessibility: Accessible  Home Equipment: Walker - Rolling, Wheelchair - Manual  Receives Help From: Family  Prior Level of Assist for ADLs: Needs assistance (Patient particpates in ADLs, dtr vs long term assist prn)  Prior Level of Assist for Homemaking: Needs assistance (Dtr vs long term satff manage all)  Homemaking 
Skilled Clinical Factors: Anticpate can thread UEs into sleeves, would need assist to pull over head, would need assist to pull down in back  LE Dressing: Maximum assistance  LE Dressing Skilled Clinical Factors: Requires assist to thread LEs into socks, antcipate would need assist to pull over knees, assist to pull over hips  Toileting: Maximum assistance  Toileting Skilled Clinical Factors: Dtr reports pt has difficulty squencing task, such as will si on toilet with removing pants but not breif    Balance  Balance  Sitting: With support;Without support (Sitting at EOB with 0-1 hand support with stand by assist dynamic/static sitting)    Transfers/Mobility  Bed mobility  Supine to Sit: Partial/Moderate assistance  Sit to Supine: Partial/Moderate assistance  Scooting: Minimal assistance  Bed Mobility Comments: HOB elevated, physical assist provided with gentle verbal cues      Transfers  Transfer Comments: Patient began to have anxiety, descision made to trial standing next visit      Functional Mobility Skilled Clinical Factors: not tested due to anxiety, PT to f/u in p.m         Patient Education  Patient Education  Education Given To: Patient;Family  Education Provided: Role of Therapy  Education Method: Verbal  Barriers to Learning: Cognition  Education Outcome: Verbalized understanding    Goals  Short Term Goals  Time Frame for Short Term Goals: Prior to discharge  Short Term Goal 1: Patient to demonstrate dyanmic sitting balance with modified indep  Short Term Goal 2: Patient to demonstarte unsupported sitting/standing Toileting/LB ADLs with min a  Short Term Goal 3: Patient to demonstarte unsupported sitting/standing Grooming/UB ADLs with sba  Short Term Goal 4: Patient to perform ther ex to improve strength 4+/5  Short Term Goal 5: CONTACT OTR FOR UPDATED GOALS    Plan  Occupational Therapy Plan  Times Per Week: 3-5x/wk  Current Treatment Recommendations: Strengthening, Balance training, Functional mobility

## 2025-04-04 ENCOUNTER — CARE COORDINATION (OUTPATIENT)
Dept: CARE COORDINATION | Age: 89
End: 2025-04-04

## 2025-04-04 ENCOUNTER — TELEPHONE (OUTPATIENT)
Age: 89
End: 2025-04-04

## 2025-04-04 NOTE — TELEPHONE ENCOUNTER
Lanesha from [address] called and ask if Dr Silverio will follow for home care. Lanesha also requested the last office note. Lanesha was told that Dr Silverio will follow and sent last office note

## 2025-04-04 NOTE — CARE COORDINATION
Care Transitions Post-Acute Facility Update Call    4/3/2025    Patient: Anahi Mccarty Patient : 1930   MRN: [id number]  Reason for Admission:    Discharge Date: 3/23/25 RARS: Readmission Risk Score: 18.8    Pt discharges from facility at this time with med 1 care home health and nursing. Daughter given Symbyax medication that she brought in. Discharge paperwork reviewed with daughter. No concerns voiced. Pt leaves facility safely.  Resident discharged back to [facility] with [id number]. Information was faxed and received. Patient driven discharge, no NOMNC issued. Encouraged resident to stay until ELOS but family proceeded with discharge process. No DME was needed reported ordered for discharge. Daughter picked her up and took her back to [place].    Care Transitions Post Acute Facility Update    Care Transitions Interventions      Post Acute Facility Update   Post Acute Facility: [facility]    ELOS: 21 days      Nursing   Skilled Medication therapies: PT OT RT ST nursing   Reported Nursing Updates: VS /72 P 78 R 16  LB     Presented to the hospital for evaluation of recurrent falls in addition to upper respiratory symptoms and cough.  Curent Level Of Function SBA-MOD  Prior Level Of Function Supervision with Lorrie for ADLs    Symbax, lithium,prozac, xanax  fall interventions  1 assist with FWW for transfers  cefuroxime thru 3/30 PNA  BPs elevated- hydralazine x2         Rehab/Functional   ADLs: Minimal Assistance   Bed Mobility: Stand by Assist - Presence and Cueing   Transfer Assistance: Contact Guard Assist - Hands on patient for balance   Ambulation Assistance: Contact Guard Assist - Hands on patient for balance   How far (in feet) is the patient ambulating?: 110   Rehab Notes: Home Situation lives at [facility]  Caregiver Support staff family  Community Supports Used none  List Any Prior DME none         SW/Discharge Planning    Discharge Planning / Barriers:           Nathalie

## 2025-04-07 ENCOUNTER — TELEPHONE (OUTPATIENT)
Age: 89
End: 2025-04-07

## 2025-04-07 ENCOUNTER — CARE COORDINATION (OUTPATIENT)
Dept: CARE COORDINATION | Age: 89
End: 2025-04-07

## 2025-04-07 NOTE — TELEPHONE ENCOUNTER
Called the patient and she said sometimes her legs swell and throb so she has a  difficult time walking. The handicap placard would be so she can park in the handicap area at her job which is closer to the door.

## 2025-04-07 NOTE — CARE COORDINATION
primary care provider and/or specialist office for questions related to their healthcare.      Advance Care Planning:   Does patient have an Advance Directive: reviewed and current.    Medication Reconciliation:  Medication reconciliation was not performed during this call, nursing home does meds .     Remote Patient Monitoring:  Offered patient enrollment in the Remote Patient Monitoring (RPM) program for in-home monitoring: Deferred at this time because  ; will discuss at next outreach.    Assessments:  Care Transitions 24 Hour Call    Care Transitions Interventions          Follow Up Appointment:   Patient does not have a follow up appointment scheduled at time of call. Prefers to self-schedule DUNCAN appointment.   Future Appointments         Provider Specialty Dept Phone    5/14/2025 4:30 PM Kannan Silverio MD Internal Medicine [phone]            Patient closed (daughter states they have all they need) from the Care Transitions program on  4/7/2025.    Handoff:   Patient was not referred to the ACM team due to no additional needs identified.       Patient has agreed to contact primary care provider and/or specialist for any further questions, concerns, or needs.    SYLVIA RIVERS RN

## 2025-04-07 NOTE — TELEPHONE ENCOUNTER
Care Transitions Initial Follow Up Call    Outreach made within 2 business days of discharge: Yes    Patient: Anahi Mccarty Patient : 1930   MRN: [id number]  Reason for Admission: Pneumonia  Discharge Date: 3/23/25 from [place] : 2025 From [place]       Spoke with: Tommy Patient's daughter    Discharge department/facility: [place]/[facility]    TCM Interactive Patient Contact:  Was patient able to fill all prescriptions: Yes  Was patient instructed to bring all medications to the follow-up visit: Yes  Is patient taking all medications as directed in the discharge summary? Yes  Does patient understand their discharge instructions: Yes  Does patient have questions or concerns that need addressed prior to 7-14 day follow up office visit: yes - 2025    Additional needs identified to be addressed with provider  No needs identified             Scheduled appointment with PCP within 7-14 days    Follow Up  Future Appointments   Date Time Provider Department Center   2025  9:00 AM Kannan Silverio MD [facility] DEP   2025  4:30 PM Kannan Silverio MD [facility] DEP       [place], [place]

## 2025-04-09 ENCOUNTER — OFFICE VISIT (OUTPATIENT)
Age: 89
End: 2025-04-09

## 2025-04-09 VITALS
SYSTOLIC BLOOD PRESSURE: 120 MMHG | HEIGHT: 59 IN | WEIGHT: 130 LBS | BODY MASS INDEX: 26.21 KG/M2 | OXYGEN SATURATION: 98 % | HEART RATE: 90 BPM | TEMPERATURE: 96.9 F | DIASTOLIC BLOOD PRESSURE: 66 MMHG

## 2025-04-09 DIAGNOSIS — I10 ESSENTIAL HYPERTENSION: ICD-10-CM

## 2025-04-09 DIAGNOSIS — F31.9 BIPOLAR 1 DISORDER (HCC): ICD-10-CM

## 2025-04-09 DIAGNOSIS — E03.4 HYPOTHYROIDISM DUE TO ACQUIRED ATROPHY OF THYROID: ICD-10-CM

## 2025-04-09 DIAGNOSIS — I50.42 CHRONIC COMBINED SYSTOLIC AND DIASTOLIC CONGESTIVE HEART FAILURE (HCC): ICD-10-CM

## 2025-04-09 DIAGNOSIS — F41.9 ANXIETY: ICD-10-CM

## 2025-04-09 DIAGNOSIS — N39.46 MIXED STRESS AND URGE URINARY INCONTINENCE: ICD-10-CM

## 2025-04-09 DIAGNOSIS — D63.8 ANEMIA OF CHRONIC DISEASE: ICD-10-CM

## 2025-04-09 DIAGNOSIS — G30.1 ALZHEIMER'S DISEASE WITH LATE ONSET (CODE): ICD-10-CM

## 2025-04-09 DIAGNOSIS — N18.32 CKD STAGE 3B, GFR 30-44 ML/MIN (HCC): ICD-10-CM

## 2025-04-09 DIAGNOSIS — Z09 HOSPITAL DISCHARGE FOLLOW-UP: Primary | ICD-10-CM

## 2025-04-09 DIAGNOSIS — Z91.81 AT HIGH RISK FOR FALLS: ICD-10-CM

## 2025-04-09 DIAGNOSIS — Z71.89 ACP (ADVANCE CARE PLANNING): ICD-10-CM

## 2025-04-09 DIAGNOSIS — J13 PNEUMONIA OF BOTH LOWER LOBES DUE TO STREPTOCOCCUS PNEUMONIAE: ICD-10-CM

## 2025-04-09 RX ORDER — LEVOFLOXACIN 500 MG/1
500 TABLET, FILM COATED ORAL DAILY
Qty: 7 TABLET | Refills: 0 | Status: SHIPPED | OUTPATIENT
Start: 2025-04-09 | End: 2025-04-16

## 2025-04-09 RX ORDER — ALBUTEROL SULFATE 0.83 MG/ML
2.5 SOLUTION RESPIRATORY (INHALATION) 4 TIMES DAILY
Qty: 120 EACH | Refills: 3 | Status: SHIPPED | OUTPATIENT
Start: 2025-04-09 | End: 2025-04-16

## 2025-04-09 RX ORDER — LEVOTHYROXINE SODIUM 50 UG/1
50 TABLET ORAL DAILY
Qty: 90 TABLET | Refills: 3 | Status: SHIPPED | OUTPATIENT
Start: 2025-04-09

## 2025-04-09 RX ORDER — SOLIFENACIN SUCCINATE 5 MG/1
5 TABLET, FILM COATED ORAL DAILY
Qty: 90 TABLET | Refills: 3 | Status: SHIPPED | OUTPATIENT
Start: 2025-04-09

## 2025-04-09 RX ORDER — FUROSEMIDE 40 MG/1
40 TABLET ORAL EVERY MORNING
Qty: 90 TABLET | Refills: 3 | Status: SHIPPED | OUTPATIENT
Start: 2025-04-09

## 2025-04-09 RX ORDER — FUROSEMIDE 40 MG/1
40 TABLET ORAL DAILY
COMMUNITY

## 2025-04-09 ASSESSMENT — ENCOUNTER SYMPTOMS
VOMITING: 0
BACK PAIN: 0
ABDOMINAL PAIN: 0
EYE REDNESS: 0
CONSTIPATION: 0
DIARRHEA: 0
SINUS PAIN: 0
BLOOD IN STOOL: 0
WHEEZING: 0
EYE PAIN: 0
COUGH: 0
RHINORRHEA: 0
SORE THROAT: 0
SHORTNESS OF BREATH: 0
NAUSEA: 0
SINUS PRESSURE: 0

## 2025-04-09 NOTE — PROGRESS NOTES
[facility]     Date of Visit:  2025  Patient Name: Anahi Mccarty   Patient :  1930     CHIEF COMPLAINT:     Anahi Mccarty is a 89 y.o. female who presents today for an general visit to be evaluated for the following condition(s):  Chief Complaint   Patient presents with    Follow-Up from Hospital       HISTORY OF PRESENT ILLNESS      Admitted to [facility] .  Diagnosis bilateral lower lobe strep pneumonia.  Discharged .  Admitted to [facility] where she was discharged .  DUNCAN .    She presented to [facility] with URI and cough symptoms with recent falls.  X-ray showed persistent bibasilar opacities suggestive of pneumonia.  CT of the head was negative for any acute process.  Some evidence of acute left maxillary sinusitis.  CT spine: Incomplete burst fracture T12.  Multiple degenerative changes lumbar spine.  Severe spinal canal stenosis at L3-4.  Evaluated by neurosurgery.  Asymptomatic and maintain normal activity.  CT lung showed bibasilar groundglass opacities.  Mucous plugging.  Coreg was stopped due to bradycardia.  White count was 12,100.  Hemoglobin 9.1.  Blood sugar 94.  GFR greater than 60.  Discharged on Ceftin 250 mg.  She was evaluated by otolaryngology CNP and speech pathology.  Discharged on small bite diet.    When she went to [facility] however they put her on a puréed diet.  She had poor p.o. intake at that point.  She did receive PT and OT.  She had an anxiety attacks and they refused to give her Xanax.  Sometimes did wake up at 5 in the morning and give her Xanax.    Back at [facility].  Good p.o. intake.  Taking all her of her medicines.  PT and OT continues.  Using rolling walker.    Last chest x-ray : Possible increase in left basilar airspace opacity due in part to passive atelectasis with overlying left pleural effusion.  Superimposed pneumonia or aspiration not excluded.  Unchanged minimal

## 2025-04-18 ENCOUNTER — HOSPITAL ENCOUNTER (OUTPATIENT)
Age: 89
Discharge: HOME | End: 2025-04-20
Payer: MEDICARE

## 2025-04-18 DIAGNOSIS — J13 PNEUMONIA OF BOTH LOWER LOBES DUE TO STREPTOCOCCUS PNEUMONIAE: ICD-10-CM

## 2025-04-18 PROCEDURE — 71046 X-RAY EXAM CHEST 2 VIEWS: CPT

## 2025-04-20 ENCOUNTER — RESULTS FOLLOW-UP (OUTPATIENT)
Age: 89
End: 2025-04-20

## 2025-04-20 DIAGNOSIS — J13 PNEUMONIA OF BOTH LOWER LOBES DUE TO STREPTOCOCCUS PNEUMONIAE: Primary | ICD-10-CM

## 2025-04-21 DIAGNOSIS — J13 PNEUMONIA OF BOTH LOWER LOBES DUE TO STREPTOCOCCUS PNEUMONIAE: ICD-10-CM

## 2025-04-21 RX ORDER — ALBUTEROL SULFATE 0.83 MG/ML
2.5 SOLUTION RESPIRATORY (INHALATION) 4 TIMES DAILY
Qty: 120 EACH | Refills: 3 | Status: SHIPPED | OUTPATIENT
Start: 2025-04-21 | End: 2025-04-28

## 2025-04-24 ENCOUNTER — HOSPITAL ENCOUNTER (OUTPATIENT)
Dept: CT IMAGING | Age: 89
Discharge: HOME | End: 2025-04-26
Payer: MEDICARE

## 2025-04-24 DIAGNOSIS — J13 PNEUMONIA OF BOTH LOWER LOBES DUE TO STREPTOCOCCUS PNEUMONIAE: ICD-10-CM

## 2025-04-24 PROCEDURE — 71250 CT THORAX DX C-: CPT

## 2025-04-27 ENCOUNTER — RESULTS FOLLOW-UP (OUTPATIENT)
Age: 89
End: 2025-04-27

## 2025-05-07 ENCOUNTER — TELEPHONE (OUTPATIENT)
Age: 89
End: 2025-05-07

## 2025-05-14 ENCOUNTER — TELEPHONE (OUTPATIENT)
Age: 89
End: 2025-05-14

## 2025-05-14 ENCOUNTER — OFFICE VISIT (OUTPATIENT)
Age: 89
End: 2025-05-14

## 2025-05-14 VITALS
SYSTOLIC BLOOD PRESSURE: 124 MMHG | DIASTOLIC BLOOD PRESSURE: 80 MMHG | BODY MASS INDEX: 26.41 KG/M2 | TEMPERATURE: 96.8 F | HEART RATE: 70 BPM | HEIGHT: 59 IN | OXYGEN SATURATION: 93 % | WEIGHT: 131 LBS

## 2025-05-14 DIAGNOSIS — I10 ESSENTIAL HYPERTENSION: ICD-10-CM

## 2025-05-14 DIAGNOSIS — F41.9 ANXIETY: ICD-10-CM

## 2025-05-14 DIAGNOSIS — I50.42 CHRONIC COMBINED SYSTOLIC AND DIASTOLIC CONGESTIVE HEART FAILURE (HCC): ICD-10-CM

## 2025-05-14 DIAGNOSIS — N18.32 CKD STAGE 3B, GFR 30-44 ML/MIN (HCC): ICD-10-CM

## 2025-05-14 DIAGNOSIS — Z91.81 AT HIGH RISK FOR FALLS: ICD-10-CM

## 2025-05-14 DIAGNOSIS — Z71.89 ACP (ADVANCE CARE PLANNING): ICD-10-CM

## 2025-05-14 DIAGNOSIS — N39.46 MIXED STRESS AND URGE URINARY INCONTINENCE: ICD-10-CM

## 2025-05-14 DIAGNOSIS — D63.8 ANEMIA OF CHRONIC DISEASE: ICD-10-CM

## 2025-05-14 DIAGNOSIS — Z00.00 MEDICARE ANNUAL WELLNESS VISIT, SUBSEQUENT: Primary | ICD-10-CM

## 2025-05-14 DIAGNOSIS — F31.9 BIPOLAR 1 DISORDER (HCC): ICD-10-CM

## 2025-05-14 DIAGNOSIS — G30.1 ALZHEIMER'S DISEASE WITH LATE ONSET (CODE) (HCC): ICD-10-CM

## 2025-05-14 DIAGNOSIS — E03.4 HYPOTHYROIDISM DUE TO ACQUIRED ATROPHY OF THYROID: ICD-10-CM

## 2025-05-14 ASSESSMENT — ENCOUNTER SYMPTOMS
SHORTNESS OF BREATH: 0
CONSTIPATION: 0
WHEEZING: 0
BACK PAIN: 0
DIARRHEA: 0
NAUSEA: 0
VOMITING: 0
ABDOMINAL PAIN: 0
SORE THROAT: 0
RHINORRHEA: 0
SINUS PAIN: 0
EYE PAIN: 0
SINUS PRESSURE: 0
BLOOD IN STOOL: 0
COUGH: 0
EYE REDNESS: 0

## 2025-05-14 ASSESSMENT — PATIENT HEALTH QUESTIONNAIRE - PHQ9
4. FEELING TIRED OR HAVING LITTLE ENERGY: NOT AT ALL
7. TROUBLE CONCENTRATING ON THINGS, SUCH AS READING THE NEWSPAPER OR WATCHING TELEVISION: NOT AT ALL
6. FEELING BAD ABOUT YOURSELF - OR THAT YOU ARE A FAILURE OR HAVE LET YOURSELF OR YOUR FAMILY DOWN: NOT AT ALL
10. IF YOU CHECKED OFF ANY PROBLEMS, HOW DIFFICULT HAVE THESE PROBLEMS MADE IT FOR YOU TO DO YOUR WORK, TAKE CARE OF THINGS AT HOME, OR GET ALONG WITH OTHER PEOPLE: NOT DIFFICULT AT ALL
1. LITTLE INTEREST OR PLEASURE IN DOING THINGS: SEVERAL DAYS
8. MOVING OR SPEAKING SO SLOWLY THAT OTHER PEOPLE COULD HAVE NOTICED. OR THE OPPOSITE, BEING SO FIGETY OR RESTLESS THAT YOU HAVE BEEN MOVING AROUND A LOT MORE THAN USUAL: NOT AT ALL
SUM OF ALL RESPONSES TO PHQ QUESTIONS 1-9: 5
3. TROUBLE FALLING OR STAYING ASLEEP: SEVERAL DAYS
2. FEELING DOWN, DEPRESSED OR HOPELESS: SEVERAL DAYS
SUM OF ALL RESPONSES TO PHQ QUESTIONS 1-9: 5
SUM OF ALL RESPONSES TO PHQ QUESTIONS 1-9: 5
SUM OF ALL RESPONSES TO PHQ QUESTIONS 1-9: 4
9. THOUGHTS THAT YOU WOULD BE BETTER OFF DEAD, OR OF HURTING YOURSELF: SEVERAL DAYS
5. POOR APPETITE OR OVEREATING: SEVERAL DAYS

## 2025-05-14 ASSESSMENT — COLUMBIA-SUICIDE SEVERITY RATING SCALE - C-SSRS
2. HAVE YOU ACTUALLY HAD ANY THOUGHTS OF KILLING YOURSELF?: NO
6. HAVE YOU EVER DONE ANYTHING, STARTED TO DO ANYTHING, OR PREPARED TO DO ANYTHING TO END YOUR LIFE?: NO
1. WITHIN THE PAST MONTH, HAVE YOU WISHED YOU WERE DEAD OR WISHED YOU COULD GO TO SLEEP AND NOT WAKE UP?: YES

## 2025-05-14 ASSESSMENT — LIFESTYLE VARIABLES
HOW OFTEN DO YOU HAVE A DRINK CONTAINING ALCOHOL: NEVER
HOW MANY STANDARD DRINKS CONTAINING ALCOHOL DO YOU HAVE ON A TYPICAL DAY: PATIENT DOES NOT DRINK

## 2025-05-14 NOTE — PROGRESS NOTES
[facility]     Date of Visit:  2025  Patient Name: Anahi Mccarty   Patient :  [id number]     CHIEF COMPLAINT:     Anahi Mccarty is a 89 y.o. female who presents today for an general visit to be evaluated for the following condition(s):  Chief Complaint   Patient presents with    Medicare AWV    Knee Pain     Right knee pain, after Patient got off the scale here at the office.    Results     CT scan       HISTORY OF PRESENT ILLNESS    Right knee pain. U sing rolling walker. Taking all meds. Very depressed over the last 2 wk. 2nd day of getting out of bed. CXR and CT chest improved. No coughing.  Sits up to eat. No CP or coughing.        REVIEW OF SYSTEMS     Review of Systems   Constitutional:  Negative for chills, fever and unexpected weight change.   HENT:  Negative for congestion, ear pain, hearing loss, rhinorrhea, sinus pressure, sinus pain, sneezing and sore throat.    Eyes:  Negative for pain, redness and visual disturbance.   Respiratory:  Negative for cough, shortness of breath and wheezing.    Cardiovascular:  Negative for chest pain, palpitations and leg swelling.   Gastrointestinal:  Negative for abdominal pain, blood in stool, constipation, diarrhea, nausea and vomiting.   Endocrine: Negative for cold intolerance and heat intolerance.   Genitourinary:  Negative for difficulty urinating, dysuria, frequency, hematuria and urgency.   Musculoskeletal:  Negative for arthralgias, back pain, gait problem, joint swelling, myalgias and neck pain.   Skin:  Negative for rash and wound.   Allergic/Immunologic: Negative for immunocompromised state.   Neurological:  Negative for dizziness, tremors, seizures, syncope, speech difficulty, weakness, light-headedness, numbness and headaches.   Psychiatric/Behavioral:  Negative for confusion, hallucinations and sleep disturbance. The patient is not nervous/anxious.         REVIEWED INFORMATION      Current Outpatient Medications   Medication Sig

## 2025-05-19 RX ORDER — ALBUTEROL SULFATE 90 UG/1
2 INHALANT RESPIRATORY (INHALATION) 4 TIMES DAILY PRN
Qty: 18 G | Refills: 1 | OUTPATIENT
Start: 2025-05-19

## 2025-08-20 ENCOUNTER — OFFICE VISIT (OUTPATIENT)
Age: 89
End: 2025-08-20

## 2025-08-20 VITALS
SYSTOLIC BLOOD PRESSURE: 138 MMHG | OXYGEN SATURATION: 96 % | BODY MASS INDEX: 27.21 KG/M2 | HEART RATE: 70 BPM | WEIGHT: 135 LBS | TEMPERATURE: 96.8 F | DIASTOLIC BLOOD PRESSURE: 82 MMHG | HEIGHT: 59 IN

## 2025-08-20 DIAGNOSIS — Z71.89 ACP (ADVANCE CARE PLANNING): ICD-10-CM

## 2025-08-20 DIAGNOSIS — I10 ESSENTIAL HYPERTENSION: Primary | ICD-10-CM

## 2025-08-20 DIAGNOSIS — E03.9 ACQUIRED HYPOTHYROIDISM: ICD-10-CM

## 2025-08-20 DIAGNOSIS — Z91.81 AT HIGH RISK FOR FALLS: ICD-10-CM

## 2025-08-20 DIAGNOSIS — D63.8 ANEMIA OF CHRONIC DISEASE: ICD-10-CM

## 2025-08-20 DIAGNOSIS — F41.9 ANXIETY: ICD-10-CM

## 2025-08-20 DIAGNOSIS — N39.46 MIXED STRESS AND URGE URINARY INCONTINENCE: ICD-10-CM

## 2025-08-20 DIAGNOSIS — N18.32 CKD STAGE 3B, GFR 30-44 ML/MIN (HCC): ICD-10-CM

## 2025-08-20 DIAGNOSIS — F31.9 BIPOLAR 1 DISORDER (HCC): ICD-10-CM

## 2025-08-20 DIAGNOSIS — G30.1 ALZHEIMER'S DISEASE WITH LATE ONSET (CODE) (HCC): ICD-10-CM

## 2025-08-20 ASSESSMENT — ENCOUNTER SYMPTOMS
SINUS PAIN: 0
BLOOD IN STOOL: 0
VOMITING: 0
EYE PAIN: 0
CONSTIPATION: 0
DIARRHEA: 0
BACK PAIN: 0
ABDOMINAL PAIN: 0
WHEEZING: 0
RHINORRHEA: 0
COUGH: 0
EYE REDNESS: 0
SINUS PRESSURE: 0
SORE THROAT: 0
SHORTNESS OF BREATH: 0
NAUSEA: 0

## 2025-08-25 DIAGNOSIS — E03.4 HYPOTHYROIDISM DUE TO ACQUIRED ATROPHY OF THYROID: ICD-10-CM

## 2025-08-25 DIAGNOSIS — I10 ESSENTIAL HYPERTENSION: ICD-10-CM
